# Patient Record
Sex: MALE | Race: WHITE | NOT HISPANIC OR LATINO | Employment: FULL TIME | URBAN - NONMETROPOLITAN AREA
[De-identification: names, ages, dates, MRNs, and addresses within clinical notes are randomized per-mention and may not be internally consistent; named-entity substitution may affect disease eponyms.]

---

## 2018-12-02 ENCOUNTER — HOSPITAL ENCOUNTER (EMERGENCY)
Facility: HOSPITAL | Age: 38
Discharge: HOME | End: 2018-12-02
Attending: EMERGENCY MEDICINE | Admitting: EMERGENCY MEDICINE

## 2018-12-02 VITALS
RESPIRATION RATE: 17 BRPM | BODY MASS INDEX: 27.5 KG/M2 | HEIGHT: 71 IN | DIASTOLIC BLOOD PRESSURE: 91 MMHG | HEART RATE: 85 BPM | SYSTOLIC BLOOD PRESSURE: 124 MMHG | WEIGHT: 196.4 LBS | TEMPERATURE: 98.1 F | OXYGEN SATURATION: 96 %

## 2018-12-02 DIAGNOSIS — R11.2 NON-INTRACTABLE VOMITING WITH NAUSEA, UNSPECIFIED VOMITING TYPE: Primary | ICD-10-CM

## 2018-12-02 DIAGNOSIS — R19.7 DIARRHEA, UNSPECIFIED TYPE: ICD-10-CM

## 2018-12-02 LAB
ALBUMIN SERPL-MCNC: 4.8 G/DL (ref 3.5–5)
ALBUMIN/GLOB SERPL: 1.7 G/DL (ref 1–2)
ALP SERPL-CCNC: 104 U/L (ref 38–126)
ALT SERPL W P-5'-P-CCNC: 66 U/L (ref 13–69)
ANION GAP SERPL CALCULATED.3IONS-SCNC: 12.9 MMOL/L (ref 10–20)
AST SERPL-CCNC: 51 U/L (ref 15–46)
BASOPHILS # BLD AUTO: 0.04 10*3/MM3 (ref 0–0.2)
BASOPHILS NFR BLD AUTO: 0.9 % (ref 0–2.5)
BILIRUB SERPL-MCNC: 0.6 MG/DL (ref 0.2–1.3)
BILIRUB UR QL STRIP: NEGATIVE
BUN BLD-MCNC: 15 MG/DL (ref 7–20)
BUN/CREAT SERPL: 12.5 (ref 6.3–21.9)
CALCIUM SPEC-SCNC: 9.2 MG/DL (ref 8.4–10.2)
CHLORIDE SERPL-SCNC: 103 MMOL/L (ref 98–107)
CLARITY UR: CLEAR
CO2 SERPL-SCNC: 26 MMOL/L (ref 26–30)
COLOR UR: YELLOW
CREAT BLD-MCNC: 1.2 MG/DL (ref 0.6–1.3)
DEPRECATED RDW RBC AUTO: 38.7 FL (ref 37–54)
EOSINOPHIL # BLD AUTO: 0.06 10*3/MM3 (ref 0–0.7)
EOSINOPHIL NFR BLD AUTO: 1.3 % (ref 0–7)
ERYTHROCYTE [DISTWIDTH] IN BLOOD BY AUTOMATED COUNT: 12 % (ref 11.5–14.5)
FLUAV AG NPH QL: NEGATIVE
FLUBV AG NPH QL IA: NEGATIVE
GFR SERPL CREATININE-BSD FRML MDRD: 68 ML/MIN/1.73
GLOBULIN UR ELPH-MCNC: 2.8 GM/DL
GLUCOSE BLD-MCNC: 126 MG/DL (ref 74–98)
GLUCOSE UR STRIP-MCNC: NEGATIVE MG/DL
HCT VFR BLD AUTO: 43.6 % (ref 42–52)
HGB BLD-MCNC: 15.2 G/DL (ref 14–18)
HGB UR QL STRIP.AUTO: NEGATIVE
IMM GRANULOCYTES # BLD: 0.04 10*3/MM3 (ref 0–0.06)
IMM GRANULOCYTES NFR BLD: 0.9 % (ref 0–0.6)
KETONES UR QL STRIP: NEGATIVE
LEUKOCYTE ESTERASE UR QL STRIP.AUTO: NEGATIVE
LIPASE SERPL-CCNC: 113 U/L (ref 23–300)
LYMPHOCYTES # BLD AUTO: 1.17 10*3/MM3 (ref 0.6–3.4)
LYMPHOCYTES NFR BLD AUTO: 26.2 % (ref 10–50)
MCH RBC QN AUTO: 30.6 PG (ref 27–31)
MCHC RBC AUTO-ENTMCNC: 34.9 G/DL (ref 30–37)
MCV RBC AUTO: 87.9 FL (ref 80–94)
MONOCYTES # BLD AUTO: 0.41 10*3/MM3 (ref 0–0.9)
MONOCYTES NFR BLD AUTO: 9.2 % (ref 0–12)
NEUTROPHILS # BLD AUTO: 2.75 10*3/MM3 (ref 2–6.9)
NEUTROPHILS NFR BLD AUTO: 61.5 % (ref 37–80)
NITRITE UR QL STRIP: NEGATIVE
NRBC BLD MANUAL-RTO: 0 /100 WBC (ref 0–0)
PH UR STRIP.AUTO: 7 [PH] (ref 5–8)
PLATELET # BLD AUTO: 221 10*3/MM3 (ref 130–400)
PMV BLD AUTO: 8.8 FL (ref 6–12)
POTASSIUM BLD-SCNC: 3.9 MMOL/L (ref 3.5–5.1)
PROT SERPL-MCNC: 7.6 G/DL (ref 6.3–8.2)
PROT UR QL STRIP: NEGATIVE
RBC # BLD AUTO: 4.96 10*6/MM3 (ref 4.7–6.1)
SODIUM BLD-SCNC: 138 MMOL/L (ref 137–145)
SP GR UR STRIP: 1.01 (ref 1–1.03)
UROBILINOGEN UR QL STRIP: NORMAL
WBC NRBC COR # BLD: 4.47 10*3/MM3 (ref 4.8–10.8)

## 2018-12-02 PROCEDURE — 85025 COMPLETE CBC W/AUTO DIFF WBC: CPT | Performed by: NURSE PRACTITIONER

## 2018-12-02 PROCEDURE — 96374 THER/PROPH/DIAG INJ IV PUSH: CPT

## 2018-12-02 PROCEDURE — 83690 ASSAY OF LIPASE: CPT | Performed by: NURSE PRACTITIONER

## 2018-12-02 PROCEDURE — 81003 URINALYSIS AUTO W/O SCOPE: CPT | Performed by: NURSE PRACTITIONER

## 2018-12-02 PROCEDURE — 96375 TX/PRO/DX INJ NEW DRUG ADDON: CPT

## 2018-12-02 PROCEDURE — 25010000002 PROCHLORPERAZINE EDISYLATE PER 10 MG: Performed by: NURSE PRACTITIONER

## 2018-12-02 PROCEDURE — 25010000002 ONDANSETRON PER 1 MG: Performed by: NURSE PRACTITIONER

## 2018-12-02 PROCEDURE — 80053 COMPREHEN METABOLIC PANEL: CPT | Performed by: NURSE PRACTITIONER

## 2018-12-02 PROCEDURE — 99284 EMERGENCY DEPT VISIT MOD MDM: CPT

## 2018-12-02 PROCEDURE — 87804 INFLUENZA ASSAY W/OPTIC: CPT | Performed by: NURSE PRACTITIONER

## 2018-12-02 RX ORDER — SODIUM CHLORIDE 0.9 % (FLUSH) 0.9 %
10 SYRINGE (ML) INJECTION AS NEEDED
Status: DISCONTINUED | OUTPATIENT
Start: 2018-12-02 | End: 2018-12-02 | Stop reason: HOSPADM

## 2018-12-02 RX ORDER — ONDANSETRON 2 MG/ML
4 INJECTION INTRAMUSCULAR; INTRAVENOUS ONCE
Status: COMPLETED | OUTPATIENT
Start: 2018-12-02 | End: 2018-12-02

## 2018-12-02 RX ORDER — PROMETHAZINE HYDROCHLORIDE 25 MG/1
25 TABLET ORAL EVERY 6 HOURS PRN
Qty: 20 TABLET | Refills: 0 | Status: SHIPPED | OUTPATIENT
Start: 2018-12-02 | End: 2019-04-25

## 2018-12-02 RX ADMIN — SODIUM CHLORIDE 1000 ML: 9 INJECTION, SOLUTION INTRAVENOUS at 10:12

## 2018-12-02 RX ADMIN — ONDANSETRON 4 MG: 2 INJECTION INTRAMUSCULAR; INTRAVENOUS at 10:09

## 2018-12-02 RX ADMIN — PROCHLORPERAZINE EDISYLATE 5 MG: 5 INJECTION INTRAMUSCULAR; INTRAVENOUS at 11:36

## 2018-12-02 NOTE — ED PROVIDER NOTES
Subjective   History of Present Illness  This is a 38 year old male who comes in today complaining of nausea, vomiting and diarrhea that started around midnight last night. He reports having 5 episodes of vomiting and diarrhea. He reports he has been around a co-worker who had similar symptoms.   Review of Systems   Constitutional: Negative.    HENT: Negative.    Eyes: Negative.    Respiratory: Negative.    Cardiovascular: Negative.    Gastrointestinal: Positive for diarrhea, nausea and vomiting.   Endocrine: Negative.    Genitourinary: Negative.    Musculoskeletal: Negative.    Skin: Negative.    Allergic/Immunologic: Negative.    Neurological: Negative.    Hematological: Negative.    Psychiatric/Behavioral: Negative.    All other systems reviewed and are negative.      History reviewed. No pertinent past medical history.    No Known Allergies    History reviewed. No pertinent surgical history.    History reviewed. No pertinent family history.    Social History     Socioeconomic History   • Marital status:      Spouse name: Not on file   • Number of children: Not on file   • Years of education: Not on file   • Highest education level: Not on file   Tobacco Use   • Smoking status: Never Smoker   Substance and Sexual Activity   • Alcohol use: No     Frequency: Never   • Drug use: No           Objective   Physical Exam   Constitutional: He appears well-developed and well-nourished.   Nursing note and vitals reviewed.  GEN: No acute distress  Head: Normocephalic, atraumatic  Eyes: Pupils equal round reactive to light  ENT: Posterior pharynx normal in appearance, oral mucosa is moist  Chest: Nontender to palpation  Cardiovascular: Regular rate  Lungs: Clear to auscultation bilaterally  Abdomen: Soft, nontender, nondistended, no peritoneal signs  Extremities: No edema, normal appearance  Neuro: GCS 15  Psych: Mood and affect are appropriate      Procedures           ED Course  ED Course as of Dec 02 1227   Docena Dec  02, 2018   1225 Feeling some better. No vomiting after meds. I have advised him strict return to care instructions and he is agreeable to this plan of care.   [TW]      ED Course User Index  [TW] Carmen Last, LETICIA                  MDM  Number of Diagnoses or Management Options     Amount and/or Complexity of Data Reviewed  Clinical lab tests: ordered and reviewed  Review and summarize past medical records: yes  Discuss the patient with other providers: yes    Risk of Complications, Morbidity, and/or Mortality  Presenting problems: moderate  Diagnostic procedures: low  Management options: moderate          Final diagnoses:   Non-intractable vomiting with nausea, unspecified vomiting type   Diarrhea, unspecified type            Carmen Last APRN  12/02/18 1227       Carmen Last APRN  12/02/18 1227

## 2018-12-05 ENCOUNTER — OFFICE VISIT (OUTPATIENT)
Dept: FAMILY MEDICINE CLINIC | Facility: CLINIC | Age: 38
End: 2018-12-05

## 2018-12-05 VITALS
SYSTOLIC BLOOD PRESSURE: 130 MMHG | BODY MASS INDEX: 27.02 KG/M2 | OXYGEN SATURATION: 99 % | HEIGHT: 71 IN | DIASTOLIC BLOOD PRESSURE: 86 MMHG | HEART RATE: 90 BPM | WEIGHT: 193 LBS | TEMPERATURE: 98.4 F | RESPIRATION RATE: 16 BRPM

## 2018-12-05 DIAGNOSIS — M10.9 GOUT, UNSPECIFIED CAUSE, UNSPECIFIED CHRONICITY, UNSPECIFIED SITE: ICD-10-CM

## 2018-12-05 DIAGNOSIS — F98.8 ATTENTION DEFICIT DISORDER (ADD) WITHOUT HYPERACTIVITY: ICD-10-CM

## 2018-12-05 DIAGNOSIS — I10 ESSENTIAL HYPERTENSION: Primary | ICD-10-CM

## 2018-12-05 PROBLEM — R53.82 CHRONIC FATIGUE: Status: ACTIVE | Noted: 2018-12-05

## 2018-12-05 PROCEDURE — 99203 OFFICE O/P NEW LOW 30 MIN: CPT | Performed by: INTERNAL MEDICINE

## 2018-12-05 RX ORDER — DEXTROAMPHETAMINE SACCHARATE, AMPHETAMINE ASPARTATE MONOHYDRATE, DEXTROAMPHETAMINE SULFATE AND AMPHETAMINE SULFATE 2.5; 2.5; 2.5; 2.5 MG/1; MG/1; MG/1; MG/1
10 CAPSULE, EXTENDED RELEASE ORAL EVERY MORNING
Qty: 30 CAPSULE | Refills: 0 | Status: SHIPPED | OUTPATIENT
Start: 2018-12-05 | End: 2019-01-04 | Stop reason: SDUPTHER

## 2018-12-05 RX ORDER — CELECOXIB 200 MG/1
CAPSULE ORAL
COMMUNITY
Start: 2018-09-05 | End: 2018-12-05

## 2018-12-05 RX ORDER — CELECOXIB 200 MG/1
200 CAPSULE ORAL
COMMUNITY
Start: 2018-09-06 | End: 2019-07-23

## 2018-12-05 RX ORDER — ACETAMINOPHEN 500 MG
1000 TABLET ORAL
COMMUNITY
End: 2019-07-23

## 2018-12-05 RX ORDER — ATOMOXETINE 25 MG/1
25 CAPSULE ORAL DAILY
COMMUNITY
Start: 2018-07-25 | End: 2018-12-05

## 2018-12-05 RX ORDER — COLCHICINE 0.6 MG/1
CAPSULE ORAL
COMMUNITY
Start: 2018-09-06 | End: 2019-07-23

## 2018-12-05 NOTE — PROGRESS NOTES
Subjective   Dave Chavez is a 38 y.o. male. Patient is here today for establishing care with me as a new patient.  He has been running a bit of a high blood pressure recently.  He also has a history of gout that has just been treated with colchicine.  He also is having problems with some general fatigue and lack of focus and was tried on Strattera but felt terrible with it..  He works as a  for the .  He's had no chest pain, shortness of breath, edema or myalgias.  He has had a tonsillectomy and vasectomy in the past and a history of renal stones.  He has seen Dr. Mark Geiger.  Chief Complaint   Patient presents with   • Establish Care   • Gout   • ADD          Vitals:    12/05/18 1316   BP: 130/86   Pulse: 90   Resp: 16   Temp: 98.4 °F (36.9 °C)   SpO2: 99%     The following portions of the patient's history were reviewed and updated as appropriate: allergies, current medications, past family history, past medical history, past social history, past surgical history and problem list.    No past medical history on file.   Allergies   Allergen Reactions   • Penicillins Other (See Comments)      Social History     Socioeconomic History   • Marital status:      Spouse name: Not on file   • Number of children: Not on file   • Years of education: Not on file   • Highest education level: Not on file   Social Needs   • Financial resource strain: Not on file   • Food insecurity - worry: Not on file   • Food insecurity - inability: Not on file   • Transportation needs - medical: Not on file   • Transportation needs - non-medical: Not on file   Occupational History   • Not on file   Tobacco Use   • Smoking status: Former Smoker     Types: Cigarettes   • Tobacco comment:  1/2 a pack from age 21 to 35   Substance and Sexual Activity   • Alcohol use: Yes     Frequency: 4 or more times a week   • Drug use: No   • Sexual activity: Not on file   Other Topics Concern   • Not on file   Social History Narrative    • Not on file        Current Outpatient Medications:   •  celecoxib (CeleBREX) 200 MG capsule, Take 200 mg by mouth., Disp: , Rfl:   •  colchicine 0.6 MG capsule capsule, , Disp: , Rfl:   •  acetaminophen (TYLENOL) 500 MG tablet, Take 1,000 mg by mouth., Disp: , Rfl:   •  amphetamine-dextroamphetamine XR (ADDERALL XR) 10 MG 24 hr capsule, Take 1 capsule by mouth Every Morning, Disp: 30 capsule, Rfl: 0  •  promethazine (PHENERGAN) 25 MG tablet, Take 1 tablet by mouth Every 6 (Six) Hours As Needed for Nausea or Vomiting., Disp: 20 tablet, Rfl: 0     Objective     History of Present Illness     Review of Systems   Constitutional: Positive for fatigue.   HENT: Negative.    Eyes: Negative.    Respiratory: Negative.    Cardiovascular: Negative.    Gastrointestinal: Negative.    Genitourinary: Negative.    Musculoskeletal: Negative.    Skin: Negative.    Neurological: Negative.    Psychiatric/Behavioral: Positive for decreased concentration and sleep disturbance.       Physical Exam   Constitutional: He is oriented to person, place, and time. He appears well-developed and well-nourished.   Pleasant, cooperative no distress with a blood pressure 140/90   HENT:   Head: Normocephalic and atraumatic.   Eyes: Conjunctivae are normal. Pupils are equal, round, and reactive to light. No scleral icterus.   Neck: Normal range of motion. Neck supple.   Cardiovascular: Normal rate, regular rhythm and normal heart sounds.   Pulmonary/Chest: Effort normal and breath sounds normal. No respiratory distress. He has no wheezes. He has no rales.   Musculoskeletal: Normal range of motion. He exhibits no edema.   Neurological: He is alert and oriented to person, place, and time.   Skin: Skin is warm and dry.   Psychiatric: He has a normal mood and affect. His behavior is normal.   Nursing note and vitals reviewed.      ASSESSMENT  this is a new patient with symptoms very suggestive of ADD.  He also is having some fatigue and has a history of  gout.  His blood pressure is high today.     Problem List Items Addressed This Visit        Cardiovascular and Mediastinum    BP (high blood pressure) - Primary       Other    Attention deficit disorder (ADD) without hyperactivity    Relevant Medications    amphetamine-dextroamphetamine XR (ADDERALL XR) 10 MG 24 hr capsule          PLAN the patient will monitor his blood pressure at home.  I'm going to start the patient on Adderall extended release 20 mg daily to see if that helps with his presumptive ADD and focus.  I'd like him to schedule for a complete physical exam as since we can with a CBC, CMP, lipid panel, hemoglobin A1c, uric acid level, TSH and urinalysis and an EKG but no chest x-ray necessary.    There are no Patient Instructions on file for this visit.  Return for Annual physical, with labs.

## 2019-01-02 DIAGNOSIS — I10 HYPERTENSION, UNSPECIFIED TYPE: ICD-10-CM

## 2019-01-02 DIAGNOSIS — Z00.00 ROUTINE HEALTH MAINTENANCE: Primary | ICD-10-CM

## 2019-01-02 DIAGNOSIS — R73.9 ELEVATED BLOOD SUGAR: ICD-10-CM

## 2019-01-02 DIAGNOSIS — M10.9 GOUT, UNSPECIFIED CAUSE, UNSPECIFIED CHRONICITY, UNSPECIFIED SITE: ICD-10-CM

## 2019-01-04 RX ORDER — DEXTROAMPHETAMINE SACCHARATE, AMPHETAMINE ASPARTATE MONOHYDRATE, DEXTROAMPHETAMINE SULFATE AND AMPHETAMINE SULFATE 2.5; 2.5; 2.5; 2.5 MG/1; MG/1; MG/1; MG/1
10 CAPSULE, EXTENDED RELEASE ORAL EVERY MORNING
Qty: 30 CAPSULE | Refills: 0 | Status: CANCELLED | OUTPATIENT
Start: 2019-01-04

## 2019-01-04 RX ORDER — DEXTROAMPHETAMINE SACCHARATE, AMPHETAMINE ASPARTATE MONOHYDRATE, DEXTROAMPHETAMINE SULFATE AND AMPHETAMINE SULFATE 2.5; 2.5; 2.5; 2.5 MG/1; MG/1; MG/1; MG/1
10 CAPSULE, EXTENDED RELEASE ORAL EVERY MORNING
Qty: 30 CAPSULE | Refills: 0 | Status: SHIPPED | OUTPATIENT
Start: 2019-01-04 | End: 2019-01-15 | Stop reason: SDUPTHER

## 2019-01-09 ENCOUNTER — CLINICAL SUPPORT (OUTPATIENT)
Dept: FAMILY MEDICINE CLINIC | Facility: CLINIC | Age: 39
End: 2019-01-09

## 2019-01-09 DIAGNOSIS — Z00.00 ROUTINE HEALTH MAINTENANCE: Primary | ICD-10-CM

## 2019-01-09 PROCEDURE — 93000 ELECTROCARDIOGRAM COMPLETE: CPT | Performed by: INTERNAL MEDICINE

## 2019-01-09 PROCEDURE — 85025 COMPLETE CBC W/AUTO DIFF WBC: CPT | Performed by: INTERNAL MEDICINE

## 2019-01-10 LAB
ALBUMIN SERPL-MCNC: 4.9 G/DL (ref 3.5–5.2)
ALBUMIN/GLOB SERPL: 1.6 G/DL
ALP SERPL-CCNC: 102 U/L (ref 39–117)
ALT SERPL-CCNC: 73 U/L (ref 1–41)
APPEARANCE UR: CLEAR
AST SERPL-CCNC: 55 U/L (ref 1–40)
BILIRUB SERPL-MCNC: 0.4 MG/DL (ref 0.1–1.2)
BILIRUB UR QL STRIP: NEGATIVE
BUN SERPL-MCNC: 11 MG/DL (ref 6–20)
BUN/CREAT SERPL: 9.7 (ref 7–25)
CALCIUM SERPL-MCNC: 10 MG/DL (ref 8.6–10.5)
CHLORIDE SERPL-SCNC: 99 MMOL/L (ref 98–107)
CHOLEST SERPL-MCNC: 278 MG/DL (ref 0–200)
CO2 SERPL-SCNC: 26.1 MMOL/L (ref 22–29)
COLOR UR: YELLOW
CREAT SERPL-MCNC: 1.13 MG/DL (ref 0.76–1.27)
GLOBULIN SER CALC-MCNC: 3.1 GM/DL
GLUCOSE SERPL-MCNC: 97 MG/DL (ref 65–99)
GLUCOSE UR QL: NEGATIVE
HBA1C MFR BLD: 5.12 % (ref 4.8–5.6)
HDLC SERPL-MCNC: 82 MG/DL (ref 40–60)
HGB UR QL STRIP: NEGATIVE
KETONES UR QL STRIP: NEGATIVE
LDLC SERPL CALC-MCNC: 147 MG/DL (ref 0–100)
LDLC/HDLC SERPL: 1.8 {RATIO}
LEUKOCYTE ESTERASE UR QL STRIP: NEGATIVE
NITRITE UR QL STRIP: NEGATIVE
PH UR STRIP: 6.5 [PH] (ref 5–8)
POTASSIUM SERPL-SCNC: 3.9 MMOL/L (ref 3.5–5.2)
PROT SERPL-MCNC: 8 G/DL (ref 6–8.5)
PROT UR QL STRIP: NEGATIVE
SODIUM SERPL-SCNC: 140 MMOL/L (ref 136–145)
SP GR UR: 1.01 (ref 1–1.03)
TRIGL SERPL-MCNC: 243 MG/DL (ref 0–150)
TSH SERPL DL<=0.005 MIU/L-ACNC: 2.34 MIU/ML (ref 0.27–4.2)
URATE SERPL-MCNC: 7.9 MG/DL (ref 3.4–7)
UROBILINOGEN UR STRIP-MCNC: NORMAL MG/DL
VLDLC SERPL CALC-MCNC: 48.6 MG/DL (ref 5–40)

## 2019-01-15 ENCOUNTER — OFFICE VISIT (OUTPATIENT)
Dept: FAMILY MEDICINE CLINIC | Facility: CLINIC | Age: 39
End: 2019-01-15

## 2019-01-15 VITALS
DIASTOLIC BLOOD PRESSURE: 90 MMHG | SYSTOLIC BLOOD PRESSURE: 130 MMHG | OXYGEN SATURATION: 98 % | BODY MASS INDEX: 27.3 KG/M2 | WEIGHT: 195 LBS | HEART RATE: 82 BPM | TEMPERATURE: 97.1 F | RESPIRATION RATE: 15 BRPM | HEIGHT: 71 IN

## 2019-01-15 DIAGNOSIS — F98.8 ATTENTION DEFICIT DISORDER (ADD) WITHOUT HYPERACTIVITY: ICD-10-CM

## 2019-01-15 DIAGNOSIS — I10 ESSENTIAL HYPERTENSION: ICD-10-CM

## 2019-01-15 DIAGNOSIS — R79.89 ABNORMAL LFTS (LIVER FUNCTION TESTS): ICD-10-CM

## 2019-01-15 DIAGNOSIS — Z00.00 HEALTH MAINTENANCE EXAMINATION: Primary | ICD-10-CM

## 2019-01-15 DIAGNOSIS — M10.9 GOUT, UNSPECIFIED CAUSE, UNSPECIFIED CHRONICITY, UNSPECIFIED SITE: ICD-10-CM

## 2019-01-15 PROBLEM — R41.840 ATTENTION AND CONCENTRATION DEFICIT: Status: ACTIVE | Noted: 2018-06-01

## 2019-01-15 PROCEDURE — 99395 PREV VISIT EST AGE 18-39: CPT | Performed by: INTERNAL MEDICINE

## 2019-01-15 RX ORDER — ROSUVASTATIN CALCIUM 5 MG/1
5 TABLET, COATED ORAL DAILY
Qty: 90 TABLET | Refills: 3 | Status: SHIPPED | OUTPATIENT
Start: 2019-01-15 | End: 2020-02-14 | Stop reason: SDUPTHER

## 2019-01-15 RX ORDER — DEXTROAMPHETAMINE SACCHARATE, AMPHETAMINE ASPARTATE MONOHYDRATE, DEXTROAMPHETAMINE SULFATE AND AMPHETAMINE SULFATE 2.5; 2.5; 2.5; 2.5 MG/1; MG/1; MG/1; MG/1
10 CAPSULE, EXTENDED RELEASE ORAL 2 TIMES DAILY
Qty: 60 CAPSULE | Refills: 0 | Status: SHIPPED | OUTPATIENT
Start: 2019-01-15 | End: 2019-02-20 | Stop reason: SDUPTHER

## 2019-01-15 RX ORDER — LISINOPRIL 10 MG/1
10 TABLET ORAL DAILY
Qty: 90 TABLET | Refills: 3 | Status: SHIPPED | OUTPATIENT
Start: 2019-01-15 | End: 2020-02-14 | Stop reason: SDUPTHER

## 2019-01-15 RX ORDER — ALLOPURINOL 300 MG/1
300 TABLET ORAL DAILY
Qty: 90 TABLET | Refills: 3 | Status: SHIPPED | OUTPATIENT
Start: 2019-01-15 | End: 2019-04-25 | Stop reason: SDUPTHER

## 2019-01-15 NOTE — PROGRESS NOTES
Subjective   Dave Chavez is a 38 y.o. male. Patient is here today for a complete physical exam.  He generally is feeling okay.  He does have a history of elevated blood pressure, gout and some ADD and I tried him on Adderall at the last visit.  He reports that that helps but seems to fade away later in the day and probably needs a supplementary dose.  Otherwise he's had no acute symptoms.  PMH-history of kidney stones in the past and has had tonsillectomy and vasectomy in the past.  He also has a history of gout and ADD  SH-patient's , sexually active, is a nonsmoker and has 4-6 alcoholic drinks per week.  He has regular dental in vision checks.  He is a  for the   FH-patient's parents are both in their mid 50s and healthy and one brother is healthy.  There is no family diseases    Chief Complaint   Patient presents with   • Annual Exam          Vitals:    01/15/19 1051   BP: 130/90   Pulse: 82   Resp: 15   Temp: 97.1 °F (36.2 °C)   SpO2: 98%     The following portions of the patient's history were reviewed and updated as appropriate: allergies, current medications, past family history, past medical history, past social history, past surgical history and problem list.    No past medical history on file.   Allergies   Allergen Reactions   • Penicillins Other (See Comments)      Social History     Socioeconomic History   • Marital status:      Spouse name: Not on file   • Number of children: Not on file   • Years of education: Not on file   • Highest education level: Not on file   Social Needs   • Financial resource strain: Not on file   • Food insecurity - worry: Not on file   • Food insecurity - inability: Not on file   • Transportation needs - medical: Not on file   • Transportation needs - non-medical: Not on file   Occupational History   • Not on file   Tobacco Use   • Smoking status: Former Smoker     Types: Cigarettes   • Tobacco comment:  1/2 a pack from age 21 to 35   Substance  and Sexual Activity   • Alcohol use: Yes     Frequency: 4 or more times a week   • Drug use: No   • Sexual activity: Not on file   Other Topics Concern   • Not on file   Social History Narrative   • Not on file        Current Outpatient Medications:   •  acetaminophen (TYLENOL) 500 MG tablet, Take 1,000 mg by mouth., Disp: , Rfl:   •  amphetamine-dextroamphetamine XR (ADDERALL XR) 10 MG 24 hr capsule, Take 1 capsule by mouth Every Morning Earliest Fill Date: 1/4/19, Disp: 30 capsule, Rfl: 0  •  celecoxib (CeleBREX) 200 MG capsule, Take 200 mg by mouth., Disp: , Rfl:   •  colchicine 0.6 MG capsule capsule, , Disp: , Rfl:   •  promethazine (PHENERGAN) 25 MG tablet, Take 1 tablet by mouth Every 6 (Six) Hours As Needed for Nausea or Vomiting., Disp: 20 tablet, Rfl: 0     Objective     History of Present Illness     Review of Systems   Constitutional: Negative.    HENT: Negative.    Eyes: Negative.    Respiratory: Negative.    Cardiovascular: Negative.    Gastrointestinal: Negative.    Endocrine: Negative.    Genitourinary: Negative.    Musculoskeletal: Negative.    Neurological: Negative.    Psychiatric/Behavioral: Negative.        Physical Exam   Constitutional: He is oriented to person, place, and time. He appears well-developed and well-nourished.   Pleasant, cooperative no distress, blood pressure 140/90   HENT:   Head: Normocephalic and atraumatic.   Right Ear: Hearing, tympanic membrane, external ear and ear canal normal.   Left Ear: Hearing, tympanic membrane, external ear and ear canal normal.   Nose: Nose normal. Right sinus exhibits no maxillary sinus tenderness and no frontal sinus tenderness. Left sinus exhibits no maxillary sinus tenderness and no frontal sinus tenderness.   Mouth/Throat: Uvula is midline, oropharynx is clear and moist and mucous membranes are normal. No tonsillar exudate.   Eyes: Conjunctivae and EOM are normal. Pupils are equal, round, and reactive to light. No scleral icterus.   Neck:  Normal range of motion. Neck supple. No JVD present. No tracheal deviation present. No thyromegaly present.   Cardiovascular: Normal rate, regular rhythm, normal heart sounds and intact distal pulses. Exam reveals no gallop and no friction rub.   No murmur heard.  Pulmonary/Chest: Effort normal and breath sounds normal. No stridor. No respiratory distress. He has no wheezes. He has no rales. He exhibits no tenderness.   Abdominal: Soft. Bowel sounds are normal. He exhibits no distension and no mass. There is no tenderness. There is no rebound and no guarding. No hernia.   Genitourinary: Penis normal.   Musculoskeletal: Normal range of motion. He exhibits no edema, tenderness or deformity.   Lymphadenopathy:     He has no cervical adenopathy.   Neurological: He is alert and oriented to person, place, and time.   Skin: Skin is warm and dry.   Psychiatric: He has a normal mood and affect. His behavior is normal. Judgment and thought content normal.   Nursing note and vitals reviewed.      ASSESSMENT  EKG shows a mild sinus bradycardia 51 and is otherwise normal.  CBC was completely normal.  CMP has a minimally elevated AST of 55 and ALT of 73 and is otherwise normal.  Lipid panel is high with a total cholesterol 278, HDL of 82,  and triglycerides 243.  Urinalysis, hemoglobin A1c and TSH were all normal.  Uric acid level is high at 7.9  #1-generally healthy male is a little overweight with  #2-gout with high uric acid level  #3-hypertension, essential  #4-hyperlipidemia  #5-ADD     Problem List Items Addressed This Visit        Cardiovascular and Mediastinum    BP (high blood pressure)       Other    Attention deficit disorder (ADD) without hyperactivity    Gout      Other Visit Diagnoses     Health maintenance examination    -  Primary          PLAN  I'm going to start the patient on generic Crestor 5 mg daily for his cholesterol, lisinopril 10 mg daily for his hypertension, allopurinol 300 mg daily for his gout  and I'm increasing his Adderall Pixar to 10 mg twice a day for his ADD.  I plan on rechecking him in 2 months with a CMP, lipid panel, uric acid level    There are no Patient Instructions on file for this visit.  No Follow-up on file.

## 2019-02-16 RX ORDER — DEXTROAMPHETAMINE SACCHARATE, AMPHETAMINE ASPARTATE MONOHYDRATE, DEXTROAMPHETAMINE SULFATE AND AMPHETAMINE SULFATE 2.5; 2.5; 2.5; 2.5 MG/1; MG/1; MG/1; MG/1
10 CAPSULE, EXTENDED RELEASE ORAL 2 TIMES DAILY
Qty: 60 CAPSULE | Refills: 0 | Status: CANCELLED | OUTPATIENT
Start: 2019-02-16

## 2019-02-19 ENCOUNTER — TELEPHONE (OUTPATIENT)
Dept: FAMILY MEDICINE CLINIC | Facility: CLINIC | Age: 39
End: 2019-02-19

## 2019-02-20 RX ORDER — DEXTROAMPHETAMINE SACCHARATE, AMPHETAMINE ASPARTATE MONOHYDRATE, DEXTROAMPHETAMINE SULFATE AND AMPHETAMINE SULFATE 2.5; 2.5; 2.5; 2.5 MG/1; MG/1; MG/1; MG/1
10 CAPSULE, EXTENDED RELEASE ORAL 2 TIMES DAILY
Qty: 60 CAPSULE | Refills: 0 | Status: SHIPPED | OUTPATIENT
Start: 2019-02-20 | End: 2019-04-11 | Stop reason: SDUPTHER

## 2019-04-03 RX ORDER — DEXTROAMPHETAMINE SACCHARATE, AMPHETAMINE ASPARTATE MONOHYDRATE, DEXTROAMPHETAMINE SULFATE AND AMPHETAMINE SULFATE 2.5; 2.5; 2.5; 2.5 MG/1; MG/1; MG/1; MG/1
10 CAPSULE, EXTENDED RELEASE ORAL 2 TIMES DAILY
Qty: 60 CAPSULE | Refills: 0 | OUTPATIENT
Start: 2019-04-03

## 2019-04-11 RX ORDER — DEXTROAMPHETAMINE SACCHARATE, AMPHETAMINE ASPARTATE MONOHYDRATE, DEXTROAMPHETAMINE SULFATE AND AMPHETAMINE SULFATE 2.5; 2.5; 2.5; 2.5 MG/1; MG/1; MG/1; MG/1
10 CAPSULE, EXTENDED RELEASE ORAL 2 TIMES DAILY
Qty: 60 CAPSULE | Refills: 0 | Status: SHIPPED | OUTPATIENT
Start: 2019-04-11 | End: 2019-06-06 | Stop reason: SDUPTHER

## 2019-04-11 RX ORDER — ALPRAZOLAM 0.25 MG/1
TABLET ORAL
Qty: 3 TABLET | Refills: 0 | Status: SHIPPED | OUTPATIENT
Start: 2019-04-11 | End: 2019-04-25

## 2019-04-15 DIAGNOSIS — R79.89 ABNORMAL LFTS (LIVER FUNCTION TESTS): ICD-10-CM

## 2019-04-15 DIAGNOSIS — I10 ESSENTIAL HYPERTENSION: Primary | ICD-10-CM

## 2019-04-15 DIAGNOSIS — E78.5 HYPERLIPIDEMIA, UNSPECIFIED HYPERLIPIDEMIA TYPE: ICD-10-CM

## 2019-04-15 DIAGNOSIS — M10.00 IDIOPATHIC GOUT, UNSPECIFIED CHRONICITY, UNSPECIFIED SITE: ICD-10-CM

## 2019-04-22 LAB
ALBUMIN SERPL-MCNC: 5.2 G/DL (ref 3.5–5.2)
ALBUMIN/GLOB SERPL: 2.4 G/DL
ALP SERPL-CCNC: 82 U/L (ref 39–117)
ALT SERPL-CCNC: 45 U/L (ref 1–41)
AST SERPL-CCNC: 29 U/L (ref 1–40)
BILIRUB SERPL-MCNC: 0.5 MG/DL (ref 0.2–1.2)
BUN SERPL-MCNC: 11 MG/DL (ref 6–20)
BUN/CREAT SERPL: 10.2 (ref 7–25)
CALCIUM SERPL-MCNC: 9.9 MG/DL (ref 8.6–10.5)
CHLORIDE SERPL-SCNC: 101 MMOL/L (ref 98–107)
CHOLEST SERPL-MCNC: 195 MG/DL (ref 0–200)
CO2 SERPL-SCNC: 27.4 MMOL/L (ref 22–29)
CREAT SERPL-MCNC: 1.08 MG/DL (ref 0.76–1.27)
GLOBULIN SER CALC-MCNC: 2.2 GM/DL
GLUCOSE SERPL-MCNC: 103 MG/DL (ref 65–99)
HDLC SERPL-MCNC: 85 MG/DL (ref 40–60)
LDLC SERPL CALC-MCNC: 93 MG/DL (ref 0–100)
LDLC/HDLC SERPL: 1.1 {RATIO}
POTASSIUM SERPL-SCNC: 4.3 MMOL/L (ref 3.5–5.2)
PROT SERPL-MCNC: 7.4 G/DL (ref 6–8.5)
SODIUM SERPL-SCNC: 141 MMOL/L (ref 136–145)
TRIGL SERPL-MCNC: 83 MG/DL (ref 0–150)
URATE SERPL-MCNC: 8 MG/DL (ref 3.4–7)
VLDLC SERPL CALC-MCNC: 16.6 MG/DL

## 2019-04-25 ENCOUNTER — OFFICE VISIT (OUTPATIENT)
Dept: FAMILY MEDICINE CLINIC | Facility: CLINIC | Age: 39
End: 2019-04-25

## 2019-04-25 VITALS
HEART RATE: 81 BPM | HEIGHT: 71 IN | DIASTOLIC BLOOD PRESSURE: 84 MMHG | RESPIRATION RATE: 16 BRPM | WEIGHT: 186 LBS | SYSTOLIC BLOOD PRESSURE: 124 MMHG | OXYGEN SATURATION: 100 % | BODY MASS INDEX: 26.04 KG/M2

## 2019-04-25 DIAGNOSIS — M10.9 GOUT, UNSPECIFIED CAUSE, UNSPECIFIED CHRONICITY, UNSPECIFIED SITE: ICD-10-CM

## 2019-04-25 DIAGNOSIS — F41.9 ANXIETY AND DEPRESSION: ICD-10-CM

## 2019-04-25 DIAGNOSIS — F32.A ANXIETY AND DEPRESSION: ICD-10-CM

## 2019-04-25 DIAGNOSIS — G47.00 INSOMNIA, UNSPECIFIED TYPE: ICD-10-CM

## 2019-04-25 DIAGNOSIS — I10 ESSENTIAL HYPERTENSION: Primary | ICD-10-CM

## 2019-04-25 DIAGNOSIS — F98.8 ATTENTION DEFICIT DISORDER (ADD) WITHOUT HYPERACTIVITY: ICD-10-CM

## 2019-04-25 PROCEDURE — 99214 OFFICE O/P EST MOD 30 MIN: CPT | Performed by: INTERNAL MEDICINE

## 2019-04-25 RX ORDER — ALLOPURINOL 300 MG/1
450 TABLET ORAL DAILY
Qty: 135 TABLET | Refills: 3 | Status: SHIPPED | OUTPATIENT
Start: 2019-04-25 | End: 2020-01-31 | Stop reason: SDUPTHER

## 2019-04-25 NOTE — PROGRESS NOTES
Subjective   Dave Chavez is a 38 y.o. male. Patient is here today for follow-up on his hypertension, hyperlipidemia and ADD.  He is generally been stable.  He does complain of some generalized insomnia, he has both trouble going to sleep and awakening during the night and unable to get back to sleep.  He has had no help with over-the-counter sleep medicines.  He has had no gout attacks.  He has had a mild dry cough with the blood pressure pill but is not really bothering him and he does not feel the need to switch medications  Chief Complaint   Patient presents with   • Hypertension   • Hyperlipidemia   • Anxiety   • Cough          Vitals:    04/25/19 1346   BP: 124/84   Pulse: 81   Resp: 16   SpO2: 100%     The following portions of the patient's history were reviewed and updated as appropriate: allergies, current medications, past family history, past medical history, past social history, past surgical history and problem list.    No past medical history on file.   Allergies   Allergen Reactions   • Penicillins Other (See Comments)      Social History     Socioeconomic History   • Marital status:      Spouse name: Not on file   • Number of children: Not on file   • Years of education: Not on file   • Highest education level: Not on file   Tobacco Use   • Smoking status: Former Smoker     Types: Cigarettes   • Tobacco comment:  1/2 a pack from age 21 to 35   Substance and Sexual Activity   • Alcohol use: Yes     Frequency: 4 or more times a week   • Drug use: No        Current Outpatient Medications:   •  acetaminophen (TYLENOL) 500 MG tablet, Take 1,000 mg by mouth., Disp: , Rfl:   •  allopurinol (ZYLOPRIM) 300 MG tablet, Take 1.5 tablets by mouth Daily., Disp: 135 tablet, Rfl: 3  •  amphetamine-dextroamphetamine XR (ADDERALL XR) 10 MG 24 hr capsule, Take 1 capsule by mouth 2 (Two) Times a Day Earliest Fill Date: 4/11/19, Disp: 60 capsule, Rfl: 0  •  celecoxib (CeleBREX) 200 MG capsule, Take 200 mg by  mouth., Disp: , Rfl:   •  colchicine 0.6 MG capsule capsule, , Disp: , Rfl:   •  lisinopril (PRINIVIL,ZESTRIL) 10 MG tablet, Take 1 tablet by mouth Daily., Disp: 90 tablet, Rfl: 3  •  rosuvastatin (CRESTOR) 5 MG tablet, Take 1 tablet by mouth Daily., Disp: 90 tablet, Rfl: 3  •  Suvorexant (BELSOMRA) 20 MG tablet, Take 20 mg by mouth Every Night., Disp: 30 tablet, Rfl: 2     Objective     History of Present Illness     Review of Systems   Constitutional: Negative.    HENT: Negative.    Eyes: Negative.    Respiratory: Negative.    Cardiovascular: Negative.    Gastrointestinal: Negative.    Genitourinary: Negative.    Musculoskeletal: Negative.    Skin: Negative.    Neurological: Negative.    Psychiatric/Behavioral: Negative.        Physical Exam   Constitutional: He is oriented to person, place, and time. He appears well-developed and well-nourished.   Pleasant, cooperative no distress, blood pressure 130/80   HENT:   Head: Normocephalic and atraumatic.   Eyes: Conjunctivae are normal. Pupils are equal, round, and reactive to light. No scleral icterus.   Neck: Normal range of motion. Neck supple.   Cardiovascular: Normal rate, regular rhythm and normal heart sounds.   Pulmonary/Chest: Effort normal and breath sounds normal. No respiratory distress. He has no wheezes. He has no rales.   Musculoskeletal: Normal range of motion. He exhibits no edema.   Neurological: He is alert and oriented to person, place, and time.   Skin: Skin is warm and dry.   Psychiatric: He has a normal mood and affect. His behavior is normal.   Nursing note and vitals reviewed.      ASSESSMENT lipid panel is much improved with total cholesterol 195, HDL of 85 and LDL 93.  MP had a minimally elevated sugar of 103 and otherwise was essentially normal.  Uric acid level really has not improved and is stable at 8.0 but the patient had no gout attacks.  #1-hypertension, controlled on medication with only mild cough  #2-hyperlipidemia, much improved on  Crestor  #3-ADD, controlled on medication  #4-gout, asymptomatic with continued elevation of uric acid  #5-generalized insomnia         Problem List Items Addressed This Visit        Cardiovascular and Mediastinum    BP (high blood pressure) - Primary       Other    Attention deficit disorder (ADD) without hyperactivity    Relevant Medications    Suvorexant (BELSOMRA) 20 MG tablet    Gout    Anxiety and depression    Insomnia          PLAN I am increasing the patient's allopurinol to 450 mg daily.  He will continue his other medications.  We will let him try Belsomra 20 mg at bedtime for his insomnia.  I plan on rechecking him in 4 months with a CBC, CMP, lipid panel and uric acid level    There are no Patient Instructions on file for this visit.  Return in about 4 months (around 8/25/2019) for with labs.

## 2019-05-30 RX ORDER — DEXTROAMPHETAMINE SACCHARATE, AMPHETAMINE ASPARTATE MONOHYDRATE, DEXTROAMPHETAMINE SULFATE AND AMPHETAMINE SULFATE 2.5; 2.5; 2.5; 2.5 MG/1; MG/1; MG/1; MG/1
10 CAPSULE, EXTENDED RELEASE ORAL 2 TIMES DAILY
Qty: 60 CAPSULE | Refills: 0 | OUTPATIENT
Start: 2019-05-30

## 2019-06-06 ENCOUNTER — TELEPHONE (OUTPATIENT)
Dept: FAMILY MEDICINE CLINIC | Facility: CLINIC | Age: 39
End: 2019-06-06

## 2019-06-06 RX ORDER — DEXTROAMPHETAMINE SACCHARATE, AMPHETAMINE ASPARTATE MONOHYDRATE, DEXTROAMPHETAMINE SULFATE AND AMPHETAMINE SULFATE 2.5; 2.5; 2.5; 2.5 MG/1; MG/1; MG/1; MG/1
10 CAPSULE, EXTENDED RELEASE ORAL 2 TIMES DAILY
Qty: 60 CAPSULE | Refills: 0 | Status: SHIPPED | OUTPATIENT
Start: 2019-06-06 | End: 2019-08-22 | Stop reason: SDUPTHER

## 2019-06-06 NOTE — TELEPHONE ENCOUNTER
CALLED PT AND ADVISED SCRIPT IS READY FOR     ----- Message from Kandice Mauro MA sent at 6/6/2019  2:37 PM EDT -----      ----- Message -----  From: Marybeth Quintero  Sent: 6/6/2019  10:21 AM  To: Kandice Mauro MA    REFILL ON    ADDERALL 10MG 1BID    PT: [phone]    THANK YOU

## 2019-07-05 RX ORDER — ZOLPIDEM TARTRATE 10 MG/1
10 TABLET ORAL NIGHTLY PRN
Qty: 30 TABLET | Refills: 2 | Status: SHIPPED | OUTPATIENT
Start: 2019-07-05 | End: 2019-08-30

## 2019-07-23 ENCOUNTER — OFFICE VISIT (OUTPATIENT)
Dept: FAMILY MEDICINE CLINIC | Facility: CLINIC | Age: 39
End: 2019-07-23

## 2019-07-23 VITALS
BODY MASS INDEX: 25.68 KG/M2 | OXYGEN SATURATION: 99 % | SYSTOLIC BLOOD PRESSURE: 110 MMHG | TEMPERATURE: 97.6 F | DIASTOLIC BLOOD PRESSURE: 86 MMHG | HEART RATE: 122 BPM | RESPIRATION RATE: 16 BRPM | HEIGHT: 71 IN | WEIGHT: 183.4 LBS

## 2019-07-23 DIAGNOSIS — M77.11 LATERAL EPICONDYLITIS OF BOTH ELBOWS: Primary | ICD-10-CM

## 2019-07-23 DIAGNOSIS — M77.12 LATERAL EPICONDYLITIS OF BOTH ELBOWS: Primary | ICD-10-CM

## 2019-07-23 PROCEDURE — 99213 OFFICE O/P EST LOW 20 MIN: CPT | Performed by: INTERNAL MEDICINE

## 2019-07-23 RX ORDER — COLCHICINE 0.6 MG/1
TABLET ORAL
Refills: 3 | COMMUNITY
Start: 2019-07-11 | End: 2019-08-30 | Stop reason: SDUPTHER

## 2019-07-23 NOTE — PROGRESS NOTES
Subjective   Dave Chavez is a 38 y.o. male. Patient is here today for bilateral elbow pain with certain movements.  Is not painful at rest.  Patient is active .  He has discomfort been going on for a couple of weeks but he is not taking any medicine for it.  Chief Complaint   Patient presents with   • Elbow Pain     BILATERAL ELBOW PAIN    • Hemorrhoids          Vitals:    07/23/19 1403   BP: 110/86   Pulse: (!) 122   Resp: 16   Temp: 97.6 °F (36.4 °C)   SpO2: 99%     The following portions of the patient's history were reviewed and updated as appropriate: allergies, current medications, past family history, past medical history, past social history, past surgical history and problem list.    History reviewed. No pertinent past medical history.   Allergies   Allergen Reactions   • Penicillins Other (See Comments)      Social History     Socioeconomic History   • Marital status:      Spouse name: Not on file   • Number of children: Not on file   • Years of education: Not on file   • Highest education level: Not on file   Tobacco Use   • Smoking status: Former Smoker     Types: Cigarettes   • Tobacco comment:  1/2 a pack from age 21 to 35   Substance and Sexual Activity   • Alcohol use: Yes     Frequency: 4 or more times a week   • Drug use: No        Current Outpatient Medications:   •  allopurinol (ZYLOPRIM) 300 MG tablet, Take 1.5 tablets by mouth Daily., Disp: 135 tablet, Rfl: 3  •  amphetamine-dextroamphetamine XR (ADDERALL XR) 10 MG 24 hr capsule, Take 1 capsule by mouth 2 (Two) Times a Day Earliest Fill Date: 6/6/19, Disp: 60 capsule, Rfl: 0  •  colchicine 0.6 MG tablet, TAKE 2 TABLETS BY MOUTH AS NEEDED FOR GOUT THEN 1 TABLET ONE HOUR LATER; SAVE REMAINDER. ENOUGH TO TREAT 5 FLARES, Disp: , Rfl: 3  •  lisinopril (PRINIVIL,ZESTRIL) 10 MG tablet, Take 1 tablet by mouth Daily., Disp: 90 tablet, Rfl: 3  •  rosuvastatin (CRESTOR) 5 MG tablet, Take 1 tablet by mouth Daily., Disp: 90 tablet, Rfl: 3  •   zolpidem (AMBIEN) 10 MG tablet, Take 1 tablet by mouth At Night As Needed for Sleep., Disp: 30 tablet, Rfl: 2     Objective     History of Present Illness     Review of Systems   Constitutional: Negative.    HENT: Negative.    Eyes: Negative.    Respiratory: Negative.    Cardiovascular: Negative.    Gastrointestinal: Negative.    Genitourinary: Negative.    Musculoskeletal: Positive for arthralgias.   Skin: Negative.    Neurological: Negative.    Psychiatric/Behavioral: Negative.        Physical Exam   Constitutional: He appears well-developed and well-nourished.   Pleasant, cooperative no distress   Eyes: Conjunctivae are normal. Pupils are equal, round, and reactive to light. No scleral icterus.   Cardiovascular: Normal rate, regular rhythm and normal heart sounds.   Pulmonary/Chest: Effort normal and breath sounds normal.   Musculoskeletal: Normal range of motion.   Patient has some mild tenderness in the lateral elbows bilaterally but good range of motion.  He has increased discomfort with stressing the joint.   Skin: Skin is warm and dry. No erythema.   Psychiatric: He has a normal mood and affect. His behavior is normal.   Nursing note and vitals reviewed.      ASSESSMENT patient has bilateral tennis elbow syndrome going on that mild to moderate.  He is actually not taking any pain medicine thus far.     Problem List Items Addressed This Visit     None      Visit Diagnoses     Lateral epicondylitis of both elbows    -  Primary          PLAN the patient can use analgesics as needed over-the-counter and can also try some forearm bands if he wishes.  He should not stress the joint as much as possible.  He does not wish to see an orthopedist currently.  I think this should resolve with time    There are no Patient Instructions on file for this visit.  No Follow-up on file.

## 2019-08-20 DIAGNOSIS — E78.5 HYPERLIPIDEMIA, UNSPECIFIED HYPERLIPIDEMIA TYPE: ICD-10-CM

## 2019-08-20 DIAGNOSIS — R79.89 ABNORMAL LFTS (LIVER FUNCTION TESTS): ICD-10-CM

## 2019-08-20 DIAGNOSIS — M10.9 GOUT, UNSPECIFIED CAUSE, UNSPECIFIED CHRONICITY, UNSPECIFIED SITE: ICD-10-CM

## 2019-08-23 ENCOUNTER — TELEPHONE (OUTPATIENT)
Dept: FAMILY MEDICINE CLINIC | Facility: CLINIC | Age: 39
End: 2019-08-23

## 2019-08-23 RX ORDER — DEXTROAMPHETAMINE SACCHARATE, AMPHETAMINE ASPARTATE MONOHYDRATE, DEXTROAMPHETAMINE SULFATE AND AMPHETAMINE SULFATE 2.5; 2.5; 2.5; 2.5 MG/1; MG/1; MG/1; MG/1
10 CAPSULE, EXTENDED RELEASE ORAL 2 TIMES DAILY
Qty: 60 CAPSULE | Refills: 0 | Status: SHIPPED | OUTPATIENT
Start: 2019-08-23 | End: 2020-01-08 | Stop reason: SDUPTHER

## 2019-08-23 RX ORDER — ALPRAZOLAM 0.25 MG/1
TABLET ORAL
Qty: 3 TABLET | Refills: 0 | Status: SHIPPED | OUTPATIENT
Start: 2019-08-23 | End: 2019-08-30

## 2019-08-23 NOTE — TELEPHONE ENCOUNTER
CALLED PT AND ADVISED THAT SCRIPT IS READY FOR     ----- Message from Kandice Mauro MA sent at 8/23/2019  2:01 PM EDT -----      ----- Message -----  From: May Douglas  Sent: 8/21/2019   1:27 PM  To: Kandice Mauro MA    NEEDS A RX FOR     XANAX  FOR HIS UPCOMING LAB WORK STATES DR ANDREWS DID THIS FOR HIM LAST TIME      ADDERALL 10MG  BID # [id number]     PLEASE CALL PT     [phone]

## 2019-08-27 LAB
ALBUMIN SERPL-MCNC: 5.2 G/DL (ref 3.5–5.2)
ALBUMIN/GLOB SERPL: 2.3 G/DL
ALP SERPL-CCNC: 92 U/L (ref 39–117)
ALT SERPL-CCNC: 52 U/L (ref 1–41)
AST SERPL-CCNC: 37 U/L (ref 1–40)
BASOPHILS # BLD AUTO: 0.04 10*3/MM3 (ref 0–0.2)
BASOPHILS NFR BLD AUTO: 0.7 % (ref 0–1.5)
BILIRUB SERPL-MCNC: 1.2 MG/DL (ref 0.2–1.2)
BUN SERPL-MCNC: 11 MG/DL (ref 6–20)
BUN/CREAT SERPL: 9.2 (ref 7–25)
CALCIUM SERPL-MCNC: 9.9 MG/DL (ref 8.6–10.5)
CHLORIDE SERPL-SCNC: 98 MMOL/L (ref 98–107)
CHOLEST SERPL-MCNC: 197 MG/DL (ref 0–200)
CO2 SERPL-SCNC: 27.9 MMOL/L (ref 22–29)
CREAT SERPL-MCNC: 1.19 MG/DL (ref 0.76–1.27)
EOSINOPHIL # BLD AUTO: 0.08 10*3/MM3 (ref 0–0.4)
EOSINOPHIL NFR BLD AUTO: 1.5 % (ref 0.3–6.2)
ERYTHROCYTE [DISTWIDTH] IN BLOOD BY AUTOMATED COUNT: 12.4 % (ref 12.3–15.4)
GLOBULIN SER CALC-MCNC: 2.3 GM/DL
GLUCOSE SERPL-MCNC: 109 MG/DL (ref 65–99)
HCT VFR BLD AUTO: 50.2 % (ref 37.5–51)
HDLC SERPL-MCNC: 73 MG/DL (ref 40–60)
HGB BLD-MCNC: 16.4 G/DL (ref 13–17.7)
IMM GRANULOCYTES # BLD AUTO: 0.03 10*3/MM3 (ref 0–0.05)
IMM GRANULOCYTES NFR BLD AUTO: 0.5 % (ref 0–0.5)
LDLC SERPL CALC-MCNC: 88 MG/DL (ref 0–100)
LDLC/HDLC SERPL: 1.2 {RATIO}
LYMPHOCYTES # BLD AUTO: 1.22 10*3/MM3 (ref 0.7–3.1)
LYMPHOCYTES NFR BLD AUTO: 22.3 % (ref 19.6–45.3)
MCH RBC QN AUTO: 30.1 PG (ref 26.6–33)
MCHC RBC AUTO-ENTMCNC: 32.7 G/DL (ref 31.5–35.7)
MCV RBC AUTO: 92.1 FL (ref 79–97)
MONOCYTES # BLD AUTO: 0.64 10*3/MM3 (ref 0.1–0.9)
MONOCYTES NFR BLD AUTO: 11.7 % (ref 5–12)
NEUTROPHILS # BLD AUTO: 3.47 10*3/MM3 (ref 1.7–7)
NEUTROPHILS NFR BLD AUTO: 63.3 % (ref 42.7–76)
NRBC BLD AUTO-RTO: 0 /100 WBC (ref 0–0.2)
PLATELET # BLD AUTO: 210 10*3/MM3 (ref 140–450)
POTASSIUM SERPL-SCNC: 4.7 MMOL/L (ref 3.5–5.2)
PROT SERPL-MCNC: 7.5 G/DL (ref 6–8.5)
RBC # BLD AUTO: 5.45 10*6/MM3 (ref 4.14–5.8)
SODIUM SERPL-SCNC: 140 MMOL/L (ref 136–145)
TRIGL SERPL-MCNC: 182 MG/DL (ref 0–150)
URATE SERPL-MCNC: 3.4 MG/DL (ref 3.4–7)
VLDLC SERPL CALC-MCNC: 36.4 MG/DL
WBC # BLD AUTO: 5.48 10*3/MM3 (ref 3.4–10.8)

## 2019-08-30 ENCOUNTER — OFFICE VISIT (OUTPATIENT)
Dept: FAMILY MEDICINE CLINIC | Facility: CLINIC | Age: 39
End: 2019-08-30

## 2019-08-30 VITALS
HEART RATE: 87 BPM | TEMPERATURE: 98 F | HEIGHT: 71 IN | BODY MASS INDEX: 25.84 KG/M2 | RESPIRATION RATE: 16 BRPM | SYSTOLIC BLOOD PRESSURE: 120 MMHG | DIASTOLIC BLOOD PRESSURE: 80 MMHG | OXYGEN SATURATION: 99 % | WEIGHT: 184.6 LBS

## 2019-08-30 DIAGNOSIS — I10 ESSENTIAL HYPERTENSION: Primary | ICD-10-CM

## 2019-08-30 DIAGNOSIS — M10.9 GOUT, UNSPECIFIED CAUSE, UNSPECIFIED CHRONICITY, UNSPECIFIED SITE: ICD-10-CM

## 2019-08-30 DIAGNOSIS — E78.49 OTHER HYPERLIPIDEMIA: ICD-10-CM

## 2019-08-30 DIAGNOSIS — F98.8 ATTENTION DEFICIT DISORDER (ADD) WITHOUT HYPERACTIVITY: ICD-10-CM

## 2019-08-30 PROCEDURE — 99214 OFFICE O/P EST MOD 30 MIN: CPT | Performed by: INTERNAL MEDICINE

## 2019-08-30 RX ORDER — COLCHICINE 0.6 MG/1
TABLET ORAL
Qty: 30 TABLET | Refills: 5 | Status: SHIPPED | OUTPATIENT
Start: 2019-08-30 | End: 2020-12-31 | Stop reason: SDUPTHER

## 2019-08-30 NOTE — PROGRESS NOTES
Subjective   Dave Chavez is a 38 y.o. male. Patient is here today for follow-up on his hypertension, gout, ADD and hyperlipidemia.  He is tolerating medications.  He is having a flare of his gout has left big toe that is improving with over-the-counter naproxen and a little bit of some colchicine.  He continues on allopurinol.  Chief Complaint   Patient presents with   • Hypertension     FOLLOW UP LABS          Vitals:    08/30/19 1511   BP: 120/80   Pulse: 87   Resp: 16   Temp: 98 °F (36.7 °C)   SpO2: 99%     The following portions of the patient's history were reviewed and updated as appropriate: allergies, current medications, past family history, past medical history, past social history, past surgical history and problem list.    History reviewed. No pertinent past medical history.   Allergies   Allergen Reactions   • Penicillins Other (See Comments)      Social History     Socioeconomic History   • Marital status:      Spouse name: Not on file   • Number of children: Not on file   • Years of education: Not on file   • Highest education level: Not on file   Tobacco Use   • Smoking status: Former Smoker     Types: Cigarettes   • Tobacco comment:  1/2 a pack from age 21 to 35   Substance and Sexual Activity   • Alcohol use: Yes     Frequency: 4 or more times a week   • Drug use: No        Current Outpatient Medications:   •  allopurinol (ZYLOPRIM) 300 MG tablet, Take 1.5 tablets by mouth Daily., Disp: 135 tablet, Rfl: 3  •  amphetamine-dextroamphetamine XR (ADDERALL XR) 10 MG 24 hr capsule, Take 1 capsule by mouth 2 (Two) Times a Day Earliest Fill Date: 8/23/19, Disp: 60 capsule, Rfl: 0  •  colchicine 0.6 MG tablet, TAKE 2 PO WITH GOUT FLARE AND CAN REPEAT IN 6 HOURS IF NEEDED.  CAN TAKE 1 PO DAILY AS NEEDED, Disp: 30 tablet, Rfl: 5  •  lisinopril (PRINIVIL,ZESTRIL) 10 MG tablet, Take 1 tablet by mouth Daily., Disp: 90 tablet, Rfl: 3  •  rosuvastatin (CRESTOR) 5 MG tablet, Take 1 tablet by mouth Daily.,  Disp: 90 tablet, Rfl: 3     Objective     History of Present Illness     Review of Systems   Constitutional: Negative.    HENT: Negative.    Eyes: Negative.    Respiratory: Negative.    Cardiovascular: Negative.    Gastrointestinal: Negative.    Genitourinary: Negative.    Musculoskeletal: Positive for arthralgias and joint swelling.        Left big toe is swollen   Skin: Negative.    Neurological: Negative.    Psychiatric/Behavioral: Negative.        Physical Exam   Constitutional: He is oriented to person, place, and time. He appears well-developed and well-nourished.   Pleasant, cooperative no acute distress   HENT:   Head: Normocephalic and atraumatic.   Eyes: Conjunctivae are normal. Pupils are equal, round, and reactive to light. No scleral icterus.   Neck: Normal range of motion. Neck supple.   Cardiovascular: Normal rate, regular rhythm and normal heart sounds.   Pulmonary/Chest: Effort normal and breath sounds normal. No respiratory distress. He has no wheezes. He has no rales.   Musculoskeletal: Normal range of motion. He exhibits tenderness.   Left big toe is somewhat swollen and reddened   Neurological: He is alert and oriented to person, place, and time.   Skin: Skin is warm and dry.   Psychiatric: He has a normal mood and affect. His behavior is normal.   Nursing note and vitals reviewed.      ASSESSMENT CBC was normal.  CMP had an elevated sugar of 109 and minimally increased ALT of 52 and was otherwise normal.  Lipid panel is stable and improved with total cholesterol 197, HDL 73, LDL 88.  Uric acid is much improved from near 8 now down to 3.4  #1-hypertension controlled  #2-hyperlipidemia, well controlled  #3-acute gout in the left big toe with improved uric acid on allopurinol  #4-ADD     Problem List Items Addressed This Visit        Cardiovascular and Mediastinum    BP (high blood pressure) - Primary       Other    Attention deficit disorder (ADD) without hyperactivity    Gout          PLAN I got  the patient some colchicine and he can take 2 tablets now and repeat in 6 hours and then take it once a day as needed for the gout.  He is going to continue on the allopurinol and his other medications.  I would like to recheck him in 4 months with a CMP, lipid panel, uric acid level, hemoglobin A1c    There are no Patient Instructions on file for this visit.  Return in about 4 months (around 12/30/2019) for with labs, Annual physical.

## 2019-12-13 ENCOUNTER — OFFICE VISIT (OUTPATIENT)
Dept: FAMILY MEDICINE CLINIC | Facility: CLINIC | Age: 39
End: 2019-12-13

## 2019-12-13 VITALS
WEIGHT: 191.4 LBS | HEIGHT: 71 IN | BODY MASS INDEX: 26.8 KG/M2 | SYSTOLIC BLOOD PRESSURE: 120 MMHG | RESPIRATION RATE: 16 BRPM | HEART RATE: 95 BPM | DIASTOLIC BLOOD PRESSURE: 90 MMHG | TEMPERATURE: 98 F | OXYGEN SATURATION: 99 %

## 2019-12-13 DIAGNOSIS — F43.0 PANIC ATTACK AS REACTION TO STRESS: Primary | ICD-10-CM

## 2019-12-13 DIAGNOSIS — F41.0 PANIC ATTACK AS REACTION TO STRESS: Primary | ICD-10-CM

## 2019-12-13 PROCEDURE — 99214 OFFICE O/P EST MOD 30 MIN: CPT | Performed by: INTERNAL MEDICINE

## 2019-12-13 RX ORDER — ALPRAZOLAM 0.25 MG/1
0.25 TABLET ORAL 3 TIMES DAILY PRN
Qty: 30 TABLET | Refills: 0 | Status: SHIPPED | OUTPATIENT
Start: 2019-12-13 | End: 2020-03-12 | Stop reason: SDUPTHER

## 2019-12-13 RX ORDER — PAROXETINE 10 MG/1
10 TABLET, FILM COATED ORAL EVERY MORNING
Qty: 30 TABLET | Refills: 3 | Status: SHIPPED | OUTPATIENT
Start: 2019-12-13 | End: 2020-04-24 | Stop reason: SDUPTHER

## 2019-12-13 NOTE — PROGRESS NOTES
Subjective   Dave Chavez is a 39 y.o. male. Patient is here today for panic attacks.  He is in the  and his job is somewhat high stress.  He has had panic episodes before but they seem to be getting a bit more frequent.  They are also bad enough that he feels terrible with them and would like to try and treat them.  He has had no chest pains at all.  Chief Complaint   Patient presents with   • Anxiety     PT HAVING SOME PROBLEMS WITH ANXIETY          Vitals:    12/13/19 0904   BP: 120/90   Pulse: 95   Resp: 16   Temp: 98 °F (36.7 °C)   SpO2: 99%     Body mass index is 26.71 kg/m².  The following portions of the patient's history were reviewed and updated as appropriate: allergies, current medications, past family history, past medical history, past social history, past surgical history and problem list.    History reviewed. No pertinent past medical history.   Allergies   Allergen Reactions   • Penicillins Other (See Comments)      Social History     Socioeconomic History   • Marital status:      Spouse name: Not on file   • Number of children: Not on file   • Years of education: Not on file   • Highest education level: Not on file   Tobacco Use   • Smoking status: Former Smoker     Types: Cigarettes   • Smokeless tobacco: Never Used   • Tobacco comment:  1/2 a pack from age 21 to 35   Substance and Sexual Activity   • Alcohol use: Yes     Frequency: 4 or more times a week   • Drug use: No        Current Outpatient Medications:   •  allopurinol (ZYLOPRIM) 300 MG tablet, Take 1.5 tablets by mouth Daily., Disp: 135 tablet, Rfl: 3  •  amphetamine-dextroamphetamine XR (ADDERALL XR) 10 MG 24 hr capsule, Take 1 capsule by mouth 2 (Two) Times a Day Earliest Fill Date: 8/23/19, Disp: 60 capsule, Rfl: 0  •  colchicine 0.6 MG tablet, TAKE 2 PO WITH GOUT FLARE AND CAN REPEAT IN 6 HOURS IF NEEDED.  CAN TAKE 1 PO DAILY AS NEEDED, Disp: 30 tablet, Rfl: 5  •  lisinopril (PRINIVIL,ZESTRIL) 10 MG tablet, Take 1  tablet by mouth Daily., Disp: 90 tablet, Rfl: 3  •  rosuvastatin (CRESTOR) 5 MG tablet, Take 1 tablet by mouth Daily., Disp: 90 tablet, Rfl: 3  •  ALPRAZolam (XANAX) 0.25 MG tablet, Take 1 tablet by mouth 3 (Three) Times a Day As Needed for Anxiety., Disp: 30 tablet, Rfl: 0  •  PARoxetine (PAXIL) 10 MG tablet, Take 1 tablet by mouth Every Morning., Disp: 30 tablet, Rfl: 3     Objective     History of Present Illness     Review of Systems   Constitutional: Negative.    HENT: Negative.    Eyes: Negative.    Respiratory: Negative.    Cardiovascular: Negative.    Gastrointestinal: Negative.    Genitourinary: Negative.    Musculoskeletal: Negative.    Skin: Negative.    Neurological: Negative.    Psychiatric/Behavioral: The patient is nervous/anxious.        Physical Exam   Constitutional: He is oriented to person, place, and time. He appears well-developed and well-nourished.   Pleasant, cooperative no acute distress   HENT:   Head: Normocephalic and atraumatic.   Eyes: Pupils are equal, round, and reactive to light. Conjunctivae are normal. No scleral icterus.   Neck: Normal range of motion. Neck supple.   Cardiovascular: Normal rate, regular rhythm and normal heart sounds.   Pulmonary/Chest: Effort normal and breath sounds normal. No respiratory distress. He has no wheezes. He has no rales.   Musculoskeletal: Normal range of motion. He exhibits no edema.   Neurological: He is alert and oriented to person, place, and time.   Skin: Skin is warm and dry.   Psychiatric: He has a normal mood and affect. His behavior is normal.   Nursing note and vitals reviewed.      ASSESSMENT patient seems stable currently but is having what clearly sounds like panic attacks related somewhat to a stressful job.  There a bit worse and more frequent and he would like some treatment for them.     Problem List Items Addressed This Visit        Other    Panic attack as reaction to stress - Primary    Relevant Medications    ALPRAZolam (XANAX)  0.25 MG tablet          PLAN I am going to start the patient on Paxil 10 mg daily and got him a prescription for alprazolam 0.25 mg to use up to 3 times a day for an attack.  He is already scheduled for follow-up with me next month with laboratory studies and I want him to keep that appointment.    There are no Patient Instructions on file for this visit.  Return in about 1 month (around 1/13/2020) for with labs.

## 2020-01-07 NOTE — TELEPHONE ENCOUNTER
PT CALLED IN FOR SCRIPT REFILL ON     amphetamine-dextroamphetamine XR (ADDERALL XR) 10 MG 24 hr capsule Take 1 capsule by mouth 2 (Two) Times a Day #60    PLEASE SEND TO [facility] IN [place].    IF YOU HAVE ANY QUESTIONS PLEASE CONTACT PT [phone]

## 2020-01-08 RX ORDER — DEXTROAMPHETAMINE SACCHARATE, AMPHETAMINE ASPARTATE MONOHYDRATE, DEXTROAMPHETAMINE SULFATE AND AMPHETAMINE SULFATE 2.5; 2.5; 2.5; 2.5 MG/1; MG/1; MG/1; MG/1
10 CAPSULE, EXTENDED RELEASE ORAL 2 TIMES DAILY
Qty: 60 CAPSULE | Refills: 0 | Status: SHIPPED | OUTPATIENT
Start: 2020-01-08 | End: 2020-03-03 | Stop reason: SDUPTHER

## 2020-01-08 NOTE — TELEPHONE ENCOUNTER
RX HAS BEEN SENT TO THE PHARMACY FOR PATIENT. CALLED AND LEFT MESSAGE FOR PT ADVISING HIM OF THIS.

## 2020-01-28 DIAGNOSIS — R73.9 ELEVATED BLOOD SUGAR: ICD-10-CM

## 2020-01-28 DIAGNOSIS — M10.9 GOUT, UNSPECIFIED CAUSE, UNSPECIFIED CHRONICITY, UNSPECIFIED SITE: ICD-10-CM

## 2020-01-28 DIAGNOSIS — Z00.00 ROUTINE HEALTH MAINTENANCE: Primary | ICD-10-CM

## 2020-01-31 RX ORDER — ALLOPURINOL 300 MG/1
450 TABLET ORAL DAILY
Qty: 135 TABLET | Refills: 1 | Status: SHIPPED | OUTPATIENT
Start: 2020-01-31 | End: 2020-11-03 | Stop reason: SDUPTHER

## 2020-02-07 ENCOUNTER — CLINICAL SUPPORT (OUTPATIENT)
Dept: FAMILY MEDICINE CLINIC | Facility: CLINIC | Age: 40
End: 2020-02-07

## 2020-02-07 DIAGNOSIS — Z00.00 ROUTINE HEALTH MAINTENANCE: Primary | ICD-10-CM

## 2020-02-07 PROCEDURE — 93000 ELECTROCARDIOGRAM COMPLETE: CPT | Performed by: INTERNAL MEDICINE

## 2020-02-07 PROCEDURE — 85025 COMPLETE CBC W/AUTO DIFF WBC: CPT | Performed by: INTERNAL MEDICINE

## 2020-02-08 LAB
ALBUMIN SERPL-MCNC: 4.6 G/DL (ref 3.5–5.2)
ALBUMIN/GLOB SERPL: 2.7 G/DL
ALP SERPL-CCNC: 86 U/L (ref 39–117)
ALT SERPL-CCNC: 49 U/L (ref 1–41)
APPEARANCE UR: CLEAR
AST SERPL-CCNC: 34 U/L (ref 1–40)
BILIRUB SERPL-MCNC: 0.6 MG/DL (ref 0.2–1.2)
BILIRUB UR QL STRIP: NEGATIVE
BUN SERPL-MCNC: 18 MG/DL (ref 6–20)
BUN/CREAT SERPL: 16.5 (ref 7–25)
CALCIUM SERPL-MCNC: 9 MG/DL (ref 8.6–10.5)
CHLORIDE SERPL-SCNC: 100 MMOL/L (ref 98–107)
CHOLEST SERPL-MCNC: 185 MG/DL (ref 0–200)
CO2 SERPL-SCNC: 26.5 MMOL/L (ref 22–29)
COLOR UR: YELLOW
CREAT SERPL-MCNC: 1.09 MG/DL (ref 0.76–1.27)
GLOBULIN SER CALC-MCNC: 1.7 GM/DL
GLUCOSE SERPL-MCNC: 93 MG/DL (ref 65–99)
GLUCOSE UR QL: NEGATIVE
HBA1C MFR BLD: 5.7 % (ref 4.8–5.6)
HDLC SERPL-MCNC: 69 MG/DL (ref 40–60)
HGB UR QL STRIP: NEGATIVE
KETONES UR QL STRIP: ABNORMAL
LDLC SERPL CALC-MCNC: 99 MG/DL (ref 0–100)
LDLC/HDLC SERPL: 1.44 {RATIO}
LEUKOCYTE ESTERASE UR QL STRIP: NEGATIVE
NITRITE UR QL STRIP: NEGATIVE
PH UR STRIP: 5.5 [PH] (ref 5–8)
POTASSIUM SERPL-SCNC: 4.3 MMOL/L (ref 3.5–5.2)
PROT SERPL-MCNC: 6.3 G/DL (ref 6–8.5)
PROT UR QL STRIP: NEGATIVE
SODIUM SERPL-SCNC: 139 MMOL/L (ref 136–145)
SP GR UR: 1.02 (ref 1–1.03)
TRIGL SERPL-MCNC: 83 MG/DL (ref 0–150)
URATE SERPL-MCNC: 2.8 MG/DL (ref 3.4–7)
UROBILINOGEN UR STRIP-MCNC: ABNORMAL MG/DL
VLDLC SERPL CALC-MCNC: 16.6 MG/DL (ref 5–40)

## 2020-02-14 ENCOUNTER — OFFICE VISIT (OUTPATIENT)
Dept: FAMILY MEDICINE CLINIC | Facility: CLINIC | Age: 40
End: 2020-02-14

## 2020-02-14 VITALS
TEMPERATURE: 98 F | RESPIRATION RATE: 16 BRPM | SYSTOLIC BLOOD PRESSURE: 130 MMHG | BODY MASS INDEX: 26.04 KG/M2 | WEIGHT: 186 LBS | OXYGEN SATURATION: 99 % | HEIGHT: 71 IN | HEART RATE: 85 BPM | DIASTOLIC BLOOD PRESSURE: 90 MMHG

## 2020-02-14 DIAGNOSIS — F98.8 ATTENTION DEFICIT DISORDER (ADD) WITHOUT HYPERACTIVITY: ICD-10-CM

## 2020-02-14 DIAGNOSIS — F41.0 PANIC ATTACK AS REACTION TO STRESS: ICD-10-CM

## 2020-02-14 DIAGNOSIS — I10 ESSENTIAL HYPERTENSION: ICD-10-CM

## 2020-02-14 DIAGNOSIS — E78.49 OTHER HYPERLIPIDEMIA: Primary | ICD-10-CM

## 2020-02-14 DIAGNOSIS — M25.521 RIGHT ELBOW PAIN: ICD-10-CM

## 2020-02-14 DIAGNOSIS — M10.9 GOUT, UNSPECIFIED CAUSE, UNSPECIFIED CHRONICITY, UNSPECIFIED SITE: ICD-10-CM

## 2020-02-14 DIAGNOSIS — R79.89 ABNORMAL LFTS (LIVER FUNCTION TESTS): ICD-10-CM

## 2020-02-14 DIAGNOSIS — F43.0 PANIC ATTACK AS REACTION TO STRESS: ICD-10-CM

## 2020-02-14 PROCEDURE — 99395 PREV VISIT EST AGE 18-39: CPT | Performed by: INTERNAL MEDICINE

## 2020-02-14 RX ORDER — ROSUVASTATIN CALCIUM 5 MG/1
5 TABLET, COATED ORAL DAILY
Qty: 90 TABLET | Refills: 3 | Status: SHIPPED | OUTPATIENT
Start: 2020-02-14 | End: 2020-04-24 | Stop reason: SDUPTHER

## 2020-02-14 RX ORDER — LISINOPRIL 10 MG/1
10 TABLET ORAL DAILY
Qty: 90 TABLET | Refills: 3 | Status: SHIPPED | OUTPATIENT
Start: 2020-02-14 | End: 2020-04-24 | Stop reason: SDUPTHER

## 2020-02-14 NOTE — PROGRESS NOTES
Subjective   Dave Chavez is a 39 y.o. male. Patient is here today for a complete physical exam.  He generally feels well and his only complaint is right elbow pain with certain movements or positions.  Unfortunately has not been improving and interferes with his exercising  PMH-history of hypertension, gout, hyperlipidemia, ADD and kidney stones.  He also has had a tonsillectomy and vasectomy in the past.  SH-patient's , sexually active, does not smoke cigarettes having quit in 2015 and has 8-10 alcoholic drinks per week.  He has regular dental and vision checks and exercises regularly as he is a  in the .  FH-parents are both in their upper 50s and generally healthy, no family diseases  Chief Complaint   Patient presents with   • Annual Exam     PT HERE FOR CPE          Vitals:    02/14/20 1446   BP: 130/90   Pulse: 85   Resp: 16   Temp: 98 °F (36.7 °C)   SpO2: 99%     Body mass index is 25.95 kg/m².    The following portions of the patient's history were reviewed and updated as appropriate: allergies, current medications, past family history, past medical history, past social history, past surgical history and problem list.    History reviewed. No pertinent past medical history.   Allergies   Allergen Reactions   • Penicillins Other (See Comments)      Social History     Socioeconomic History   • Marital status:      Spouse name: Not on file   • Number of children: Not on file   • Years of education: Not on file   • Highest education level: Not on file   Tobacco Use   • Smoking status: Former Smoker     Types: Cigarettes   • Smokeless tobacco: Never Used   • Tobacco comment:  1/2 a pack from age 21 to 35   Substance and Sexual Activity   • Alcohol use: Yes     Frequency: 4 or more times a week   • Drug use: No        Current Outpatient Medications:   •  allopurinol (ZYLOPRIM) 300 MG tablet, Take 1.5 tablets by mouth Daily., Disp: 135 tablet, Rfl: 1  •  ALPRAZolam (XANAX) 0.25 MG  tablet, Take 1 tablet by mouth 3 (Three) Times a Day As Needed for Anxiety., Disp: 30 tablet, Rfl: 0  •  amphetamine-dextroamphetamine XR (ADDERALL XR) 10 MG 24 hr capsule, Take 1 capsule by mouth 2 (Two) Times a Day, Disp: 60 capsule, Rfl: 0  •  colchicine 0.6 MG tablet, TAKE 2 PO WITH GOUT FLARE AND CAN REPEAT IN 6 HOURS IF NEEDED.  CAN TAKE 1 PO DAILY AS NEEDED, Disp: 30 tablet, Rfl: 5  •  lisinopril (PRINIVIL,ZESTRIL) 10 MG tablet, Take 1 tablet by mouth Daily., Disp: 90 tablet, Rfl: 3  •  PARoxetine (PAXIL) 10 MG tablet, Take 1 tablet by mouth Every Morning., Disp: 30 tablet, Rfl: 3  •  rosuvastatin (CRESTOR) 5 MG tablet, Take 1 tablet by mouth Daily., Disp: 90 tablet, Rfl: 3     EKG  ECG Report    Objective   History of Present Illness   Dave Chavez 39 y.o. male who presents for an Annual Wellness Visit.  he has a history of   Patient Active Problem List   Diagnosis   • Attention deficit disorder (ADD) without hyperactivity   • BP (high blood pressure)   • Chronic fatigue   • Gout   • Bilateral knee pain   • Abnormal LFTs (liver function tests)   • Anxiety and depression   • Insomnia   • Other hyperlipidemia   • Panic attack as reaction to stress   • Right elbow pain   .  he has been doing well with new interval problems.  Labs results discussed in detail with the patient.  Plan to update vaccines if needed today.    Health Habits:  Dental Exam. up to date  Eye Exam. up to date  Exercise: 5 times/week.  Current exercise activities include: tapes/CDs/TV fitness programs at home      Lab Results (most recent)     None            Review of Systems   Constitutional: Negative.    HENT: Negative.    Eyes: Negative.    Respiratory: Negative.    Cardiovascular: Negative.    Gastrointestinal: Negative.    Genitourinary: Negative.    Musculoskeletal: Negative.    Skin: Negative.    Neurological: Negative.    Psychiatric/Behavioral: Negative.        Physical Exam   Constitutional: He is oriented to person, place, and  time. He appears well-developed and well-nourished.   Pleasant, cooperative no acute distress, blood pressure 135/80   HENT:   Head: Normocephalic and atraumatic.   Right Ear: Hearing, tympanic membrane, external ear and ear canal normal.   Left Ear: Hearing, tympanic membrane, external ear and ear canal normal.   Nose: Nose normal. Right sinus exhibits no maxillary sinus tenderness and no frontal sinus tenderness. Left sinus exhibits no maxillary sinus tenderness and no frontal sinus tenderness.   Mouth/Throat: Uvula is midline, oropharynx is clear and moist and mucous membranes are normal. No tonsillar exudate.   Eyes: Pupils are equal, round, and reactive to light. Conjunctivae and EOM are normal. Right eye exhibits no discharge. Left eye exhibits no discharge. No scleral icterus.   Neck: Normal range of motion. Neck supple. No JVD present. No tracheal deviation present. No thyromegaly present.   Cardiovascular: Normal rate, regular rhythm, normal heart sounds and intact distal pulses. Exam reveals no gallop and no friction rub.   No murmur heard.  Pulmonary/Chest: Effort normal and breath sounds normal. No stridor. No respiratory distress. He has no wheezes. He has no rales. He exhibits no tenderness.   Abdominal: Soft. Bowel sounds are normal. He exhibits no distension and no mass. There is no tenderness. There is no rebound and no guarding.   Musculoskeletal: Normal range of motion. He exhibits tenderness. He exhibits no edema or deformity.   Some tenderness in the right lateral elbow   Lymphadenopathy:     He has no cervical adenopathy.   Neurological: He is alert and oriented to person, place, and time. No cranial nerve deficit.   Skin: Skin is warm and dry.   Psychiatric: He has a normal mood and affect. His behavior is normal. Judgment and thought content normal.   Nursing note and vitals reviewed.      ASSESSMENT EKG was normal.  CBC had a minimally low white cell count that is probably not significant  and was otherwise normal.  CMP had a minimally elevated ALT of 49 and was otherwise normal and hemoglobin A1c was minimally high at 5.7.  Uric acid level is well controlled at 2.8.  Urinalysis was essentially clear and lipid panel is controlled with total cholesterol 185, HDL of 69 and LDL of 99  #1-hypertension, controlled  #2-hyperlipidemia, controlled  #3-right elbow pain consistent with tendinitis, persistent  #4-ADD, controlled on medications  #5-gout, asymptomatic and controlled  #6-anxiety and panic attacks, controlled on medication       Problem List Items Addressed This Visit        Cardiovascular and Mediastinum    BP (high blood pressure)    Relevant Medications    lisinopril (PRINIVIL,ZESTRIL) 10 MG tablet    Other hyperlipidemia - Primary    Relevant Medications    rosuvastatin (CRESTOR) 5 MG tablet       Nervous and Auditory    Right elbow pain    Relevant Orders    Ambulatory Referral to Orthopedic Surgery       Other    Attention deficit disorder (ADD) without hyperactivity    Gout    Abnormal LFTs (liver function tests)    Panic attack as reaction to stress          PLAN I have referred the patient to orthopedics for his right elbow pain.  He will continue his other medicines as now and I would like to recheck him in 6 months with a CMP, lipid panel and hemoglobin A1c    There are no Patient Instructions on file for this visit.    Return in about 6 months (around 8/14/2020) for with labs.

## 2020-03-03 RX ORDER — DEXTROAMPHETAMINE SACCHARATE, AMPHETAMINE ASPARTATE MONOHYDRATE, DEXTROAMPHETAMINE SULFATE AND AMPHETAMINE SULFATE 2.5; 2.5; 2.5; 2.5 MG/1; MG/1; MG/1; MG/1
10 CAPSULE, EXTENDED RELEASE ORAL 2 TIMES DAILY
Qty: 60 CAPSULE | Refills: 0 | Status: SHIPPED | OUTPATIENT
Start: 2020-03-03 | End: 2020-04-24 | Stop reason: SDUPTHER

## 2020-03-12 DIAGNOSIS — F41.0 PANIC ATTACK AS REACTION TO STRESS: ICD-10-CM

## 2020-03-12 DIAGNOSIS — F43.0 PANIC ATTACK AS REACTION TO STRESS: ICD-10-CM

## 2020-03-13 RX ORDER — ALPRAZOLAM 0.25 MG/1
0.25 TABLET ORAL 3 TIMES DAILY PRN
Qty: 30 TABLET | Refills: 0 | Status: SHIPPED | OUTPATIENT
Start: 2020-03-13 | End: 2020-08-17 | Stop reason: SDUPTHER

## 2020-03-16 ENCOUNTER — TELEPHONE (OUTPATIENT)
Dept: FAMILY MEDICINE CLINIC | Facility: CLINIC | Age: 40
End: 2020-03-16

## 2020-03-16 NOTE — TELEPHONE ENCOUNTER
Family allergy and asthma is needing an updated [facility] referral for allergy services backdated to 3/9 if possible, if not today's date will work.

## 2020-04-24 RX ORDER — LISINOPRIL 10 MG/1
10 TABLET ORAL DAILY
Qty: 90 TABLET | Refills: 3 | Status: SHIPPED | OUTPATIENT
Start: 2020-04-24 | End: 2020-11-03 | Stop reason: SDUPTHER

## 2020-04-24 RX ORDER — ROSUVASTATIN CALCIUM 5 MG/1
5 TABLET, COATED ORAL DAILY
Qty: 90 TABLET | Refills: 3 | Status: SHIPPED | OUTPATIENT
Start: 2020-04-24 | End: 2020-11-03 | Stop reason: SDUPTHER

## 2020-04-24 RX ORDER — DEXTROAMPHETAMINE SACCHARATE, AMPHETAMINE ASPARTATE MONOHYDRATE, DEXTROAMPHETAMINE SULFATE AND AMPHETAMINE SULFATE 2.5; 2.5; 2.5; 2.5 MG/1; MG/1; MG/1; MG/1
10 CAPSULE, EXTENDED RELEASE ORAL 2 TIMES DAILY
Qty: 60 CAPSULE | Refills: 0 | OUTPATIENT
Start: 2020-04-24

## 2020-04-24 RX ORDER — DEXTROAMPHETAMINE SACCHARATE, AMPHETAMINE ASPARTATE MONOHYDRATE, DEXTROAMPHETAMINE SULFATE AND AMPHETAMINE SULFATE 2.5; 2.5; 2.5; 2.5 MG/1; MG/1; MG/1; MG/1
10 CAPSULE, EXTENDED RELEASE ORAL 2 TIMES DAILY
Qty: 60 CAPSULE | Refills: 0 | Status: SHIPPED | OUTPATIENT
Start: 2020-04-24 | End: 2020-06-29 | Stop reason: SDUPTHER

## 2020-04-24 RX ORDER — PAROXETINE 10 MG/1
10 TABLET, FILM COATED ORAL EVERY MORNING
Qty: 30 TABLET | Refills: 3 | Status: SHIPPED | OUTPATIENT
Start: 2020-04-24 | End: 2020-08-21

## 2020-06-08 ENCOUNTER — OFFICE VISIT (OUTPATIENT)
Dept: ORTHOPEDIC SURGERY | Facility: CLINIC | Age: 40
End: 2020-06-08

## 2020-06-08 VITALS — HEIGHT: 71 IN | BODY MASS INDEX: 25.2 KG/M2 | WEIGHT: 180 LBS | TEMPERATURE: 96.9 F

## 2020-06-08 DIAGNOSIS — M25.521 RIGHT ELBOW PAIN: Primary | ICD-10-CM

## 2020-06-08 PROCEDURE — 99203 OFFICE O/P NEW LOW 30 MIN: CPT | Performed by: ORTHOPAEDIC SURGERY

## 2020-06-08 PROCEDURE — 73070 X-RAY EXAM OF ELBOW: CPT | Performed by: ORTHOPAEDIC SURGERY

## 2020-06-08 RX ORDER — MELOXICAM 15 MG/1
15 TABLET ORAL DAILY PRN
Qty: 30 TABLET | Refills: 2 | Status: SHIPPED | OUTPATIENT
Start: 2020-06-08 | End: 2021-11-10

## 2020-06-08 NOTE — PROGRESS NOTES
Dave Chavez     : 1980     MRN: [id number]     DATE: 2020    Chief Complaints:  Right elbow pain    History of Present Illness:     39 y.o. male patient who presents for evaluation of the right elbow.  The patient reports that the symptoms began about 9 months ago.  Denies any injury.  Pain is described as moderate, intermittent and stabbing.  The pain is associated with certain reaching and lifting movements.  The patient localizes the pain to the lateral aspect of the elbow.  Rest and anti-inflammatories do help somewhat.    Allergies:   Allergies   Allergen Reactions   • Penicillins Other (See Comments)       Home Medications:    Current Outpatient Medications:   •  allopurinol (ZYLOPRIM) 300 MG tablet, Take 1.5 tablets by mouth Daily., Disp: 135 tablet, Rfl: 1  •  colchicine 0.6 MG tablet, TAKE 2 PO WITH GOUT FLARE AND CAN REPEAT IN 6 HOURS IF NEEDED.  CAN TAKE 1 PO DAILY AS NEEDED, Disp: 30 tablet, Rfl: 5  •  lisinopril (PRINIVIL,ZESTRIL) 10 MG tablet, Take 1 tablet by mouth Daily., Disp: 90 tablet, Rfl: 3  •  PARoxetine (Paxil) 10 MG tablet, Take 1 tablet by mouth Every Morning., Disp: 30 tablet, Rfl: 3  •  rosuvastatin (Crestor) 5 MG tablet, Take 1 tablet by mouth Daily., Disp: 90 tablet, Rfl: 3  •  ALPRAZolam (XANAX) 0.25 MG tablet, Take 1 tablet by mouth 3 (Three) Times a Day As Needed for Anxiety., Disp: 30 tablet, Rfl: 0  •  amphetamine-dextroamphetamine XR (Adderall XR) 10 MG 24 hr capsule, Take 1 capsule by mouth 2 (Two) Times a Day, Disp: 60 capsule, Rfl: 0  History reviewed. No pertinent past medical history.  Past Surgical History:   Procedure Laterality Date   • KIDNEY STONE SURGERY     • TONSILLECTOMY     • VASECTOMY       Social History     Occupational History   • Not on file   Tobacco Use   • Smoking status: Former Smoker     Types: Cigarettes   • Smokeless tobacco: Never Used   • Tobacco comment:  1/2 a pack from age 21 to 35   Substance and Sexual Activity   • Alcohol use: Yes     " Frequency: 4 or more times a week   • Drug use: No   • Sexual activity: Defer      Social History     Social History Narrative   • Not on file     History reviewed. No pertinent family history.    Review of Systems:      Constitutional: Denies fever, shaking or chills   Eyes: Denies change in visual acuity   HEENT: Denies nasal congestion or sore throat   Respiratory: Denies cough or shortness of breath   Cardiovascular: Denies chest pain or edema  Endocrine: Denies tremors, palpitations, intolerance of heat or cold, polyuria, polydipsia.  GI: Denies abdominal pain, nausea, vomiting, bloody stools or diarrhea  : Denies frequency, urgency, incontinence, retention, or nocturia.  Musculoskeletal: Denies numbness, tingling or loss of motor function except as above  Integument: Denies rash, lesion or ulceration   Neurologic: Denies headache or focal weakness, deficits  Heme: Denies spontaneous or excessive bleeding, epistaxis, hematuria, melena, fatigue, enlarged or tender lymph nodes.      All other pertinent positives and negatives as noted above in HPI.    Physical Exam: 39 y.o. male    Vitals:    06/08/20 0939   Temp: 96.9 °F (36.1 °C)   Weight: 81.6 kg (180 lb)   Height: 180.3 cm (71\")     General:  Patient is awake and alert.  Appears in no acute distress or discomfort.    Psych:  Affect and demeanor are appropriate.    Eyes:  Conjunctiva and sclera appear grossly normal.  Eyes track well and EOM seem to be intact.    Ears:  No gross abnormalities.  Hearing adequate for the exam.    Cardiovascular:  Regular rate and rhythm.    Lungs:  Good chest expansion.  Breathing unlabored.    Extremities:  Right elbow skin appears benign.  No obvious lesions, swellings, masses or adenopathy.  Focal tenderness noted over the lateral epicondyle.  No tenderness over the radial tunnel or medial side.  Full elbow motion.  No instability.  Good strength with elbow flexion, extension, supination, pronation.  Pain at the lateral " epicondyle with resisted wrist extension.  Good strength in the hand with , pinch, finger and thumb abduction.  Sensation is intact and subjectively normal.  Palpable radial pulse with good skin turgor and brisk capillary refill.    DIAGNOSTIC STUDIES    Xrays:  AP and lateral views of the right elbow are ordered by myself and reviewed to evaluate the patient's complaint.  No comparison films are immediately available.  The x-rays show no obvious acute abnormalities, lesions, masses, significant degenerative changes, or other concerning findings.    ASSESSMENT: Right elbow lateral epicondylitis    PLAN:  We discussed options in detail, both surgical and non-surgical.  I have recommended that we start with a conservative approach.  We discussed all available conservative treatment options including activity modifications, bracing, anti-inflammatories, physical therapy, and injections.  I explained the likely short-term benefits of cortisone injection and the associated risks.  We also talked about the available evidence on PRP which suggest that this could have up to an 80% success rate at 6 months.  The patient acknowledged understanding of this information.  He is going to think about the PRP.  He is going to do some research and let me know.  For now, he has elected for a trial of anti-inflammatories and physical therapy.  I gave him prescription for meloxicam.  Risks of the medicine were discussed.  I also referred him to physical therapy.  Last, he requested a work note to limit his activities.  I did give him this at his request.    Kleber Babcock MD    06/08/2020    CC to Jovon Ahumada MD

## 2020-06-09 ENCOUNTER — TELEPHONE (OUTPATIENT)
Dept: ORTHOPEDIC SURGERY | Facility: CLINIC | Age: 40
End: 2020-06-09

## 2020-06-09 NOTE — TELEPHONE ENCOUNTER
[facility] NEEDS AUTHORIZATION FOR THIS PATIENTS THERAPY, HE HAS FRANCISCO JAVIER, SAID THE REFERRAL AUTH NEEDS TO COME FROM OUR [facility]. PLEASE ADVISE WHO DOES THIS IF NOT YOU

## 2020-06-09 NOTE — TELEPHONE ENCOUNTER
PHYSICAL THERAPY FROM [facility] NEEDS A NEW ORDER FAXED TO THEM FOR OT NOT PT SINCE IT IS [facility]IENTS [facility]

## 2020-06-19 ENCOUNTER — TELEPHONE (OUTPATIENT)
Dept: ORTHOPEDIC SURGERY | Facility: CLINIC | Age: 40
End: 2020-06-19

## 2020-06-30 RX ORDER — DEXTROAMPHETAMINE SACCHARATE, AMPHETAMINE ASPARTATE MONOHYDRATE, DEXTROAMPHETAMINE SULFATE AND AMPHETAMINE SULFATE 2.5; 2.5; 2.5; 2.5 MG/1; MG/1; MG/1; MG/1
10 CAPSULE, EXTENDED RELEASE ORAL 2 TIMES DAILY
Qty: 60 CAPSULE | Refills: 0 | Status: SHIPPED | OUTPATIENT
Start: 2020-06-30 | End: 2020-08-17 | Stop reason: SDUPTHER

## 2020-07-06 DIAGNOSIS — M25.521 RIGHT ELBOW PAIN: Primary | ICD-10-CM

## 2020-08-14 DIAGNOSIS — E78.49 OTHER HYPERLIPIDEMIA: ICD-10-CM

## 2020-08-14 DIAGNOSIS — Z11.59 NEED FOR HEPATITIS C SCREENING TEST: ICD-10-CM

## 2020-08-14 DIAGNOSIS — R73.9 ELEVATED BLOOD SUGAR: ICD-10-CM

## 2020-08-17 DIAGNOSIS — F43.0 PANIC ATTACK AS REACTION TO STRESS: ICD-10-CM

## 2020-08-17 DIAGNOSIS — F41.0 PANIC ATTACK AS REACTION TO STRESS: ICD-10-CM

## 2020-08-18 ENCOUNTER — RESULTS ENCOUNTER (OUTPATIENT)
Dept: FAMILY MEDICINE CLINIC | Facility: CLINIC | Age: 40
End: 2020-08-18

## 2020-08-18 DIAGNOSIS — R73.9 ELEVATED BLOOD SUGAR: ICD-10-CM

## 2020-08-18 DIAGNOSIS — Z11.59 NEED FOR HEPATITIS C SCREENING TEST: ICD-10-CM

## 2020-08-18 DIAGNOSIS — E78.49 OTHER HYPERLIPIDEMIA: ICD-10-CM

## 2020-08-18 LAB
ALBUMIN SERPL-MCNC: 4.6 G/DL (ref 3.5–5.2)
ALBUMIN/GLOB SERPL: 2.1 G/DL
ALP SERPL-CCNC: 71 U/L (ref 39–117)
ALT SERPL-CCNC: 111 U/L (ref 1–41)
AST SERPL-CCNC: 64 U/L (ref 1–40)
BILIRUB SERPL-MCNC: 0.4 MG/DL (ref 0–1.2)
BUN SERPL-MCNC: 9 MG/DL (ref 6–20)
BUN/CREAT SERPL: 7.6 (ref 7–25)
CALCIUM SERPL-MCNC: 9.7 MG/DL (ref 8.6–10.5)
CHLORIDE SERPL-SCNC: 99 MMOL/L (ref 98–107)
CHOLEST SERPL-MCNC: 258 MG/DL (ref 0–200)
CO2 SERPL-SCNC: 27.8 MMOL/L (ref 22–29)
CREAT SERPL-MCNC: 1.19 MG/DL (ref 0.76–1.27)
GLOBULIN SER CALC-MCNC: 2.2 GM/DL
GLUCOSE SERPL-MCNC: 101 MG/DL (ref 65–99)
HBA1C MFR BLD: 5.2 % (ref 4.8–5.6)
HCV AB S/CO SERPL IA: <0.1 S/CO RATIO (ref 0–0.9)
HCV AB SERPL QL IA: NORMAL
HDLC SERPL-MCNC: 75 MG/DL (ref 40–60)
LDLC SERPL CALC-MCNC: 143 MG/DL (ref 0–100)
LDLC/HDLC SERPL: 1.91 {RATIO}
POTASSIUM SERPL-SCNC: 4.6 MMOL/L (ref 3.5–5.2)
PROT SERPL-MCNC: 6.8 G/DL (ref 6–8.5)
SODIUM SERPL-SCNC: 139 MMOL/L (ref 136–145)
TRIGL SERPL-MCNC: 200 MG/DL (ref 0–150)
VLDLC SERPL CALC-MCNC: 40 MG/DL (ref 5–40)

## 2020-08-18 RX ORDER — ALPRAZOLAM 0.25 MG/1
0.25 TABLET ORAL 3 TIMES DAILY PRN
Qty: 30 TABLET | Refills: 0 | Status: SHIPPED | OUTPATIENT
Start: 2020-08-18 | End: 2020-11-03 | Stop reason: SDUPTHER

## 2020-08-18 RX ORDER — DEXTROAMPHETAMINE SACCHARATE, AMPHETAMINE ASPARTATE MONOHYDRATE, DEXTROAMPHETAMINE SULFATE AND AMPHETAMINE SULFATE 2.5; 2.5; 2.5; 2.5 MG/1; MG/1; MG/1; MG/1
10 CAPSULE, EXTENDED RELEASE ORAL 2 TIMES DAILY
Qty: 60 CAPSULE | Refills: 0 | Status: SHIPPED | OUTPATIENT
Start: 2020-08-18 | End: 2020-11-03 | Stop reason: SDUPTHER

## 2020-08-21 ENCOUNTER — OFFICE VISIT (OUTPATIENT)
Dept: FAMILY MEDICINE CLINIC | Facility: CLINIC | Age: 40
End: 2020-08-21

## 2020-08-21 VITALS
OXYGEN SATURATION: 98 % | TEMPERATURE: 97.7 F | DIASTOLIC BLOOD PRESSURE: 80 MMHG | BODY MASS INDEX: 26.6 KG/M2 | RESPIRATION RATE: 16 BRPM | WEIGHT: 190 LBS | SYSTOLIC BLOOD PRESSURE: 122 MMHG | HEART RATE: 84 BPM | HEIGHT: 71 IN

## 2020-08-21 DIAGNOSIS — M10.9 GOUT, UNSPECIFIED CAUSE, UNSPECIFIED CHRONICITY, UNSPECIFIED SITE: ICD-10-CM

## 2020-08-21 DIAGNOSIS — E78.49 OTHER HYPERLIPIDEMIA: ICD-10-CM

## 2020-08-21 DIAGNOSIS — F41.0 PANIC ATTACK AS REACTION TO STRESS: ICD-10-CM

## 2020-08-21 DIAGNOSIS — F43.0 PANIC ATTACK AS REACTION TO STRESS: ICD-10-CM

## 2020-08-21 DIAGNOSIS — R79.89 ABNORMAL LFTS (LIVER FUNCTION TESTS): ICD-10-CM

## 2020-08-21 DIAGNOSIS — F41.9 ANXIETY AND DEPRESSION: ICD-10-CM

## 2020-08-21 DIAGNOSIS — F98.8 ATTENTION DEFICIT DISORDER (ADD) WITHOUT HYPERACTIVITY: ICD-10-CM

## 2020-08-21 DIAGNOSIS — E29.1 HYPOGONADISM IN MALE: Primary | ICD-10-CM

## 2020-08-21 DIAGNOSIS — G47.00 INSOMNIA, UNSPECIFIED TYPE: ICD-10-CM

## 2020-08-21 DIAGNOSIS — I10 ESSENTIAL HYPERTENSION: ICD-10-CM

## 2020-08-21 DIAGNOSIS — F32.A ANXIETY AND DEPRESSION: ICD-10-CM

## 2020-08-21 PROCEDURE — 99214 OFFICE O/P EST MOD 30 MIN: CPT | Performed by: INTERNAL MEDICINE

## 2020-08-21 NOTE — PROGRESS NOTES
Subjective   Dave Chavez is a 39 y.o. male. Patient is here today for follow-up on his hypertension, hyperlipidemia, anxiety and panic attacks and insomnia, ADD and history of gout and mild elevation of liver tests.  He is feeling well and has had no chest pain, shortness of breath, edema or myalgias.  He admits to not taking his Crestor regularly and also has not been on the allopurinol.  He fortunately has had no gout attacks.  Chief Complaint   Patient presents with   • Hyperlipidemia     HTN, ADD, ANXIETY- PT HERE FOR FOLLOW UP           Vitals:    08/21/20 1422   BP: 122/80   Pulse: 84   Resp: 16   Temp: 97.7 °F (36.5 °C)   SpO2: 98%     Body mass index is 26.51 kg/m².  The following portions of the patient's history were reviewed and updated as appropriate: allergies, current medications, past family history, past medical history, past social history, past surgical history and problem list.    History reviewed. No pertinent past medical history.   Allergies   Allergen Reactions   • Penicillins Other (See Comments)      Social History     Socioeconomic History   • Marital status:      Spouse name: Not on file   • Number of children: Not on file   • Years of education: Not on file   • Highest education level: Not on file   Tobacco Use   • Smoking status: Former Smoker     Types: Cigarettes   • Smokeless tobacco: Never Used   • Tobacco comment:  1/2 a pack from age 21 to 35   Substance and Sexual Activity   • Alcohol use: Yes     Frequency: 4 or more times a week   • Drug use: No   • Sexual activity: Defer        Current Outpatient Medications:   •  allopurinol (ZYLOPRIM) 300 MG tablet, Take 1.5 tablets by mouth Daily., Disp: 135 tablet, Rfl: 1  •  ALPRAZolam (Xanax) 0.25 MG tablet, Take 1 tablet by mouth 3 (Three) Times a Day As Needed for Anxiety., Disp: 30 tablet, Rfl: 0  •  amphetamine-dextroamphetamine XR (Adderall XR) 10 MG 24 hr capsule, Take 1 capsule by mouth 2 (Two) Times a Day, Disp: 60  capsule, Rfl: 0  •  colchicine 0.6 MG tablet, TAKE 2 PO WITH GOUT FLARE AND CAN REPEAT IN 6 HOURS IF NEEDED.  CAN TAKE 1 PO DAILY AS NEEDED, Disp: 30 tablet, Rfl: 5  •  lisinopril (PRINIVIL,ZESTRIL) 10 MG tablet, Take 1 tablet by mouth Daily., Disp: 90 tablet, Rfl: 3  •  meloxicam (MOBIC) 15 MG tablet, Take 1 tablet by mouth Daily As Needed for Moderate Pain . Take as directed with food., Disp: 30 tablet, Rfl: 2  •  rosuvastatin (Crestor) 5 MG tablet, Take 1 tablet by mouth Daily., Disp: 90 tablet, Rfl: 3     Objective     History of Present Illness     Review of Systems   Constitutional: Negative.    HENT: Negative.    Respiratory: Negative.    Cardiovascular: Negative.    Gastrointestinal: Negative.    Genitourinary: Negative.    Musculoskeletal: Negative.    Skin: Negative.    Neurological: Negative.    Psychiatric/Behavioral: Negative.        Physical Exam   Constitutional: He is oriented to person, place, and time. He appears well-developed and well-nourished.   Pleasant, cooperative no acute distress, blood pressure 120/80   HENT:   Head: Normocephalic and atraumatic.   Eyes: Conjunctivae are normal. No scleral icterus.   Neck: Normal range of motion. Neck supple.   Cardiovascular: Normal rate, regular rhythm and normal heart sounds.   Pulmonary/Chest: Effort normal and breath sounds normal. No respiratory distress. He has no wheezes. He has no rales.   Abdominal: Soft. Bowel sounds are normal.   Musculoskeletal: Normal range of motion. He exhibits no edema.   Neurological: He is alert and oriented to person, place, and time.   Skin: Skin is warm and dry.   Psychiatric: He has a normal mood and affect. His behavior is normal.   Nursing note and vitals reviewed.      ASSESSMENT CMP has a sugar of 101 and slightly higher than usual AST of 64 and ALT of 111.  Hemoglobin A1c is improved and is back to normal at 5.2.  Hepatitis C screen was negative.  Lipid panel is high since he has been missing the Crestor with a  total cholesterol of 258, HDL of 75 and   #1-hypertension controlled  #2-hyperlipidemia, not controlled off medication  #3-hyperglycemia, mild and asymptomatic with normal hemoglobin A1c, diet controlled  #4-mildly elevated LFTs that are overall stable  #5-anxiety and panic attacks, stable on medication  #6-gout with no recent attacks  #7 ADD, stable on medication     Problem List Items Addressed This Visit        Cardiovascular and Mediastinum    BP (high blood pressure)    Other hyperlipidemia       Other    Attention deficit disorder (ADD) without hyperactivity    Abnormal LFTs (liver function tests)    Anxiety and depression    Insomnia    Panic attack as reaction to stress      Other Visit Diagnoses     Hypogonadism in male    -  Primary          PLAN the patient received a flu shot and a Tdap immunization recently at [facility].  Only the flu shot shows on the record.  I encouraged him to take the Crestor and allopurinol regularly and he will continue other medications as now.  I would like to recheck him in 6 months with a CBC, CMP, lipid panel, hemoglobin A1c, uric acid level and TSH    There are no Patient Instructions on file for this visit.  Return in about 6 months (around 2/21/2021) for with labs.

## 2020-10-21 ENCOUNTER — OFFICE VISIT (OUTPATIENT)
Dept: FAMILY MEDICINE CLINIC | Facility: CLINIC | Age: 40
End: 2020-10-21

## 2020-10-21 VITALS
SYSTOLIC BLOOD PRESSURE: 120 MMHG | RESPIRATION RATE: 16 BRPM | DIASTOLIC BLOOD PRESSURE: 90 MMHG | HEART RATE: 93 BPM | HEIGHT: 71 IN | TEMPERATURE: 97.3 F | WEIGHT: 190 LBS | BODY MASS INDEX: 26.6 KG/M2 | OXYGEN SATURATION: 100 %

## 2020-10-21 DIAGNOSIS — M25.561 ACUTE PAIN OF RIGHT KNEE: Primary | ICD-10-CM

## 2020-10-21 PROCEDURE — 99213 OFFICE O/P EST LOW 20 MIN: CPT | Performed by: INTERNAL MEDICINE

## 2020-10-21 NOTE — PROGRESS NOTES
Subjective   Dave Chavez is a 40 y.o. male. Patient is here today for complaints of right knee pain.  Is been bothering him for about a month and he is wearing a knee brace.  He tells me that he had some pain and had an MRI done through the  which showed some damage about 10 years ago.  He has been using some naproxen.  He would like to see an orthopedist.  Chief Complaint   Patient presents with   • Knee Pain     PT HAVING RIGHT KNEE PAIN          Vitals:    10/21/20 1005   BP: 120/90   Pulse: 93   Resp: 16   Temp: 97.3 °F (36.3 °C)   SpO2: 100%     Body mass index is 26.51 kg/m².  The following portions of the patient's history were reviewed and updated as appropriate: allergies, current medications, past family history, past medical history, past social history, past surgical history and problem list.    History reviewed. No pertinent past medical history.   Allergies   Allergen Reactions   • Penicillins Other (See Comments)      Social History     Socioeconomic History   • Marital status:      Spouse name: Not on file   • Number of children: Not on file   • Years of education: Not on file   • Highest education level: Not on file   Tobacco Use   • Smoking status: Former Smoker     Types: Cigarettes   • Smokeless tobacco: Never Used   • Tobacco comment:  1/2 a pack from age 21 to 35   Substance and Sexual Activity   • Alcohol use: Yes     Frequency: 4 or more times a week   • Drug use: No   • Sexual activity: Defer        Current Outpatient Medications:   •  allopurinol (ZYLOPRIM) 300 MG tablet, Take 1.5 tablets by mouth Daily., Disp: 135 tablet, Rfl: 1  •  ALPRAZolam (Xanax) 0.25 MG tablet, Take 1 tablet by mouth 3 (Three) Times a Day As Needed for Anxiety., Disp: 30 tablet, Rfl: 0  •  amphetamine-dextroamphetamine XR (Adderall XR) 10 MG 24 hr capsule, Take 1 capsule by mouth 2 (Two) Times a Day, Disp: 60 capsule, Rfl: 0  •  colchicine 0.6 MG tablet, TAKE 2 PO WITH GOUT FLARE AND CAN REPEAT IN 6  HOURS IF NEEDED.  CAN TAKE 1 PO DAILY AS NEEDED, Disp: 30 tablet, Rfl: 5  •  lisinopril (PRINIVIL,ZESTRIL) 10 MG tablet, Take 1 tablet by mouth Daily., Disp: 90 tablet, Rfl: 3  •  meloxicam (MOBIC) 15 MG tablet, Take 1 tablet by mouth Daily As Needed for Moderate Pain . Take as directed with food., Disp: 30 tablet, Rfl: 2  •  rosuvastatin (Crestor) 5 MG tablet, Take 1 tablet by mouth Daily., Disp: 90 tablet, Rfl: 3     Objective     History of Present Illness     Review of Systems   Constitutional: Negative.    HENT: Negative.    Respiratory: Negative.    Cardiovascular: Negative.    Gastrointestinal: Negative.    Genitourinary: Negative.    Musculoskeletal:        Right knee pain   Skin: Negative.    Neurological: Negative.    Psychiatric/Behavioral: Negative.        Physical Exam  Vitals signs and nursing note reviewed.   Constitutional:       General: He is not in acute distress.     Appearance: Normal appearance. He is normal weight. He is not ill-appearing.   HENT:      Head: Normocephalic and atraumatic.   Eyes:      General: No scleral icterus.     Conjunctiva/sclera: Conjunctivae normal.   Neck:      Musculoskeletal: Normal range of motion and neck supple.   Cardiovascular:      Rate and Rhythm: Normal rate and regular rhythm.      Heart sounds: Normal heart sounds.   Pulmonary:      Effort: Pulmonary effort is normal. No respiratory distress.      Breath sounds: Normal breath sounds. No wheezing or rales.   Musculoskeletal:      Comments: Brace on the right knee, no swelling, full range of motion   Skin:     General: Skin is warm and dry.   Neurological:      General: No focal deficit present.      Mental Status: He is alert and oriented to person, place, and time.   Psychiatric:         Mood and Affect: Mood normal.         Behavior: Behavior normal.         ASSESSMENT #1-right knee pain for a month without improvement, history of prior damage to the knee in the      Problems Addressed this Visit      None      Visit Diagnoses     Acute pain of right knee    -  Primary    Relevant Orders    Ambulatory Referral to Orthopedic Surgery      Diagnoses       Codes Comments    Acute pain of right knee    -  Primary ICD-10-CM: M25.561  ICD-9-CM: 719.46           PLAN I am referring the patient to orthopedics for further evaluation and treatment.  He is already scheduled for follow-up with me in February with laboratory studies and will keep that appointment    There are no Patient Instructions on file for this visit.  No follow-ups on file.

## 2020-11-03 ENCOUNTER — OFFICE VISIT (OUTPATIENT)
Dept: ORTHOPEDIC SURGERY | Facility: CLINIC | Age: 40
End: 2020-11-03

## 2020-11-03 VITALS — WEIGHT: 185 LBS | HEIGHT: 71 IN | BODY MASS INDEX: 25.9 KG/M2 | TEMPERATURE: 97.1 F

## 2020-11-03 DIAGNOSIS — F41.0 PANIC ATTACK AS REACTION TO STRESS: ICD-10-CM

## 2020-11-03 DIAGNOSIS — F43.0 PANIC ATTACK AS REACTION TO STRESS: ICD-10-CM

## 2020-11-03 DIAGNOSIS — M25.521 RIGHT ELBOW PAIN: Primary | ICD-10-CM

## 2020-11-03 DIAGNOSIS — M25.561 RIGHT KNEE PAIN, UNSPECIFIED CHRONICITY: ICD-10-CM

## 2020-11-03 PROCEDURE — 99213 OFFICE O/P EST LOW 20 MIN: CPT | Performed by: ORTHOPAEDIC SURGERY

## 2020-11-03 PROCEDURE — 73562 X-RAY EXAM OF KNEE 3: CPT | Performed by: NURSE PRACTITIONER

## 2020-11-03 RX ORDER — ALLOPURINOL 300 MG/1
450 TABLET ORAL DAILY
Qty: 135 TABLET | Refills: 1 | Status: SHIPPED | OUTPATIENT
Start: 2020-11-03 | End: 2021-02-26 | Stop reason: SDUPTHER

## 2020-11-03 RX ORDER — ROSUVASTATIN CALCIUM 5 MG/1
5 TABLET, COATED ORAL DAILY
Qty: 90 TABLET | Refills: 3 | Status: SHIPPED | OUTPATIENT
Start: 2020-11-03 | End: 2021-11-10 | Stop reason: SDUPTHER

## 2020-11-03 RX ORDER — ALPRAZOLAM 0.25 MG/1
0.25 TABLET ORAL 3 TIMES DAILY PRN
Qty: 30 TABLET | Refills: 0 | Status: SHIPPED | OUTPATIENT
Start: 2020-11-03 | End: 2020-12-02 | Stop reason: SDUPTHER

## 2020-11-03 RX ORDER — DEXTROAMPHETAMINE SACCHARATE, AMPHETAMINE ASPARTATE MONOHYDRATE, DEXTROAMPHETAMINE SULFATE AND AMPHETAMINE SULFATE 2.5; 2.5; 2.5; 2.5 MG/1; MG/1; MG/1; MG/1
10 CAPSULE, EXTENDED RELEASE ORAL 2 TIMES DAILY
Qty: 60 CAPSULE | Refills: 0 | Status: SHIPPED | OUTPATIENT
Start: 2020-11-03 | End: 2020-12-02 | Stop reason: SDUPTHER

## 2020-11-03 RX ORDER — LISINOPRIL 10 MG/1
10 TABLET ORAL DAILY
Qty: 90 TABLET | Refills: 3 | Status: SHIPPED | OUTPATIENT
Start: 2020-11-03 | End: 2021-11-10 | Stop reason: SDUPTHER

## 2020-11-03 NOTE — PROGRESS NOTES
Patient: Dave Chavez  YOB: 1980 40 y.o. male  Medical Record Number: [id number]    Chief Complaints: Right knee pain    History of Present Illness:Dave Chavez is a 40 y.o. male who presents with complaints of intermittent knee pain and catching for the past 5 to 6 weeks.  He states he was just walking along about 6 weeks ago felt a sharp anterolateral stabbing pain and since then he has had intermittent episodes that are severe in nature that occur about every 2 to 3 days.  He did have a knee problem roughly 10 years ago.  He is not exactly sure what his diagnosis was but has been doing reasonably well with no running restrictions in the  since that time.    Allergies:   Allergies   Allergen Reactions   • Penicillins Other (See Comments)       Medications:   Current Outpatient Medications   Medication Sig Dispense Refill   • allopurinol (ZYLOPRIM) 300 MG tablet Take 1.5 tablets by mouth Daily. 135 tablet 1   • ALPRAZolam (Xanax) 0.25 MG tablet Take 1 tablet by mouth 3 (Three) Times a Day As Needed for Anxiety. 30 tablet 0   • amphetamine-dextroamphetamine XR (Adderall XR) 10 MG 24 hr capsule Take 1 capsule by mouth 2 (Two) Times a Day 60 capsule 0   • colchicine 0.6 MG tablet TAKE 2 PO WITH GOUT FLARE AND CAN REPEAT IN 6 HOURS IF NEEDED.  CAN TAKE 1 PO DAILY AS NEEDED 30 tablet 5   • lisinopril (PRINIVIL,ZESTRIL) 10 MG tablet Take 1 tablet by mouth Daily. 90 tablet 3   • meloxicam (MOBIC) 15 MG tablet Take 1 tablet by mouth Daily As Needed for Moderate Pain . Take as directed with food. 30 tablet 2   • rosuvastatin (Crestor) 5 MG tablet Take 1 tablet by mouth Daily. 90 tablet 3     No current facility-administered medications for this visit.          The following portions of the patient's history were reviewed and updated as appropriate: allergies, current medications, past family history, past medical history, past social history, past surgical history and problem list.    Review of  Systems:   A 14 point review of systems was performed. All systems negative except pertinent positives/negative listed in HPI above    Physical Exam:   There were no vitals filed for this visit.    General: A and O x 3, ASA, NAD    SCLERA:    Normal    DENTITION:   Normal     Knee:  right    ALIGNMENT:     Neutral  ,   Patella tracks   midline    GAIT:     Nonantalgic    SKIN:    No abnormality    RANGE OF MOTION:   0  -  135   DEG    STRENGTH:   5 / 5    LIGAMENTS:    No varus / valgus instability.   Negative  Lachman.    MENISCUS:     Negative   Kusum       DISTAL PULSES:    Paplable    DISTAL SENSATION :   Intact    LYMPHATICS:     No   lymphadenopathy    OTHER:          - No  effusion      - No crepitance with ROM      - No Swelling /  tenderness to palpation pes anserine bursa       Radiology:  Xrays 3views right knee (ap,lateral, sunrise) were ordered and reviewed for evaluation of knee pain demonstrating no abnormality on the AP view there is good preservation of joint space on the lateral view there is is an area at the center aspect of the joint surface of the patella that may be consistent with AVN or osteochondral site.  I have no previous films for comparison.    Assessment/Plan: Right knee intermittent pain which is fairly severe every couple few days ongoing for 6 weeks now I am going to get an MRI of the knee to evaluate for loose body or osteochondral defect.  He will call back for results if he has not heard from us within a couple days the MRI      Johanny Pruitt MA  11/3/2020

## 2020-11-23 ENCOUNTER — HOSPITAL ENCOUNTER (OUTPATIENT)
Dept: MRI IMAGING | Facility: HOSPITAL | Age: 40
Discharge: HOME | End: 2020-11-23
Admitting: ORTHOPAEDIC SURGERY

## 2020-11-23 DIAGNOSIS — M25.561 RIGHT KNEE PAIN, UNSPECIFIED CHRONICITY: ICD-10-CM

## 2020-11-23 PROCEDURE — 73721 MRI JNT OF LWR EXTRE W/O DYE: CPT

## 2020-12-02 DIAGNOSIS — F43.0 PANIC ATTACK AS REACTION TO STRESS: ICD-10-CM

## 2020-12-02 DIAGNOSIS — F41.0 PANIC ATTACK AS REACTION TO STRESS: ICD-10-CM

## 2020-12-02 RX ORDER — ALPRAZOLAM 0.25 MG/1
0.25 TABLET ORAL 3 TIMES DAILY PRN
Qty: 30 TABLET | Refills: 3 | Status: SHIPPED | OUTPATIENT
Start: 2020-12-02 | End: 2020-12-31 | Stop reason: SDUPTHER

## 2020-12-02 RX ORDER — DEXTROAMPHETAMINE SACCHARATE, AMPHETAMINE ASPARTATE MONOHYDRATE, DEXTROAMPHETAMINE SULFATE AND AMPHETAMINE SULFATE 2.5; 2.5; 2.5; 2.5 MG/1; MG/1; MG/1; MG/1
10 CAPSULE, EXTENDED RELEASE ORAL 2 TIMES DAILY
Qty: 60 CAPSULE | Refills: 0 | Status: SHIPPED | OUTPATIENT
Start: 2020-12-02 | End: 2020-12-31 | Stop reason: SDUPTHER

## 2020-12-03 NOTE — TELEPHONE ENCOUNTER
SPOKE WITH PT HE SAID HE IS DOING OK NOT REALLY BOTHERING HIM RIGHT NOW, I TOLD HIM I HAVE A CALL INTO [facility] BUT THEY WORK AT THEIR OWN PACE, HE SAID DON'T WORRY WHENEVER IT GETS APPROVED THAT IS FINE,LLR

## 2020-12-31 DIAGNOSIS — F41.0 PANIC ATTACK AS REACTION TO STRESS: ICD-10-CM

## 2020-12-31 DIAGNOSIS — F43.0 PANIC ATTACK AS REACTION TO STRESS: ICD-10-CM

## 2020-12-31 RX ORDER — DEXTROAMPHETAMINE SACCHARATE, AMPHETAMINE ASPARTATE MONOHYDRATE, DEXTROAMPHETAMINE SULFATE AND AMPHETAMINE SULFATE 2.5; 2.5; 2.5; 2.5 MG/1; MG/1; MG/1; MG/1
10 CAPSULE, EXTENDED RELEASE ORAL 2 TIMES DAILY
Qty: 60 CAPSULE | Refills: 0 | Status: SHIPPED | OUTPATIENT
Start: 2020-12-31 | End: 2021-01-26 | Stop reason: SDUPTHER

## 2020-12-31 RX ORDER — COLCHICINE 0.6 MG/1
TABLET ORAL
Qty: 30 TABLET | Refills: 5 | Status: SHIPPED | OUTPATIENT
Start: 2020-12-31 | End: 2023-01-19 | Stop reason: SDUPTHER

## 2020-12-31 RX ORDER — ALPRAZOLAM 0.25 MG/1
0.25 TABLET ORAL 3 TIMES DAILY PRN
Qty: 30 TABLET | Refills: 3 | Status: SHIPPED | OUTPATIENT
Start: 2020-12-31 | End: 2021-01-26 | Stop reason: SDUPTHER

## 2021-01-05 ENCOUNTER — TELEPHONE (OUTPATIENT)
Dept: ORTHOPEDIC SURGERY | Facility: CLINIC | Age: 41
End: 2021-01-05

## 2021-01-05 NOTE — TELEPHONE ENCOUNTER
----- Message from Dave Chavez sent at 1/5/2021  1:49 PM EST -----  Regarding: Referral Request  Contact: [phone]  Good afternoon! I was contacted after my MRI with the results. The gentleman said he would call back to schedule physical therapy. Is that something we can go ahead and move forward with doing?  Thanks for your time.

## 2021-01-05 NOTE — TELEPHONE ENCOUNTER
Patient was supposed to be scheduled for a follow-up visit in which she was going to get a steroid shot as well as physical therapy orders at that time.  Please schedule the patient for a follow-up visit we will give him the steroid shot and I will give him physical therapy after the shot.

## 2021-01-06 ENCOUNTER — TELEPHONE (OUTPATIENT)
Dept: ORTHOPEDIC SURGERY | Facility: CLINIC | Age: 41
End: 2021-01-06

## 2021-01-06 NOTE — TELEPHONE ENCOUNTER
----- Message from Dave Chavez sent at 1/6/2021 10:38 AM EST -----  Regarding: RE: Referral Request  Contact: [phone]  I had to reschedule since I travel quite a bit. The lady who scheduled my appointment was very rude and difficult to work with.     ----- Message -----  From: SANJEEV RUIZ  Sent: 1/5/21, 3:12 PM  To: Dave Chavez  Subject: RE: Referral Request    Dr. Raoul WEST wants you to come in and get a cortisone injection in that knee and then he will order pt for you. I have you scheduled for this Thursday at 1 to get the injection. If this does not work please call in our office to reschedule.    ThanksDeborah      ----- Message -----       From:Dave Chavez       Sent:1/5/2021  1:49 PM EST         To:Jeet Silveira MD    Subject:Referral Request    Good afternoon! I was contacted after my MRI with the results. The gentleman said he would call back to schedule physical therapy. Is that something we can go ahead and move forward with doing?  Thanks for your time.

## 2021-01-25 NOTE — PROGRESS NOTES
Patient: Dave Chavez  YOB: 1980 40 y.o. male  Medical Record Number: [id number]    Chief Complaints: Right knee pain    History of Present Illness:Dave Chavez is a 40 y.o. male who presents with complaints of increased right knee pain.  He recently saw Dr. Silveira and they recommended that he try cortisone injection, describes the knee pain as a moderate constant ache worse with walking better with rest    Allergies:   Allergies   Allergen Reactions   • Penicillins Other (See Comments)       Medications:   Current Outpatient Medications   Medication Sig Dispense Refill   • allopurinol (Zyloprim) 300 MG tablet Take 1.5 tablets by mouth Daily. 135 tablet 1   • ALPRAZolam (Xanax) 0.25 MG tablet Take 1 tablet by mouth 3 (Three) Times a Day As Needed for Anxiety. 30 tablet 3   • amphetamine-dextroamphetamine XR (Adderall XR) 10 MG 24 hr capsule Take 1 capsule by mouth 2 (Two) Times a Day 60 capsule 0   • colchicine 0.6 MG tablet TAKE 2 PO WITH GOUT FLARE AND CAN REPEAT IN 6 HOURS IF NEEDED.  CAN TAKE 1 PO DAILY AS NEEDED 30 tablet 5   • lisinopril (PRINIVIL,ZESTRIL) 10 MG tablet Take 1 tablet by mouth Daily. 90 tablet 3   • meloxicam (MOBIC) 15 MG tablet Take 1 tablet by mouth Daily As Needed for Moderate Pain . Take as directed with food. 30 tablet 2   • rosuvastatin (Crestor) 5 MG tablet Take 1 tablet by mouth Daily. 90 tablet 3     No current facility-administered medications for this visit.          The following portions of the patient's history were reviewed and updated as appropriate: allergies, current medications, past family history, past medical history, past social history, past surgical history and problem list.    Review of Systems:   A 14 point review of systems was performed. All systems negative except pertinent positives/negative listed in HPI above    Physical Exam:   Vitals:    01/26/21 1430   Temp: 97.5 °F (36.4 °C)   TempSrc: Temporal   Weight: 83.9 kg (185 lb)   Height: 180.3 cm  "(71\")   PainSc:   4       General: A and O x 3, ASA, NAD    SCLERA:    Normal    DENTITION:   Normal  Skin clear no unusual lesions noted  Right knee patient has no appreciable effusion 120 degrees flexion neutral extension with a positive Kusum negative Lockman calf soft and nontender    Radiology:  Xrays 3views (ap,lateral, sunrise) previous x-rays of the right knee were reviewed and were normal    Assessment/Plan:  Right knee pain    Patient I discussed options, he would like to proceed with right knee cortisone injection, he will continue with physical therapy exercises and we will see him back as needed    Large Joint Arthrocentesis: R knee  Date/Time: 1/26/2021 4:57 PM  Consent given by: patient  Site marked: site marked  Timeout: Immediately prior to procedure a time out was called to verify the correct patient, procedure, equipment, support staff and site/side marked as required   Supporting Documentation  Indications: pain   Procedure Details  Location: knee - R knee  Preparation: Patient was prepped and draped in the usual sterile fashion  Needle gauge: 21g.  Approach: lateral  Medications administered: 2 mL lidocaine (cardiac); 80 mg methylPREDNISolone acetate 80 MG/ML  Patient tolerance: patient tolerated the procedure well with no immediate complications       LETICIA Dietrich    "

## 2021-01-26 ENCOUNTER — CLINICAL SUPPORT (OUTPATIENT)
Dept: ORTHOPEDIC SURGERY | Facility: CLINIC | Age: 41
End: 2021-01-26

## 2021-01-26 VITALS — BODY MASS INDEX: 25.9 KG/M2 | TEMPERATURE: 97.5 F | WEIGHT: 185 LBS | HEIGHT: 71 IN

## 2021-01-26 DIAGNOSIS — M25.561 RIGHT KNEE PAIN, UNSPECIFIED CHRONICITY: Primary | ICD-10-CM

## 2021-01-26 DIAGNOSIS — F41.0 PANIC ATTACK AS REACTION TO STRESS: ICD-10-CM

## 2021-01-26 DIAGNOSIS — F43.0 PANIC ATTACK AS REACTION TO STRESS: ICD-10-CM

## 2021-01-26 PROCEDURE — 20610 DRAIN/INJ JOINT/BURSA W/O US: CPT | Performed by: NURSE PRACTITIONER

## 2021-01-26 PROCEDURE — 99212 OFFICE O/P EST SF 10 MIN: CPT | Performed by: NURSE PRACTITIONER

## 2021-01-26 RX ORDER — METHYLPREDNISOLONE ACETATE 80 MG/ML
80 INJECTION, SUSPENSION INTRA-ARTICULAR; INTRALESIONAL; INTRAMUSCULAR; SOFT TISSUE
Status: COMPLETED | OUTPATIENT
Start: 2021-01-26 | End: 2021-01-26

## 2021-01-26 RX ADMIN — METHYLPREDNISOLONE ACETATE 80 MG: 80 INJECTION, SUSPENSION INTRA-ARTICULAR; INTRALESIONAL; INTRAMUSCULAR; SOFT TISSUE at 16:57

## 2021-01-27 RX ORDER — ALPRAZOLAM 0.25 MG/1
0.25 TABLET ORAL 3 TIMES DAILY PRN
Qty: 30 TABLET | Refills: 3 | Status: SHIPPED | OUTPATIENT
Start: 2021-01-27 | End: 2021-02-26 | Stop reason: SDUPTHER

## 2021-01-27 RX ORDER — DEXTROAMPHETAMINE SACCHARATE, AMPHETAMINE ASPARTATE MONOHYDRATE, DEXTROAMPHETAMINE SULFATE AND AMPHETAMINE SULFATE 2.5; 2.5; 2.5; 2.5 MG/1; MG/1; MG/1; MG/1
10 CAPSULE, EXTENDED RELEASE ORAL 2 TIMES DAILY
Qty: 60 CAPSULE | Refills: 0 | Status: SHIPPED | OUTPATIENT
Start: 2021-01-27 | End: 2021-02-26 | Stop reason: SDUPTHER

## 2021-02-15 DIAGNOSIS — R73.9 ELEVATED BLOOD SUGAR: ICD-10-CM

## 2021-02-15 DIAGNOSIS — E78.49 OTHER HYPERLIPIDEMIA: ICD-10-CM

## 2021-02-15 DIAGNOSIS — M10.9 GOUT, UNSPECIFIED CAUSE, UNSPECIFIED CHRONICITY, UNSPECIFIED SITE: ICD-10-CM

## 2021-02-19 ENCOUNTER — TELEPHONE (OUTPATIENT)
Dept: ORTHOPEDIC SURGERY | Facility: CLINIC | Age: 41
End: 2021-02-19

## 2021-02-19 DIAGNOSIS — M25.561 RIGHT KNEE PAIN, UNSPECIFIED CHRONICITY: Primary | ICD-10-CM

## 2021-02-19 NOTE — TELEPHONE ENCOUNTER
I have spoken with the patient and apologize that unfortunately I was not the one that the previous messages were sent to and I had not seen them.  He was very understanding, I did place an order immediately for physical therapy and I have let him know that you will call him back.  If you could please look into what happened to those 2 previous messages.  Thank you

## 2021-02-19 NOTE — TELEPHONE ENCOUNTER
I called patient today 2/19/21 at 10:51 am to discuss.  Phone just rang no voicemail picked up. scd

## 2021-02-19 NOTE — TELEPHONE ENCOUNTER
----- Message from Dave Chavez sent at 2/19/2021  9:03 AM EST -----  Regarding: Referral Request  Contact: [phone]  I have sent 2 follow up messages since getting my injection and haven't received a SINGLE response. Attached was what I received in January. I got my injection and never got the referral. I will be contacting customer service at [facility] as this has been a ridiculous process with your office. Terrible service. Rude and condescending booking person as well. Highly disappointed with this whole experience.

## 2021-02-23 ENCOUNTER — HOSPITAL ENCOUNTER (OUTPATIENT)
Dept: PHYSICAL THERAPY | Facility: HOSPITAL | Age: 41
Setting detail: THERAPIES SERIES
Discharge: HOME | End: 2021-02-23

## 2021-02-23 DIAGNOSIS — M25.561 RIGHT KNEE PAIN, UNSPECIFIED CHRONICITY: Primary | ICD-10-CM

## 2021-02-23 PROCEDURE — 97110 THERAPEUTIC EXERCISES: CPT | Performed by: PHYSICAL THERAPIST

## 2021-02-23 PROCEDURE — 97161 PT EVAL LOW COMPLEX 20 MIN: CPT | Performed by: PHYSICAL THERAPIST

## 2021-02-23 NOTE — THERAPY EVALUATION
Outpatient Physical Therapy Ortho Initial Evaluation  MACKENZIE Naranjo     Patient Name: Dave Chavez  : 1980  MRN: [id number]  Today's Date: 2021      Visit Date: 2021    Patient Active Problem List   Diagnosis   • Attention deficit disorder (ADD) without hyperactivity   • BP (high blood pressure)   • Chronic fatigue   • Gout   • Bilateral knee pain   • Abnormal LFTs (liver function tests)   • Anxiety and depression   • Insomnia   • Other hyperlipidemia   • Panic attack as reaction to stress   • Right elbow pain        No past medical history on file.     Past Surgical History:   Procedure Laterality Date   • KIDNEY STONE SURGERY     • TONSILLECTOMY     • VASECTOMY         Visit Dx:     ICD-10-CM ICD-9-CM   1. Right knee pain, unspecified chronicity  M25.561 719.46         Patient History     Row Name 21 1355             History    Chief Complaint  Difficulty Walking;Difficulty with daily activities;Joint stiffness;Joint swelling;Pain  -      Type of Pain  Knee pain  -GC      Date Current Problem(s) Began  14  -      Brief Description of Current Complaint  Pt reports a long history of right knee pain that has worsened recentlyt to the point that it interferes with his ability to run and go up/down stairs. He received an injection several weeks ago that he says has helped some. He is currently taking Ibuprofen as needed. Recent x-rays and MRI were negative. he is now referred for therapy.  -      Patient/Caregiver Goals  Relieve pain;Return to prior level of function;Improve strength  -      Patient's Rating of General Health  Good  -      Hand Dominance  right-handed  -      Occupation/sports/leisure activities    -      What clinical tests have you had for this problem?  X-ray;MRI  -      Results of Clinical Tests  negative  -         Pain     Pain Location  Knee right  -GC      Pain at Present  1  -      Pain at Best  0  -      Pain at Worst  2  -GC       Pain Frequency  Intermittent  -GC      Pain Description  Aching;Discomfort;Tender;Sore  -GC      What Performance Factors Make the Current Problem(s) WORSE?  Pt c/o pain with running and going up/down stairs  -GC      Difficulties with ADL's?  Pt has difficulty going up/down stairs  -GC         Fall Risk Assessment    Any falls in the past year:  No  -GC         Services    Prior Rehab/Home Health Experiences  Yes  -GC      Are you currently receiving Home Health services  No  -GC      Do you plan to receive Home Health services in the near future  No  -GC         Daily Activities    Primary Language  English  -GC      How does patient learn best?  Listening;Demonstration  -GC      Teaching needs identified  Home Exercise Program;Management of Condition  -GC      Patient is concerned about/has problems with  Climbing Stairs;Performing job responsibilities/community activities (work, school,;Performing sports, recreation, and play activities  -GC      Does patient have problems with the following?  Anxiety  -GC      Barriers to learning  None  -GC      Functional Status  mobility issues preventing performance of daily activities  -GC      Pt Participated in POC and Goals  Yes  -GC         Safety    Are you being hurt, hit, or frightened by anyone at home or in your life?  No  -GC      Are you being neglected by a caregiver  No  -GC      Have you had any of the following issues with  Anxiety;Panic Attacks  -GC        User Key  (r) = Recorded By, (t) = Taken By, (c) = Cosigned By    Initials Name Provider Type    GC Chato Soler, ELIZA Physical Therapist          PT Ortho     Row Name 02/23/21 1655       Posture/Observations    Posture/Observations Comments  Pt has no edema noted. He does have a mild lateral glide of the patella at TKE  -GC       Knee Palpation    Patella  Right:;Tender  -GC       Patellar Accessory Motions    Superior glide  Right:;WNL  -GC    Inferior glide  Right:;WNL  -GC    Medial glide   Right:;WNL  -GC    Lateral glide  Right:;WNL  -GC       Knee Special Tests    Anterior drawer (ACL lesion)  Right:;Negative  -GC    Posterior drawer (PCL lesion)  Right:;Negative  -GC    Valgus stress (MCL lesion)  Right:;Negative  -GC    Varus stress (LCL lesion)  Right:;Negative  -GC    Kusum’s test (meniscal lesion)  Right:;Negative  -GC    Bounce home test (meniscal lesion)  Right:;Positive  -GC    Patellar grind test (chondromalacia patella)  Right:;Positive  -GC    Darnell’s sign (DVT)  Right:;Negative  -GC       Right Lower Ext    Rt Knee Extension/Flexion AROM  0-140 degrees  -GC       MMT Right Lower Ext    Rt Hip Flexion MMT, Gross Movement  (4+/5) good plus  -GC    Rt Hip Extension MMT, Gross Movement  (5/5) normal  -GC    Rt Hip ABduction MMT, Gross Movement  (5/5) normal  -GC    Rt Hip ADduction MMT, Gross Movement  (4/5) good  -GC    Rt Knee Extension MMT, Gross Movement  (4/5) good at TKE  -GC    Rt Knee Flexion MMT, Gross Movement  (4+/5) good plus  -GC       Sensation    Light Touch  No apparent deficits  -GC       Lower Extremity Flexibility    Hamstrings  Right:;Mildly limited  -GC    Hip Flexors  Right:;WNL  -GC    Quadriceps  Right:;WNL  -GC    ITB  Right:;WNL  -GC       Transfers    Comment (Transfers)  Pt is independent with all bed mobility and transfers  -       Gait/Stairs (Locomotion)    Comment (Gait/Stairs)  Pt ambulates normally on level surfaces  -      User Key  (r) = Recorded By, (t) = Taken By, (c) = Cosigned By    Initials Name Provider Type    GC Chato Soler, PT Physical Therapist                      Therapy Education  Given: HEP, Symptoms/condition management, Pain management  Program: New  How Provided: Verbal, Demonstration, Written  Provided to: Patient  Level of Understanding: Teach back education performed, Verbalized, Demonstrated     PT OP Goals     Row Name 02/23/21 1209          PT Short Term Goals    STG Date to Achieve  03/09/21  -     STG 1  Decrease right  knee pain to 1/10 with activity.   -     STG 2  Increase right hamstring flexibility to WFL with testing.  -     STG 3  Increase right LE strength to at least 4+/5 all planes with testing.  -     STG 4  Pt will be independent with his HEP issued by this therapist.  -        Long Term Goals    LTG Date to Achieve  03/23/21  -     LTG 1  Decrease right knee pain to 0/10 with activity.   -     LTG 2  Increase right LE strength to 5/5 all planes with testing.  -     LTG 3  Pt will be independent with all ADLs and have a LEFS score > 70.  -        Time Calculation    PT Goal Re-Cert Due Date  03/23/21  -       User Key  (r) = Recorded By, (t) = Taken By, (c) = Cosigned By    Initials Name Provider Type     Chato Soler, PT Physical Therapist          PT Assessment/Plan     Row Name 02/23/21 4996          PT Assessment    Functional Limitations  Performance in work activities;Limitations in functional capacity and performance;Limitations in community activities;Limitation in home management  -     Impairments  Impaired flexibility;Pain;Muscle strength  -     Assessment Comments  Pt presents with a long history of right knee pain that has gotten worse recently and is now causing him pain with stairs and running. He rates this pain up to 2/10 since a recent injection. He has decreased hamstring flexibility, decreased right LE strength, and decreased function secondary to the above.  -     Rehab Potential  Good  -     Patient/caregiver participated in establishment of treatment plan and goals  Yes  -     Patient would benefit from skilled therapy intervention  Yes  -        PT Plan    PT Frequency  1x/week;2x/week  -     Predicted Duration of Therapy Intervention (PT)  4 weeks  -     Planned CPT's?  PT EVAL LOW COMPLEXITY: 54283;PT THER PROC EA 15 MIN: 02681;PT HOT OR COLD PACK TREAT MCARE;PT ELECTRICAL STIM UNATTEND:   -     PT Plan Comments  Pt is to continue his HEP 2x daily.   -GC       User Key  (r) = Recorded By, (t) = Taken By, (c) = Cosigned By    Initials Name Provider Type    GC Chato Soler, PT Physical Therapist            OP Exercises     Row Name 02/23/21 1355             Exercise 1    Exercise Name 1  Hamstring stretch  -GC      Cueing 1  Verbal;Tactile  -GC      Reps 1  15  -GC      Time 1  10 secs  -GC         Exercise 2    Exercise Name 2  QS with Russian Stim  -GC      Cueing 2  Verbal;Tactile  -GC      Time 2  10 min 10/10  -GC         Exercise 3    Exercise Name 3  SLR  -GC      Cueing 3  Verbal;Tactile  -GC      Reps 3  30  -GC         Exercise 4    Exercise Name 4  Hip ADD  -GC      Cueing 4  Verbal;Tactile  -GC      Reps 4  30  -GC         Exercise 5    Exercise Name 5  SAQ  -GC      Cueing 5  Verbal;Tactile  -GC      Reps 5  50  -GC         Exercise 6    Exercise Name 6  LAQ/ball squeeze  -GC      Cueing 6  Verbal;Tactile  -GC      Reps 6  50  -GC         Exercise 7    Exercise Name 7  TKE vs theraband  -GC      Cueing 7  Verbal;Tactile  -GC      Reps 7  50  -GC      Time 7  gold  -GC        User Key  (r) = Recorded By, (t) = Taken By, (c) = Cosigned By    Initials Name Provider Type     Chato Soler, PT Physical Therapist                        Outcome Measure Options: Lower Extremity Functional Scale (LEFS)  Lower Extremity Functional Index  Any of your usual work, housework or school activities: A little bit of difficulty  Your usual hobbies, recreational or sporting activities: Moderate difficulty  Getting into or out of the bath: No difficulty  Walking between rooms: No difficulty  Putting on your shoes or socks: No difficulty  Squatting: Moderate difficulty  Lifting an object, like a bag of groceries from the floor: No difficulty  Performing light activities around your home: A little bit of difficulty  Performing heavy activities around your home: Moderate difficulty  Getting into or out of a car: No difficulty  Walking 2 blocks: No difficulty  Walking a  mile: A little bit of difficulty  Going up or down 10 stairs (about 1 flight of stairs): A little bit of difficulty  Standing for 1 hour: A little bit of difficulty  Sitting for 1 hour: No difficulty  Running on even ground: Moderate difficulty  Running on uneven ground: Quite a bit of difficulty  Making sharp turns while running fast: Quite a bit of difficulty  Hopping: A little bit of difficulty  Rolling over in bed: No difficulty  Total: 60      Time Calculation:     Start Time: 1355  Stop Time: 1448  Time Calculation (min): 53 min     Therapy Charges for Today     Code Description Service Date Service Provider Modifiers Qty    35868551871 HC PT EVAL LOW COMPLEXITY 2 2/23/2021 Chato Soler, PT GP 1    17265343420 HC PT THER PROC EA 15 MIN 2/23/2021 Chato Soler, PT GP 1          PT G-Codes  Outcome Measure Options: Lower Extremity Functional Scale (LEFS)  Total: 60         Chato Soler, PT  2/23/2021

## 2021-02-26 ENCOUNTER — OFFICE VISIT (OUTPATIENT)
Dept: FAMILY MEDICINE CLINIC | Facility: CLINIC | Age: 41
End: 2021-02-26

## 2021-02-26 VITALS
HEIGHT: 71 IN | DIASTOLIC BLOOD PRESSURE: 82 MMHG | SYSTOLIC BLOOD PRESSURE: 130 MMHG | RESPIRATION RATE: 16 BRPM | HEART RATE: 101 BPM | OXYGEN SATURATION: 100 % | BODY MASS INDEX: 26.63 KG/M2 | TEMPERATURE: 97.7 F | WEIGHT: 190.2 LBS

## 2021-02-26 DIAGNOSIS — R79.89 ABNORMAL LFTS (LIVER FUNCTION TESTS): ICD-10-CM

## 2021-02-26 DIAGNOSIS — I10 ESSENTIAL HYPERTENSION: ICD-10-CM

## 2021-02-26 DIAGNOSIS — F41.0 PANIC ATTACK AS REACTION TO STRESS: ICD-10-CM

## 2021-02-26 DIAGNOSIS — M10.9 GOUT, UNSPECIFIED CAUSE, UNSPECIFIED CHRONICITY, UNSPECIFIED SITE: ICD-10-CM

## 2021-02-26 DIAGNOSIS — G47.00 INSOMNIA, UNSPECIFIED TYPE: ICD-10-CM

## 2021-02-26 DIAGNOSIS — F43.0 PANIC ATTACK AS REACTION TO STRESS: ICD-10-CM

## 2021-02-26 DIAGNOSIS — F32.A ANXIETY AND DEPRESSION: ICD-10-CM

## 2021-02-26 DIAGNOSIS — F98.8 ATTENTION DEFICIT DISORDER (ADD) WITHOUT HYPERACTIVITY: ICD-10-CM

## 2021-02-26 DIAGNOSIS — E78.49 OTHER HYPERLIPIDEMIA: Primary | ICD-10-CM

## 2021-02-26 DIAGNOSIS — F41.9 ANXIETY AND DEPRESSION: ICD-10-CM

## 2021-02-26 PROCEDURE — 99214 OFFICE O/P EST MOD 30 MIN: CPT | Performed by: INTERNAL MEDICINE

## 2021-02-26 RX ORDER — ALPRAZOLAM 0.5 MG/1
0.5 TABLET ORAL 3 TIMES DAILY PRN
Qty: 30 TABLET | Refills: 5 | Status: SHIPPED | OUTPATIENT
Start: 2021-02-26 | End: 2021-07-23 | Stop reason: SDUPTHER

## 2021-02-26 RX ORDER — ALLOPURINOL 300 MG/1
300 TABLET ORAL DAILY
Qty: 90 TABLET | Refills: 3 | Status: SHIPPED | OUTPATIENT
Start: 2021-02-26 | End: 2021-12-13 | Stop reason: SDUPTHER

## 2021-02-26 RX ORDER — DEXTROAMPHETAMINE SACCHARATE, AMPHETAMINE ASPARTATE MONOHYDRATE, DEXTROAMPHETAMINE SULFATE AND AMPHETAMINE SULFATE 2.5; 2.5; 2.5; 2.5 MG/1; MG/1; MG/1; MG/1
10 CAPSULE, EXTENDED RELEASE ORAL 2 TIMES DAILY
Qty: 60 CAPSULE | Refills: 0 | Status: SHIPPED | OUTPATIENT
Start: 2021-02-26 | End: 2021-03-30 | Stop reason: SDUPTHER

## 2021-02-26 NOTE — PROGRESS NOTES
Subjective   Dave Chavez is a 40 y.o. male. Patient is here today for follow-up on his hypertension, hyperlipidemia, history of elevated liver tests, anxiety and panic attacks, ADD, insomnia and gout.  He has been doing well and has not had any recent gout attacks.  His anxiety varies depending on the day and he does have some insomnia that is helped with the alprazolam.  Generally he needs to take a second alprazolam and would like to increase the dose but not change the frequency.  His ADD is fine on the current medication  Chief Complaint   Patient presents with   • Hyperlipidemia     HTN, GOUT- FOLLOW UP LABS          Vitals:    02/26/21 1420   BP: 130/82   Pulse: 101   Resp: 16   Temp: 97.7 °F (36.5 °C)   SpO2: 100%     Body mass index is 26.54 kg/m².  The following portions of the patient's history were reviewed and updated as appropriate: allergies, current medications, past family history, past medical history, past social history, past surgical history and problem list.    History reviewed. No pertinent past medical history.   Allergies   Allergen Reactions   • Penicillins Unknown - Low Severity     childhood      Social History     Socioeconomic History   • Marital status:      Spouse name: Not on file   • Number of children: Not on file   • Years of education: Not on file   • Highest education level: Not on file   Tobacco Use   • Smoking status: Former Smoker     Types: Cigarettes   • Smokeless tobacco: Never Used   • Tobacco comment:  1/2 a pack from age 21 to 35   Substance and Sexual Activity   • Alcohol use: Yes     Frequency: 4 or more times a week   • Drug use: No   • Sexual activity: Defer        Current Outpatient Medications:   •  allopurinol (Zyloprim) 300 MG tablet, Take 1 tablet by mouth Daily., Disp: 90 tablet, Rfl: 3  •  ALPRAZolam (XANAX) 0.5 MG tablet, Take 1 tablet by mouth 3 (Three) Times a Day As Needed for Anxiety., Disp: 30 tablet, Rfl: 5  •  amphetamine-dextroamphetamine XR  (Adderall XR) 10 MG 24 hr capsule, Take 1 capsule by mouth 2 (Two) Times a Day, Disp: 60 capsule, Rfl: 0  •  colchicine 0.6 MG tablet, TAKE 2 PO WITH GOUT FLARE AND CAN REPEAT IN 6 HOURS IF NEEDED.  CAN TAKE 1 PO DAILY AS NEEDED, Disp: 30 tablet, Rfl: 5  •  lisinopril (PRINIVIL,ZESTRIL) 10 MG tablet, Take 1 tablet by mouth Daily., Disp: 90 tablet, Rfl: 3  •  meloxicam (MOBIC) 15 MG tablet, Take 1 tablet by mouth Daily As Needed for Moderate Pain . Take as directed with food., Disp: 30 tablet, Rfl: 2  •  rosuvastatin (Crestor) 5 MG tablet, Take 1 tablet by mouth Daily., Disp: 90 tablet, Rfl: 3     Objective     History of Present Illness     Review of Systems   Constitutional: Negative.    HENT: Negative.    Eyes: Negative.    Respiratory: Negative.    Cardiovascular: Negative.    Gastrointestinal: Negative.    Genitourinary: Negative.    Musculoskeletal: Negative.    Skin: Negative.    Neurological: Negative.    Hematological: Negative.    Psychiatric/Behavioral: Negative.        Physical Exam  Vitals signs (122/80) and nursing note reviewed.   Constitutional:       General: He is not in acute distress.     Appearance: Normal appearance. He is not ill-appearing.   HENT:      Head: Normocephalic and atraumatic.   Eyes:      General: No scleral icterus.     Conjunctiva/sclera: Conjunctivae normal.   Neck:      Musculoskeletal: Normal range of motion and neck supple.   Cardiovascular:      Rate and Rhythm: Normal rate and regular rhythm.      Heart sounds: Normal heart sounds.   Pulmonary:      Effort: Pulmonary effort is normal. No respiratory distress.      Breath sounds: Normal breath sounds. No wheezing or rales.   Musculoskeletal: Normal range of motion.      Right lower leg: No edema.      Left lower leg: No edema.   Skin:     General: Skin is warm and dry.   Neurological:      General: No focal deficit present.      Mental Status: He is alert and oriented to person, place, and time.   Psychiatric:         Mood  and Affect: Mood normal.         Behavior: Behavior normal.         ASSESSMENT CBC is normal.  CMP has a stable sugar of 110 and AST of 42 and ALT of 51 that are stable.  Hemoglobin A1c is normal at 5.5.  Lipid panel has a total cholesterol of 189, HDL 73 and LDL 78.  TSH is quite normal.  Uric acid level is quite low at 2.6.  #1-hypertension controlled  #2-hyperlipidemia, well controlled  #3-mild elevation of liver tests, asymptomatic and stable  #4-anxiety and panic attacks, generally controlled on alprazolam  #5-ADD, stable on Adderall XR  #6-gout, well controlled on allopurinol     Problems Addressed this Visit        Cardiac and Vasculature    BP (high blood pressure)    Other hyperlipidemia - Primary       Gastrointestinal Abdominal     Abnormal LFTs (liver function tests)       Mental Health    Attention deficit disorder (ADD) without hyperactivity    Relevant Medications    amphetamine-dextroamphetamine XR (Adderall XR) 10 MG 24 hr capsule    ALPRAZolam (XANAX) 0.5 MG tablet    Anxiety and depression    Relevant Medications    amphetamine-dextroamphetamine XR (Adderall XR) 10 MG 24 hr capsule    ALPRAZolam (XANAX) 0.5 MG tablet    Panic attack as reaction to stress    Relevant Medications    amphetamine-dextroamphetamine XR (Adderall XR) 10 MG 24 hr capsule    ALPRAZolam (XANAX) 0.5 MG tablet       Musculoskeletal and Injuries    Gout       Sleep    Insomnia      Diagnoses       Codes Comments    Other hyperlipidemia    -  Primary ICD-10-CM: E78.49  ICD-9-CM: 272.4     Essential hypertension     ICD-10-CM: I10  ICD-9-CM: 401.9     Abnormal LFTs (liver function tests)     ICD-10-CM: R94.5  ICD-9-CM: 790.6     Panic attack as reaction to stress     ICD-10-CM: F41.0, F43.0  ICD-9-CM: 308.0     Attention deficit disorder (ADD) without hyperactivity     ICD-10-CM: F98.8  ICD-9-CM: 314.00     Anxiety and depression     ICD-10-CM: F41.9, F32.9  ICD-9-CM: 300.00, 311     Gout, unspecified cause, unspecified  chronicity, unspecified site     ICD-10-CM: M10.9  ICD-9-CM: 274.9     Insomnia, unspecified type     ICD-10-CM: G47.00  ICD-9-CM: 780.52           PLAN I am going to decrease the patient's allopurinol to 300 mg daily and increase his alprazolam to 0.5 mg and he can cut that in half as needed.  He will continue other medicines as now and I plan on rechecking him in 6 months with a CMP, lipid panel, hemoglobin A1c, uric acid level    There are no Patient Instructions on file for this visit.  Return in about 6 months (around 8/26/2021) for with labs.

## 2021-03-30 DIAGNOSIS — F43.0 PANIC ATTACK AS REACTION TO STRESS: ICD-10-CM

## 2021-03-30 DIAGNOSIS — F41.0 PANIC ATTACK AS REACTION TO STRESS: ICD-10-CM

## 2021-03-31 RX ORDER — DEXTROAMPHETAMINE SACCHARATE, AMPHETAMINE ASPARTATE MONOHYDRATE, DEXTROAMPHETAMINE SULFATE AND AMPHETAMINE SULFATE 2.5; 2.5; 2.5; 2.5 MG/1; MG/1; MG/1; MG/1
10 CAPSULE, EXTENDED RELEASE ORAL 2 TIMES DAILY
Qty: 60 CAPSULE | Refills: 0 | Status: SHIPPED | OUTPATIENT
Start: 2021-03-31 | End: 2021-05-13 | Stop reason: SDUPTHER

## 2021-03-31 NOTE — TELEPHONE ENCOUNTER
amphetamine-dextroamphetamine XR (Adderall XR) 10 MG 24 hr capsule         Sig: Take 1 capsule by mouth 2 (Two) Times a Day   Last seen 2/26/21 upcoming 8/31/21

## 2021-04-30 ENCOUNTER — TELEPHONE (OUTPATIENT)
Dept: FAMILY MEDICINE CLINIC | Facility: CLINIC | Age: 41
End: 2021-04-30

## 2021-04-30 NOTE — TELEPHONE ENCOUNTER
HOWIE WITH  [facility]   CALLED IN STATING NEEDS UPDATED REF. TO CONT. PT [facility]ER[facility] SHOT.  HOWIE ADVISED NEEDS  REF TO BE DATED STARTING 3/19/21.      IF YOU HAVE ANY QUESTIONS PLEASE CONTACT HOWIE [phone]

## 2021-05-13 DIAGNOSIS — F43.0 PANIC ATTACK AS REACTION TO STRESS: ICD-10-CM

## 2021-05-13 DIAGNOSIS — F41.0 PANIC ATTACK AS REACTION TO STRESS: ICD-10-CM

## 2021-05-13 RX ORDER — DEXTROAMPHETAMINE SACCHARATE, AMPHETAMINE ASPARTATE MONOHYDRATE, DEXTROAMPHETAMINE SULFATE AND AMPHETAMINE SULFATE 2.5; 2.5; 2.5; 2.5 MG/1; MG/1; MG/1; MG/1
10 CAPSULE, EXTENDED RELEASE ORAL 2 TIMES DAILY
Qty: 60 CAPSULE | Refills: 0 | Status: SHIPPED | OUTPATIENT
Start: 2021-05-13 | End: 2021-07-23 | Stop reason: SDUPTHER

## 2021-06-22 ENCOUNTER — OFFICE VISIT (OUTPATIENT)
Dept: FAMILY MEDICINE CLINIC | Facility: CLINIC | Age: 41
End: 2021-06-22

## 2021-06-22 VITALS
RESPIRATION RATE: 18 BRPM | SYSTOLIC BLOOD PRESSURE: 120 MMHG | HEIGHT: 71 IN | WEIGHT: 191.4 LBS | BODY MASS INDEX: 26.8 KG/M2 | HEART RATE: 99 BPM | DIASTOLIC BLOOD PRESSURE: 80 MMHG | TEMPERATURE: 98.2 F | OXYGEN SATURATION: 98 %

## 2021-06-22 DIAGNOSIS — I10 ESSENTIAL HYPERTENSION: Primary | ICD-10-CM

## 2021-06-22 DIAGNOSIS — M25.561 CHRONIC PAIN OF BOTH KNEES: ICD-10-CM

## 2021-06-22 DIAGNOSIS — G89.29 CHRONIC PAIN OF BOTH KNEES: ICD-10-CM

## 2021-06-22 DIAGNOSIS — M25.562 CHRONIC PAIN OF BOTH KNEES: ICD-10-CM

## 2021-06-22 PROCEDURE — 99213 OFFICE O/P EST LOW 20 MIN: CPT | Performed by: INTERNAL MEDICINE

## 2021-06-22 NOTE — PROGRESS NOTES
Subjective  Answers for HPI/ROS submitted by the patient on 6/21/2021  What is the primary reason for your visit?: Other  Please describe your symptoms.: Minor symptom of a cough. Primary reason for request is to complete a medical history form for the [employer].  Have you had these symptoms before?: Yes  How long have you been having these symptoms?: Greater than 2 weeks      Dave Chavez is a 40 y.o. male. Patient is here today for a medical form detailing limitations on physical exam is required to complete for the [employer].  He has hypertension that is well controlled and he does have some bilateral knee pain and elbow pain, that I do not want him having to do certain exercises with.  Generally he has been feeling well  Chief Complaint   Patient presents with   • Hypertension     ADD, ANXIETY- PT NEEDS PAPERWORK FILLED OUT          Vitals:    06/22/21 1051   BP: 120/80   Pulse: 99   Resp: 18   Temp: 98.2 °F (36.8 °C)   SpO2: 98%     Body mass index is 26.71 kg/m².  The following portions of the patient's history were reviewed and updated as appropriate: allergies, current medications, past family history, past medical history, past social history, past surgical history and problem list.    Past Medical History:   Diagnosis Date   • ADHD (attention deficit hyperactivity disorder) 7/25/2018    Treated with straterra but medication made me feel nauseous   • Allergic 01/01/1991    Currently taking allergy shots 1x/wk for seasonal allergies   • Anxiety 02/01/2018    Treated with paxil and resolved around 5/1/2018   • Arthritis 01/01/2014    Noted in left knee and right large toe   • Depression 02/01/2018    Treated with paxil and resolved around 5/1/2018   • Hypertension 01/01/2015    Never treated with medication   • Irritable bowel syndrome 02/01/2018    Prescribed Bentyl   • Kidney stone 01/01/2001    Hx of kidney stones.  Treated at [facility].      Allergies   Allergen Reactions   • Penicillins Unknown - Low Severity      childhood      Social History     Socioeconomic History   • Marital status:      Spouse name: Not on file   • Number of children: Not on file   • Years of education: Not on file   • Highest education level: Not on file   Tobacco Use   • Smoking status: Former Smoker     Packs/day: 0.50     Years: 10.00     Pack years: 5.00     Types: Cigarettes     Start date: 2002     Quit date: 2016     Years since quittin.9   • Smokeless tobacco: Never Used   • Tobacco comment: Quit using lozenges and the Quit-Now phone line   Substance and Sexual Activity   • Alcohol use: Yes     Alcohol/week: 9.0 standard drinks     Types: 3 Glasses of wine, 6 Cans of beer per week   • Drug use: No   • Sexual activity: Yes     Partners: Female     Birth control/protection: Surgical     Comment: Vasectomy        Current Outpatient Medications:   •  allopurinol (Zyloprim) 300 MG tablet, Take 1 tablet by mouth Daily., Disp: 90 tablet, Rfl: 3  •  ALPRAZolam (XANAX) 0.5 MG tablet, Take 1 tablet by mouth 3 (Three) Times a Day As Needed for Anxiety., Disp: 30 tablet, Rfl: 5  •  amphetamine-dextroamphetamine XR (Adderall XR) 10 MG 24 hr capsule, Take 1 capsule by mouth 2 (Two) Times a Day, Disp: 60 capsule, Rfl: 0  •  colchicine 0.6 MG tablet, TAKE 2 PO WITH GOUT FLARE AND CAN REPEAT IN 6 HOURS IF NEEDED.  CAN TAKE 1 PO DAILY AS NEEDED, Disp: 30 tablet, Rfl: 5  •  lisinopril (PRINIVIL,ZESTRIL) 10 MG tablet, Take 1 tablet by mouth Daily., Disp: 90 tablet, Rfl: 3  •  meloxicam (MOBIC) 15 MG tablet, Take 1 tablet by mouth Daily As Needed for Moderate Pain . Take as directed with food., Disp: 30 tablet, Rfl: 2  •  rosuvastatin (Crestor) 5 MG tablet, Take 1 tablet by mouth Daily., Disp: 90 tablet, Rfl: 3     Objective     History of Present Illness     Review of Systems   Constitutional: Negative.    HENT: Negative.    Respiratory: Negative.    Cardiovascular: Negative.    Gastrointestinal: Negative.    Genitourinary: Negative.     Musculoskeletal: Positive for arthralgias.   Skin: Negative.    Neurological: Negative.    Hematological: Negative.    Psychiatric/Behavioral: Negative.        Physical Exam  Vitals (120/70) and nursing note reviewed.   Constitutional:       Appearance: Normal appearance.   Eyes:      General: No scleral icterus.     Conjunctiva/sclera: Conjunctivae normal.   Cardiovascular:      Rate and Rhythm: Normal rate and regular rhythm.      Heart sounds: Normal heart sounds.   Pulmonary:      Effort: Pulmonary effort is normal. No respiratory distress.      Breath sounds: Normal breath sounds. No wheezing or rales.   Musculoskeletal:         General: Normal range of motion.      Cervical back: Normal range of motion and neck supple.   Skin:     General: Skin is warm and dry.   Neurological:      General: No focal deficit present.      Mental Status: He is alert and oriented to person, place, and time.   Psychiatric:         Mood and Affect: Mood normal.         Behavior: Behavior normal.         ASSESSMENT #1-hypertension controlled  #2-arthritic complaints in the knees and elbow, limiting what he can do on physical exam     Problems Addressed this Visit        Cardiac and Vasculature    BP (high blood pressure) - Primary       Musculoskeletal and Injuries    Bilateral knee pain      Diagnoses       Codes Comments    Essential hypertension    -  Primary ICD-10-CM: I10  ICD-9-CM: 401.9     Chronic pain of both knees     ICD-10-CM: M25.561, M25.562, G89.29  ICD-9-CM: 719.46, 338.29           PLAN I completed the form that the patient needs for the .  He is already scheduled for follow-up with me in August and will keep that appointment.    There are no Patient Instructions on file for this visit.  No follow-ups on file.

## 2021-07-23 DIAGNOSIS — F43.0 PANIC ATTACK AS REACTION TO STRESS: ICD-10-CM

## 2021-07-23 DIAGNOSIS — F41.0 PANIC ATTACK AS REACTION TO STRESS: ICD-10-CM

## 2021-07-23 RX ORDER — DEXTROAMPHETAMINE SACCHARATE, AMPHETAMINE ASPARTATE MONOHYDRATE, DEXTROAMPHETAMINE SULFATE AND AMPHETAMINE SULFATE 2.5; 2.5; 2.5; 2.5 MG/1; MG/1; MG/1; MG/1
10 CAPSULE, EXTENDED RELEASE ORAL 2 TIMES DAILY
Qty: 60 CAPSULE | Refills: 0 | Status: SHIPPED | OUTPATIENT
Start: 2021-07-23 | End: 2021-10-03 | Stop reason: SDUPTHER

## 2021-07-23 RX ORDER — ALPRAZOLAM 0.5 MG/1
0.5 TABLET ORAL 3 TIMES DAILY PRN
Qty: 30 TABLET | Refills: 5 | Status: SHIPPED | OUTPATIENT
Start: 2021-07-23 | End: 2021-12-05 | Stop reason: SDUPTHER

## 2021-08-20 DIAGNOSIS — M10.9 GOUT, UNSPECIFIED CAUSE, UNSPECIFIED CHRONICITY, UNSPECIFIED SITE: ICD-10-CM

## 2021-08-20 DIAGNOSIS — E78.49 OTHER HYPERLIPIDEMIA: ICD-10-CM

## 2021-08-20 DIAGNOSIS — R73.9 ELEVATED BLOOD SUGAR: ICD-10-CM

## 2021-10-03 DIAGNOSIS — F41.0 PANIC ATTACK AS REACTION TO STRESS: ICD-10-CM

## 2021-10-03 DIAGNOSIS — F43.0 PANIC ATTACK AS REACTION TO STRESS: ICD-10-CM

## 2021-10-04 NOTE — TELEPHONE ENCOUNTER
Rx Refill Note  Requested Prescriptions     Pending Prescriptions Disp Refills   • amphetamine-dextroamphetamine XR (Adderall XR) 10 MG 24 hr capsule 60 capsule 0     Sig: Take 1 capsule by mouth 2 (Two) Times a Day      Last office visit with prescribing clinician: 6/22/2021      Next office visit with prescribing clinician: 11/10/2021            Kandice Dee MA  10/04/21, 09:09 EDT

## 2021-10-05 RX ORDER — DEXTROAMPHETAMINE SACCHARATE, AMPHETAMINE ASPARTATE MONOHYDRATE, DEXTROAMPHETAMINE SULFATE AND AMPHETAMINE SULFATE 2.5; 2.5; 2.5; 2.5 MG/1; MG/1; MG/1; MG/1
10 CAPSULE, EXTENDED RELEASE ORAL 2 TIMES DAILY
Qty: 60 CAPSULE | Refills: 0 | Status: SHIPPED | OUTPATIENT
Start: 2021-10-05 | End: 2021-12-05 | Stop reason: SDUPTHER

## 2021-11-05 LAB
ALBUMIN SERPL-MCNC: 4.5 G/DL (ref 4–5)
ALBUMIN/GLOB SERPL: 2 {RATIO} (ref 1.2–2.2)
ALP SERPL-CCNC: 127 IU/L (ref 44–121)
ALT SERPL-CCNC: 78 IU/L (ref 0–44)
AST SERPL-CCNC: 61 IU/L (ref 0–40)
BILIRUB SERPL-MCNC: 0.4 MG/DL (ref 0–1.2)
BUN SERPL-MCNC: 9 MG/DL (ref 6–24)
BUN/CREAT SERPL: 8 (ref 9–20)
CALCIUM SERPL-MCNC: 9 MG/DL (ref 8.7–10.2)
CHLORIDE SERPL-SCNC: 105 MMOL/L (ref 96–106)
CHOLEST SERPL-MCNC: 175 MG/DL (ref 100–199)
CO2 SERPL-SCNC: 25 MMOL/L (ref 20–29)
CREAT SERPL-MCNC: 1.08 MG/DL (ref 0.76–1.27)
GLOBULIN SER CALC-MCNC: 2.3 G/DL (ref 1.5–4.5)
GLUCOSE SERPL-MCNC: 97 MG/DL (ref 65–99)
HBA1C MFR BLD: 5.5 % (ref 4.8–5.6)
HDLC SERPL-MCNC: 66 MG/DL
LDLC SERPL CALC-MCNC: 75 MG/DL (ref 0–99)
LDLC/HDLC SERPL: 1.1 RATIO (ref 0–3.6)
POTASSIUM SERPL-SCNC: 4.8 MMOL/L (ref 3.5–5.2)
PROT SERPL-MCNC: 6.8 G/DL (ref 6–8.5)
SODIUM SERPL-SCNC: 143 MMOL/L (ref 134–144)
TRIGL SERPL-MCNC: 211 MG/DL (ref 0–149)
URATE SERPL-MCNC: 5.5 MG/DL (ref 3.8–8.4)
VLDLC SERPL CALC-MCNC: 34 MG/DL (ref 5–40)

## 2021-11-10 ENCOUNTER — OFFICE VISIT (OUTPATIENT)
Dept: FAMILY MEDICINE CLINIC | Facility: CLINIC | Age: 41
End: 2021-11-10

## 2021-11-10 VITALS
WEIGHT: 189.2 LBS | OXYGEN SATURATION: 99 % | SYSTOLIC BLOOD PRESSURE: 124 MMHG | HEART RATE: 100 BPM | HEIGHT: 71 IN | TEMPERATURE: 96.8 F | RESPIRATION RATE: 18 BRPM | DIASTOLIC BLOOD PRESSURE: 80 MMHG | BODY MASS INDEX: 26.49 KG/M2

## 2021-11-10 DIAGNOSIS — F41.0 PANIC ATTACK AS REACTION TO STRESS: ICD-10-CM

## 2021-11-10 DIAGNOSIS — F98.8 ATTENTION DEFICIT DISORDER (ADD) WITHOUT HYPERACTIVITY: ICD-10-CM

## 2021-11-10 DIAGNOSIS — F43.0 PANIC ATTACK AS REACTION TO STRESS: ICD-10-CM

## 2021-11-10 DIAGNOSIS — F32.A ANXIETY AND DEPRESSION: ICD-10-CM

## 2021-11-10 DIAGNOSIS — M10.9 GOUT, UNSPECIFIED CAUSE, UNSPECIFIED CHRONICITY, UNSPECIFIED SITE: ICD-10-CM

## 2021-11-10 DIAGNOSIS — R79.89 ABNORMAL LFTS (LIVER FUNCTION TESTS): ICD-10-CM

## 2021-11-10 DIAGNOSIS — J30.89 ENVIRONMENTAL AND SEASONAL ALLERGIES: ICD-10-CM

## 2021-11-10 DIAGNOSIS — G47.00 INSOMNIA, UNSPECIFIED TYPE: ICD-10-CM

## 2021-11-10 DIAGNOSIS — E78.49 OTHER HYPERLIPIDEMIA: Primary | ICD-10-CM

## 2021-11-10 DIAGNOSIS — F41.9 ANXIETY AND DEPRESSION: ICD-10-CM

## 2021-11-10 DIAGNOSIS — I10 PRIMARY HYPERTENSION: ICD-10-CM

## 2021-11-10 PROCEDURE — 99214 OFFICE O/P EST MOD 30 MIN: CPT | Performed by: INTERNAL MEDICINE

## 2021-11-10 RX ORDER — ROSUVASTATIN CALCIUM 5 MG/1
5 TABLET, COATED ORAL DAILY
Qty: 90 TABLET | Refills: 3 | Status: SHIPPED | OUTPATIENT
Start: 2021-11-10 | End: 2021-12-13 | Stop reason: SDUPTHER

## 2021-11-10 RX ORDER — LISINOPRIL 10 MG/1
10 TABLET ORAL DAILY
Qty: 90 TABLET | Refills: 3 | Status: SHIPPED | OUTPATIENT
Start: 2021-11-10 | End: 2021-12-13 | Stop reason: SDUPTHER

## 2021-11-10 NOTE — PROGRESS NOTES
Subjective   Dave Chavez is a 41 y.o. male. Patient is here today for follow-up on his environmental and seasonal allergies, hyperlipidemia, hypertension, elevated liver tests, panic attack and some anxiety and depression and ADD.  Patient's generally been stable and feels okay.  He has had no gout attacks.  He does have some mild general decreased energy of uncertain cause.  Chief Complaint   Patient presents with   • Hyperlipidemia     FOLLOW UP LABS          Vitals:    11/10/21 1352   BP: 124/80   Pulse: 100   Resp: 18   Temp: 96.8 °F (36 °C)   SpO2: 99%     Body mass index is 26.4 kg/m².  The following portions of the patient's history were reviewed and updated as appropriate: allergies, current medications, past family history, past medical history, past social history, past surgical history and problem list.    Past Medical History:   Diagnosis Date   • ADHD (attention deficit hyperactivity disorder) 2018    Treated with straterra but medication made me feel nauseous   • Allergic 1991    Currently taking allergy shots 1x/wk for seasonal allergies   • Anxiety 2018    Treated with paxil and resolved around 2018   • Arthritis 2014    Noted in left knee and right large toe   • Depression 2018    Treated with paxil and resolved around 2018   • Hypertension 2015    Never treated with medication   • Irritable bowel syndrome 2018    Prescribed Bentyl   • Kidney stone 2001    Hx of kidney stones.  Treated at [facility].      Allergies   Allergen Reactions   • Penicillins Unknown - Low Severity     childhood      Social History     Socioeconomic History   • Marital status:    Tobacco Use   • Smoking status: Former Smoker     Packs/day: 0.50     Years: 10.00     Pack years: 5.00     Types: Cigarettes     Start date: 2002     Quit date: 2016     Years since quittin.3   • Smokeless tobacco: Never Used   • Tobacco comment: Quit using lozenges and  the 1-800-Quit-Now phone line   Substance and Sexual Activity   • Alcohol use: Yes     Alcohol/week: 9.0 standard drinks     Types: 3 Glasses of wine, 6 Cans of beer per week   • Drug use: No   • Sexual activity: Yes     Partners: Female     Birth control/protection: Surgical     Comment: Vasectomy        Current Outpatient Medications:   •  allopurinol (Zyloprim) 300 MG tablet, Take 1 tablet by mouth Daily., Disp: 90 tablet, Rfl: 3  •  ALPRAZolam (XANAX) 0.5 MG tablet, Take 1 tablet by mouth 3 (Three) Times a Day As Needed for Anxiety., Disp: 30 tablet, Rfl: 5  •  amphetamine-dextroamphetamine XR (Adderall XR) 10 MG 24 hr capsule, Take 1 capsule by mouth 2 (Two) Times a Day, Disp: 60 capsule, Rfl: 0  •  colchicine 0.6 MG tablet, TAKE 2 PO WITH GOUT FLARE AND CAN REPEAT IN 6 HOURS IF NEEDED.  CAN TAKE 1 PO DAILY AS NEEDED, Disp: 30 tablet, Rfl: 5  •  lisinopril (PRINIVIL,ZESTRIL) 10 MG tablet, Take 1 tablet by mouth Daily., Disp: 90 tablet, Rfl: 3  •  rosuvastatin (Crestor) 5 MG tablet, Take 1 tablet by mouth Daily., Disp: 90 tablet, Rfl: 3     Objective     History of Present Illness     Review of Systems   Constitutional: Negative.    HENT: Negative.    Respiratory: Negative.    Cardiovascular: Negative.    Gastrointestinal: Negative.    Genitourinary: Negative.    Musculoskeletal: Negative.    Skin: Negative.    Allergic/Immunologic: Positive for environmental allergies.   Neurological: Negative.    Hematological: Negative.    Psychiatric/Behavioral: Negative.        Physical Exam  Vitals and nursing note reviewed.   Constitutional:       General: He is not in acute distress.     Appearance: Normal appearance. He is not ill-appearing.   HENT:      Head: Normocephalic and atraumatic.   Cardiovascular:      Rate and Rhythm: Normal rate and regular rhythm.      Heart sounds: Normal heart sounds.   Pulmonary:      Effort: Pulmonary effort is normal. No respiratory distress.      Breath sounds: Normal breath sounds.  No wheezing or rales.   Musculoskeletal:         General: Normal range of motion.      Cervical back: Normal range of motion and neck supple.   Skin:     General: Skin is warm and dry.   Neurological:      General: No focal deficit present.      Mental Status: He is alert and oriented to person, place, and time.   Psychiatric:         Mood and Affect: Mood normal.         Behavior: Behavior normal.         ASSESSMENT CMP had a normal sugar and an alkaline phosphatase 127, AST 61 ALT 78 that are stable.  Lipid panel had total cholesterol 175, HDL 66, LDL 75.  Hemoglobin A1c was normal at 5.5 and uric acid levels controlled at 5.5.  #1-environmental and seasonal allergies, stable on over-the-counter medications  #2-gout, controlled on allopurinol  #3-anxiety and depression, stable on alprazolam  #4-hypertension controlled on medication  #5-hyperlipidemia controlled on medication  #6-elevated liver tests, asymptomatic and stable     Problems Addressed this Visit        Allergies and Adverse Reactions    Environmental and seasonal allergies       Cardiac and Vasculature    BP (high blood pressure)    Relevant Medications    lisinopril (PRINIVIL,ZESTRIL) 10 MG tablet    Other hyperlipidemia - Primary    Relevant Medications    rosuvastatin (Crestor) 5 MG tablet       Gastrointestinal Abdominal     Abnormal LFTs (liver function tests)       Mental Health    Attention deficit disorder (ADD) without hyperactivity    Anxiety and depression    Panic attack as reaction to stress       Musculoskeletal and Injuries    Gout       Sleep    Insomnia      Diagnoses       Codes Comments    Other hyperlipidemia    -  Primary ICD-10-CM: E78.49  ICD-9-CM: 272.4     Primary hypertension     ICD-10-CM: I10  ICD-9-CM: 401.9     Abnormal LFTs (liver function tests)     ICD-10-CM: R94.5  ICD-9-CM: 790.6     Panic attack as reaction to stress     ICD-10-CM: F41.0, F43.0  ICD-9-CM: 308.0     Attention deficit disorder (ADD) without  hyperactivity     ICD-10-CM: F98.8  ICD-9-CM: 314.00     Insomnia, unspecified type     ICD-10-CM: G47.00  ICD-9-CM: 780.52     Gout, unspecified cause, unspecified chronicity, unspecified site     ICD-10-CM: M10.9  ICD-9-CM: 274.9     Anxiety and depression     ICD-10-CM: F41.9, F32.A  ICD-9-CM: 300.00, 311     Environmental and seasonal allergies     ICD-10-CM: J30.89  ICD-9-CM: 477.8           PLAN the patient will continue current medicines as now.  I plan on rechecking him in 6 months for complete physical exam including a CBC, CMP, lipid panel, hemoglobin A1c, TSH and free T4, uric acid level and testosterone total and free because of fatigue and decreased libido and urinalysis    There are no Patient Instructions on file for this visit.  Return in about 6 months (around 5/10/2022) for with labs, Annual physical.

## 2021-12-05 DIAGNOSIS — F43.0 PANIC ATTACK AS REACTION TO STRESS: ICD-10-CM

## 2021-12-05 DIAGNOSIS — F41.0 PANIC ATTACK AS REACTION TO STRESS: ICD-10-CM

## 2021-12-07 RX ORDER — ALPRAZOLAM 0.5 MG/1
0.5 TABLET ORAL 3 TIMES DAILY PRN
Qty: 30 TABLET | Refills: 2 | Status: SHIPPED | OUTPATIENT
Start: 2021-12-07 | End: 2021-12-15 | Stop reason: SDUPTHER

## 2021-12-07 RX ORDER — DEXTROAMPHETAMINE SACCHARATE, AMPHETAMINE ASPARTATE MONOHYDRATE, DEXTROAMPHETAMINE SULFATE AND AMPHETAMINE SULFATE 2.5; 2.5; 2.5; 2.5 MG/1; MG/1; MG/1; MG/1
10 CAPSULE, EXTENDED RELEASE ORAL 2 TIMES DAILY
Qty: 60 CAPSULE | Refills: 0 | Status: SHIPPED | OUTPATIENT
Start: 2021-12-07 | End: 2021-12-15 | Stop reason: SDUPTHER

## 2021-12-10 ENCOUNTER — TELEPHONE (OUTPATIENT)
Dept: FAMILY MEDICINE CLINIC | Facility: CLINIC | Age: 41
End: 2021-12-10

## 2021-12-10 DIAGNOSIS — F43.0 PANIC ATTACK AS REACTION TO STRESS: ICD-10-CM

## 2021-12-10 DIAGNOSIS — F41.0 PANIC ATTACK AS REACTION TO STRESS: ICD-10-CM

## 2021-12-10 RX ORDER — ALPRAZOLAM 0.5 MG/1
0.5 TABLET ORAL 3 TIMES DAILY PRN
Qty: 30 TABLET | Refills: 2 | Status: CANCELLED | OUTPATIENT
Start: 2021-12-10

## 2021-12-10 RX ORDER — DEXTROAMPHETAMINE SACCHARATE, AMPHETAMINE ASPARTATE MONOHYDRATE, DEXTROAMPHETAMINE SULFATE AND AMPHETAMINE SULFATE 2.5; 2.5; 2.5; 2.5 MG/1; MG/1; MG/1; MG/1
10 CAPSULE, EXTENDED RELEASE ORAL 2 TIMES DAILY
Qty: 60 CAPSULE | Refills: 0 | Status: CANCELLED | OUTPATIENT
Start: 2021-12-10

## 2021-12-10 NOTE — TELEPHONE ENCOUNTER
Caller: Dave Chavez    Relationship: Self    Best call back number: [phone]    Requested Prescriptions:   Requested Prescriptions     Pending Prescriptions Disp Refills   • amphetamine-dextroamphetamine XR (Adderall XR) 10 MG 24 hr capsule 60 capsule 0     Sig: Take 1 capsule by mouth 2 (Two) Times a Day   • ALPRAZolam (XANAX) 0.5 MG tablet 30 tablet 2     Sig: Take 1 tablet by mouth 3 (Three) Times a Day As Needed for Anxiety.        Pharmacy where request should be sent: [facility] - [address] [phone] [facility] [phone] [facility]     Additional details provided by patient: PATIENT STATES THAT  WILL NOT DO SCRIPTS TO [facility] SO HE WOULD LIKE TO TRY [facility].     Does the patient have less than a 3 day supply:  [] Yes  [x] No    Burak Shane Rep   12/10/21 08:14 EST

## 2021-12-10 NOTE — TELEPHONE ENCOUNTER
PATIENT CALLED TO REQUEST REFILLS OF ALPRAZolam (XANAX) 0.5 MG tablet AND amphetamine-dextroamphetamine XR (Adderall XR) 10 MG 24 hr capsule TO BE SENT TO [facility]. PATIENT HAD HIS MEDICATIONS SENT TO [facility] BUT HIS INSURANCE PLAN DOES NOT COVER THE TOTAL COST OF THE MEDICATION, NEEDS TO BE CANCELED AT [facility] AND SENT TO [facility] INSTEAD.    PLEASE ADVISE    [phone]

## 2021-12-13 RX ORDER — ALLOPURINOL 300 MG/1
300 TABLET ORAL DAILY
Qty: 90 TABLET | Refills: 3 | Status: SHIPPED | OUTPATIENT
Start: 2021-12-13 | End: 2023-01-19 | Stop reason: SDUPTHER

## 2021-12-13 RX ORDER — ROSUVASTATIN CALCIUM 5 MG/1
5 TABLET, COATED ORAL DAILY
Qty: 90 TABLET | Refills: 3 | Status: SHIPPED | OUTPATIENT
Start: 2021-12-13 | End: 2023-01-19 | Stop reason: SDUPTHER

## 2021-12-13 RX ORDER — LISINOPRIL 10 MG/1
10 TABLET ORAL DAILY
Qty: 90 TABLET | Refills: 3 | Status: SHIPPED | OUTPATIENT
Start: 2021-12-13 | End: 2022-06-30 | Stop reason: SINTOL

## 2021-12-15 DIAGNOSIS — F41.0 PANIC ATTACK AS REACTION TO STRESS: ICD-10-CM

## 2021-12-15 DIAGNOSIS — F43.0 PANIC ATTACK AS REACTION TO STRESS: ICD-10-CM

## 2021-12-15 RX ORDER — DEXTROAMPHETAMINE SACCHARATE, AMPHETAMINE ASPARTATE MONOHYDRATE, DEXTROAMPHETAMINE SULFATE AND AMPHETAMINE SULFATE 2.5; 2.5; 2.5; 2.5 MG/1; MG/1; MG/1; MG/1
10 CAPSULE, EXTENDED RELEASE ORAL 2 TIMES DAILY
Qty: 180 CAPSULE | Refills: 0 | Status: SHIPPED | OUTPATIENT
Start: 2021-12-15 | End: 2022-04-28 | Stop reason: SDUPTHER

## 2021-12-15 RX ORDER — ALPRAZOLAM 0.5 MG/1
0.5 TABLET ORAL 3 TIMES DAILY PRN
Qty: 90 TABLET | Refills: 0 | Status: SHIPPED | OUTPATIENT
Start: 2021-12-15 | End: 2022-02-15

## 2021-12-17 ENCOUNTER — TELEPHONE (OUTPATIENT)
Dept: FAMILY MEDICINE CLINIC | Facility: CLINIC | Age: 41
End: 2021-12-17

## 2021-12-17 NOTE — TELEPHONE ENCOUNTER
Caller: Dave Chavez    Relationship: Self    Best call back number: [phone]    What is the medical concern/diagnosis: ANXIETY, PANIS ATTACK, DEPRESSION    What specialty or service is being requested: PSYCHIATRIST OR THERAPIST    What is the provider, practice or medical service name: N/A    What is the office location: N/A    What is the office phone number: N/A    Any additional details: N/A

## 2021-12-21 NOTE — TELEPHONE ENCOUNTER
HUB TO READ: CALLED AND LEFT MESSAGE FOR PT ADVISING HE WILL NEED TO CHECK WITH  HIS INSURANCE TO SEE WHO IS COVERED.

## 2022-02-11 DIAGNOSIS — F43.0 PANIC ATTACK AS REACTION TO STRESS: ICD-10-CM

## 2022-02-11 DIAGNOSIS — F41.0 PANIC ATTACK AS REACTION TO STRESS: ICD-10-CM

## 2022-02-15 RX ORDER — ALPRAZOLAM 0.5 MG/1
TABLET ORAL
Qty: 90 TABLET | Refills: 3 | Status: SHIPPED | OUTPATIENT
Start: 2022-02-15 | End: 2022-04-28 | Stop reason: SDUPTHER

## 2022-03-14 ENCOUNTER — OFFICE VISIT (OUTPATIENT)
Dept: FAMILY MEDICINE CLINIC | Facility: CLINIC | Age: 42
End: 2022-03-14

## 2022-03-14 VITALS
DIASTOLIC BLOOD PRESSURE: 82 MMHG | WEIGHT: 195 LBS | BODY MASS INDEX: 27.3 KG/M2 | OXYGEN SATURATION: 99 % | TEMPERATURE: 97.6 F | HEART RATE: 88 BPM | HEIGHT: 71 IN | SYSTOLIC BLOOD PRESSURE: 138 MMHG

## 2022-03-14 DIAGNOSIS — H65.192 OTHER NON-RECURRENT ACUTE NONSUPPURATIVE OTITIS MEDIA OF LEFT EAR: Primary | ICD-10-CM

## 2022-03-14 PROCEDURE — 99213 OFFICE O/P EST LOW 20 MIN: CPT | Performed by: STUDENT IN AN ORGANIZED HEALTH CARE EDUCATION/TRAINING PROGRAM

## 2022-03-14 RX ORDER — AMOXICILLIN AND CLAVULANATE POTASSIUM 875; 125 MG/1; MG/1
1 TABLET, FILM COATED ORAL 2 TIMES DAILY
Qty: 14 TABLET | Refills: 0 | Status: SHIPPED | OUTPATIENT
Start: 2022-03-14 | End: 2022-06-30

## 2022-03-29 NOTE — PROGRESS NOTES
"Chief Complaint  Earache (Ear problem started 3 wks ago , pain started 2 days ago and yesterday pt stated he experienced nausea and dizziness )    Subjective          Dave Chavez presents to [facility]  Complain of earache for 2 days.  Patient reported his job is hard nowadays and with earache other symptoms he is experiencing is nausea and dizziness.  Patient also noticed ear popping while he was traveling 3 weeks ago and is wondering if this earache is related to that.  Review of system is negative for fever, headache, chest pain, shortness of breath, palpitation, nausea, vomiting, any recent change in bladder habits.      Earache         Objective   Vital Signs:   /82   Pulse 88   Temp 97.6 °F (36.4 °C)   Ht 180.3 cm (70.98\")   Wt 88.5 kg (195 lb)   SpO2 99%   BMI 27.21 kg/m²     BMI is above normal parameters. Recommendations: educational material discussed/shared in after visit summary, exercise counseling/recommendations, nutrition counseling/recommendations and pharmacological intervention options       Physical Exam  HENT:      Head: Normocephalic and atraumatic.      Ears:      Comments: Mild opaqueness on left  tympanic membrane noted, mild redness EAC noted     Mouth/Throat:      Mouth: Mucous membranes are moist.      Pharynx: Oropharynx is clear.   Eyes:      Extraocular Movements: Extraocular movements intact.      Conjunctiva/sclera: Conjunctivae normal.      Pupils: Pupils are equal, round, and reactive to light.   Cardiovascular:      Rate and Rhythm: Normal rate and regular rhythm.   Pulmonary:      Effort: Pulmonary effort is normal.      Breath sounds: Normal breath sounds.   Abdominal:      General: Bowel sounds are normal.      Palpations: Abdomen is soft.   Musculoskeletal:         General: Normal range of motion.      Cervical back: Neck supple.   Skin:     General: Skin is warm.      Capillary Refill: Capillary refill takes less than 2 seconds. "   Neurological:      General: No focal deficit present.      Mental Status: He is alert and oriented to person, place, and time. Mental status is at baseline.   Psychiatric:         Mood and Affect: Mood normal.        Result Review :     CMP    CMP 11/4/21   Glucose 97   BUN 9   Creatinine 1.08   eGFR Non  Am 85   eGFR  Am 99   Sodium 143   Potassium 4.8   Chloride 105   Calcium 9.0   Total Protein 6.8   Albumin 4.5   Globulin 2.3   Total Bilirubin 0.4   Alkaline Phosphatase 127 (A)   AST (SGOT) 61 (A)   ALT (SGPT) 78 (A)   (A) Abnormal value       Comments are available for some flowsheets but are not being displayed.               Lipid Panel    Lipid Panel 11/4/21   Total Cholesterol 175   Triglycerides 211 (A)   HDL Cholesterol 66   VLDL Cholesterol 34   LDL Cholesterol  75   LDL/HDL Ratio 1.1   (A) Abnormal value       Comments are available for some flowsheets but are not being displayed.               Most Recent A1C    HGBA1C Most Recent 11/4/21   Hemoglobin A1C 5.5      Comments are available for some flowsheets but are not being displayed.                     Assessment and Plan    Diagnoses and all orders for this visit:    1. Other non-recurrent acute nonsuppurative otitis media of left ear (Primary)  Comments:  Prescribed Augmentin for 10 days, RTC if symptoms persist, encouraged to drink a lot of water for hydration  Orders:  -     amoxicillin-clavulanate (Augmentin) 875-125 MG per tablet; Take 1 tablet by mouth 2 (Two) Times a Day.  Dispense: 14 tablet; Refill: 0        Follow Up   Return if symptoms worsen or fail to improve.  Patient was given instructions and counseling regarding his condition or for health maintenance advice. Please see specific information pulled into the AVS if appropriate.

## 2022-04-28 DIAGNOSIS — F41.0 PANIC ATTACK AS REACTION TO STRESS: ICD-10-CM

## 2022-04-28 DIAGNOSIS — F43.0 PANIC ATTACK AS REACTION TO STRESS: ICD-10-CM

## 2022-05-03 RX ORDER — ALPRAZOLAM 0.5 MG/1
0.5 TABLET ORAL 3 TIMES DAILY PRN
Qty: 90 TABLET | Refills: 1 | Status: SHIPPED | OUTPATIENT
Start: 2022-05-03 | End: 2022-10-19

## 2022-05-03 RX ORDER — DEXTROAMPHETAMINE SACCHARATE, AMPHETAMINE ASPARTATE MONOHYDRATE, DEXTROAMPHETAMINE SULFATE AND AMPHETAMINE SULFATE 2.5; 2.5; 2.5; 2.5 MG/1; MG/1; MG/1; MG/1
10 CAPSULE, EXTENDED RELEASE ORAL 2 TIMES DAILY
Qty: 180 CAPSULE | Refills: 0 | Status: SHIPPED | OUTPATIENT
Start: 2022-05-03 | End: 2022-06-30 | Stop reason: SDUPTHER

## 2022-05-06 DIAGNOSIS — M10.9 GOUT, UNSPECIFIED CAUSE, UNSPECIFIED CHRONICITY, UNSPECIFIED SITE: ICD-10-CM

## 2022-05-06 DIAGNOSIS — R53.82 CHRONIC FATIGUE: ICD-10-CM

## 2022-05-06 DIAGNOSIS — R73.9 ELEVATED BLOOD SUGAR: ICD-10-CM

## 2022-05-06 DIAGNOSIS — R68.82 DECREASED LIBIDO: ICD-10-CM

## 2022-05-06 DIAGNOSIS — Z00.00 ROUTINE HEALTH MAINTENANCE: Primary | ICD-10-CM

## 2022-06-30 ENCOUNTER — OFFICE VISIT (OUTPATIENT)
Dept: FAMILY MEDICINE CLINIC | Facility: CLINIC | Age: 42
End: 2022-06-30

## 2022-06-30 VITALS
WEIGHT: 191.8 LBS | OXYGEN SATURATION: 98 % | RESPIRATION RATE: 18 BRPM | HEART RATE: 97 BPM | DIASTOLIC BLOOD PRESSURE: 80 MMHG | BODY MASS INDEX: 26.85 KG/M2 | HEIGHT: 71 IN | SYSTOLIC BLOOD PRESSURE: 130 MMHG | TEMPERATURE: 96.4 F

## 2022-06-30 DIAGNOSIS — M10.9 GOUT, UNSPECIFIED CAUSE, UNSPECIFIED CHRONICITY, UNSPECIFIED SITE: ICD-10-CM

## 2022-06-30 DIAGNOSIS — J30.89 ENVIRONMENTAL AND SEASONAL ALLERGIES: ICD-10-CM

## 2022-06-30 DIAGNOSIS — F41.0 PANIC ATTACK AS REACTION TO STRESS: ICD-10-CM

## 2022-06-30 DIAGNOSIS — E78.49 OTHER HYPERLIPIDEMIA: ICD-10-CM

## 2022-06-30 DIAGNOSIS — I10 PRIMARY HYPERTENSION: ICD-10-CM

## 2022-06-30 DIAGNOSIS — R79.89 ABNORMAL LFTS (LIVER FUNCTION TESTS): ICD-10-CM

## 2022-06-30 DIAGNOSIS — F43.0 PANIC ATTACK AS REACTION TO STRESS: ICD-10-CM

## 2022-06-30 DIAGNOSIS — F98.8 ATTENTION DEFICIT DISORDER (ADD) WITHOUT HYPERACTIVITY: ICD-10-CM

## 2022-06-30 DIAGNOSIS — Z00.00 HEALTH MAINTENANCE EXAMINATION: Primary | ICD-10-CM

## 2022-06-30 DIAGNOSIS — G47.30 MILD SLEEP APNEA: ICD-10-CM

## 2022-06-30 PROCEDURE — 99396 PREV VISIT EST AGE 40-64: CPT | Performed by: INTERNAL MEDICINE

## 2022-06-30 RX ORDER — DEXTROAMPHETAMINE SACCHARATE, AMPHETAMINE ASPARTATE MONOHYDRATE, DEXTROAMPHETAMINE SULFATE AND AMPHETAMINE SULFATE 2.5; 2.5; 2.5; 2.5 MG/1; MG/1; MG/1; MG/1
10 CAPSULE, EXTENDED RELEASE ORAL 2 TIMES DAILY
Qty: 180 CAPSULE | Refills: 0 | Status: SHIPPED | OUTPATIENT
Start: 2022-06-30 | End: 2023-01-19 | Stop reason: SDUPTHER

## 2022-06-30 RX ORDER — LOSARTAN POTASSIUM 50 MG/1
50 TABLET ORAL DAILY
Qty: 90 TABLET | Refills: 3 | Status: SHIPPED | OUTPATIENT
Start: 2022-06-30 | End: 2023-02-15 | Stop reason: SDUPTHER

## 2022-06-30 NOTE — PROGRESS NOTES
Subjective   Dave Chavez is a 41 y.o. male. Patient is here today for a complete physical exam.  He generally feels well and has no acute complaints.  He does tell me that his wife says he has some apneic episodes when he is sleeping.  He also has loud snoring.  He also has a dry cough that somewhat irritating and wonders if it is from his lisinopril  PMH-history of hypertension, gout, ADD, kidney stones in the past and tonsillectomy and vasectomy as his only operations.  SH-the patient is , sexually active without problem.  He continues to work as a  for the .  He is a non-smoker and has about 4-6 alcoholic drinks per week.  He is up-to-date on dental and vision checks and walks daily for 20 to 30 minutes.  FH-patient's parents are both in their early 60s.  The father has hypertension and hyperlipidemia no other family diseases    Chief Complaint   Patient presents with   • Annual Exam     PT HERE FOR CPE          Vitals:    06/30/22 1447   BP: 140/80   Pulse: 97   Resp: 18   Temp: 96.4 °F (35.8 °C)   SpO2: 98%     Body mass index is 26.76 kg/m².    The following portions of the patient's history were reviewed and updated as appropriate: allergies, current medications, past family history, past medical history, past social history, past surgical history and problem list.    Past Medical History:   Diagnosis Date   • ADHD (attention deficit hyperactivity disorder) 7/25/2018    Treated with straterra but medication made me feel nauseous   • Allergic 01/01/1991    Currently taking allergy shots 1x/wk for seasonal allergies   • Anxiety 02/01/2018    Treated with paxil and resolved around 5/1/2018   • Arthritis 01/01/2014    Noted in left knee and right large toe   • Depression 02/01/2018    Treated with paxil and resolved around 5/1/2018   • Hypertension 01/01/2015    Never treated with medication   • Irritable bowel syndrome 02/01/2018    Prescribed Bentyl   • Kidney stone 01/01/2001    Hx of  kidney stones.  Treated at [facility].      Allergies   Allergen Reactions   • Penicillins Unknown - Low Severity     childhood      Social History     Socioeconomic History   • Marital status:    Tobacco Use   • Smoking status: Former Smoker     Packs/day: 0.50     Years: 10.00     Pack years: 5.00     Types: Cigarettes, Cigarettes     Start date: 2002     Quit date: 2016     Years since quittin.9   • Smokeless tobacco: Never Used   • Tobacco comment: Quit using lozenges and the Quit-Now phone line   Substance and Sexual Activity   • Alcohol use: Yes     Alcohol/week: 9.0 standard drinks     Types: 3 Glasses of wine, 6 Cans of beer per week   • Drug use: No   • Sexual activity: Yes     Partners: Female     Birth control/protection: Surgical     Comment: Vasectomy        Current Outpatient Medications:   •  allopurinol (Zyloprim) 300 MG tablet, Take 1 tablet by mouth Daily., Disp: 90 tablet, Rfl: 3  •  ALPRAZolam (XANAX) 0.5 MG tablet, Take 1 tablet by mouth 3 (Three) Times a Day As Needed for Anxiety., Disp: 90 tablet, Rfl: 1  •  amphetamine-dextroamphetamine XR (Adderall XR) 10 MG 24 hr capsule, Take 1 capsule by mouth 2 (Two) Times a Day, Disp: 180 capsule, Rfl: 0  •  colchicine 0.6 MG tablet, TAKE 2 PO WITH GOUT FLARE AND CAN REPEAT IN 6 HOURS IF NEEDED.  CAN TAKE 1 PO DAILY AS NEEDED, Disp: 30 tablet, Rfl: 5  •  rosuvastatin (Crestor) 5 MG tablet, Take 1 tablet by mouth Daily., Disp: 90 tablet, Rfl: 3  •  losartan (Cozaar) 50 MG tablet, Take 1 tablet by mouth Daily., Disp: 90 tablet, Rfl: 3     EKG  ECG Report    Objective   History of Present Illness   Dave Chavez 41 y.o. male who presents for an Annual Wellness Visit.  he has a history of   Patient Active Problem List   Diagnosis   • Attention deficit disorder (ADD) without hyperactivity   • BP (high blood pressure)   • Chronic fatigue   • Gout   • Bilateral knee pain   • Abnormal LFTs (liver function tests)   • Anxiety and  depression   • Insomnia   • Other hyperlipidemia   • Panic attack as reaction to stress   • Right elbow pain   • Environmental and seasonal allergies   • Mild sleep apnea   .  he has been doing well with new interval problems.  Labs results discussed in detail with the patient.  Plan to update vaccines if needed today.    Health Habits:  Dental Exam. up to date  Eye Exam. up to date  Exercise: 7 times/week.  Current exercise activities include: walking      Lab Results (most recent)     None            Review of Systems   All other systems reviewed and are negative.      Physical Exam  Vitals and nursing note reviewed.   Constitutional:       General: He is not in acute distress.     Appearance: Normal appearance. He is not ill-appearing.   HENT:      Head: Normocephalic and atraumatic.      Right Ear: Tympanic membrane, ear canal and external ear normal.      Left Ear: Tympanic membrane, ear canal and external ear normal.   Eyes:      General: No scleral icterus.        Right eye: No discharge.         Left eye: No discharge.      Conjunctiva/sclera: Conjunctivae normal.   Neck:      Vascular: No carotid bruit.   Cardiovascular:      Rate and Rhythm: Normal rate and regular rhythm.      Heart sounds: Normal heart sounds. No murmur heard.    No friction rub. No gallop.   Pulmonary:      Effort: Pulmonary effort is normal. No respiratory distress.      Breath sounds: Normal breath sounds. No wheezing or rales.   Abdominal:      General: Abdomen is flat. Bowel sounds are normal. There is no distension.      Palpations: Abdomen is soft. There is no mass.      Tenderness: There is no abdominal tenderness. There is no right CVA tenderness, left CVA tenderness, guarding or rebound.      Hernia: No hernia is present.   Genitourinary:     Penis: Normal.       Testes: Normal.      Comments: No inguinal hernias  Musculoskeletal:         General: Normal range of motion.      Cervical back: Normal range of motion and neck  supple. No rigidity or tenderness.      Right lower leg: No edema.      Left lower leg: No edema.   Lymphadenopathy:      Cervical: No cervical adenopathy.   Skin:     General: Skin is warm and dry.   Neurological:      General: No focal deficit present.      Mental Status: He is alert and oriented to person, place, and time.   Psychiatric:         Mood and Affect: Mood normal.         Behavior: Behavior normal.         Thought Content: Thought content normal.         Judgment: Judgment normal.         ASSESSMENT CBC was normal.  CMP had elevated but stable AST of 44 and ALT of 58 and other values were normal.  Lipid panel has total cholesterol of 200, HDL 78, LDL 90.  Urinalysis was clear.  Hemoglobin A1c was normal at 5.5.  Uric acid is controlled at 3.9 on allopurinol.  TSH and free T4 were both normal.  Testosterone total and free were normal.  #1-environmental and seasonal allergies, stable on allergy shots  #2-hypertension controlled but possibly with an ACE cough  #3-hyperlipidemia, controlled  #4-mildly elevated but stable liver tests, asymptomatic  #5-ADD, stable on medication  #6-rare panic attacks, stable on medication  #7-gout, controlled on allopurinol  #8-witnessed apneic spells, need to rule out obstructive sleep apnea       Problems Addressed this Visit        Allergies and Adverse Reactions    Environmental and seasonal allergies       Cardiac and Vasculature    BP (high blood pressure)    Relevant Medications    losartan (Cozaar) 50 MG tablet    Other hyperlipidemia       Gastrointestinal Abdominal     Abnormal LFTs (liver function tests)       Mental Health    Attention deficit disorder (ADD) without hyperactivity    Relevant Medications    amphetamine-dextroamphetamine XR (Adderall XR) 10 MG 24 hr capsule    Panic attack as reaction to stress    Relevant Medications    amphetamine-dextroamphetamine XR (Adderall XR) 10 MG 24 hr capsule       Musculoskeletal and Injuries    Gout       Sleep    Mild  sleep apnea    Relevant Orders    Ambulatory Referral to Sleep Medicine      Other Visit Diagnoses     Health maintenance examination    -  Primary      Diagnoses       Codes Comments    Health maintenance examination    -  Primary ICD-10-CM: Z00.00  ICD-9-CM: V70.0     Environmental and seasonal allergies     ICD-10-CM: J30.89  ICD-9-CM: 477.8     Primary hypertension     ICD-10-CM: I10  ICD-9-CM: 401.9     Other hyperlipidemia     ICD-10-CM: E78.49  ICD-9-CM: 272.4     Abnormal LFTs (liver function tests)     ICD-10-CM: R79.89  ICD-9-CM: 790.6     Attention deficit disorder (ADD) without hyperactivity     ICD-10-CM: F98.8  ICD-9-CM: 314.00     Panic attack as reaction to stress     ICD-10-CM: F41.0, F43.0  ICD-9-CM: 308.0     Gout, unspecified cause, unspecified chronicity, unspecified site     ICD-10-CM: M10.9  ICD-9-CM: 274.9     Mild sleep apnea     ICD-10-CM: G47.30  ICD-9-CM: 780.57           PLAN I am referring the patient to sleep medicine for further evaluation because of his witnessed apneic spells.  I am discontinuing the lisinopril and will try the patient on losartan 50 mg daily for his blood pressure and to see if that resolves his coughing.  He will continue other medications as now.  I recommended he get a third COVID-19 vaccination and a flu shot in October.  I plan on rechecking him in 6 months with laboratory studies      There are no Patient Instructions on file for this visit.    No follow-ups on file.

## 2022-09-19 ENCOUNTER — OFFICE VISIT (OUTPATIENT)
Dept: SLEEP MEDICINE | Facility: HOSPITAL | Age: 42
End: 2022-09-19

## 2022-09-19 VITALS
OXYGEN SATURATION: 98 % | HEART RATE: 94 BPM | WEIGHT: 194 LBS | BODY MASS INDEX: 27.16 KG/M2 | HEIGHT: 71 IN | DIASTOLIC BLOOD PRESSURE: 97 MMHG | SYSTOLIC BLOOD PRESSURE: 141 MMHG

## 2022-09-19 DIAGNOSIS — G47.33 OSA (OBSTRUCTIVE SLEEP APNEA): Primary | ICD-10-CM

## 2022-09-19 DIAGNOSIS — G47.30 MILD SLEEP APNEA: ICD-10-CM

## 2022-09-19 PROCEDURE — G0463 HOSPITAL OUTPT CLINIC VISIT: HCPCS

## 2022-09-19 RX ORDER — ZOLPIDEM TARTRATE 5 MG/1
5 TABLET ORAL
Qty: 2 TABLET | Refills: 0 | Status: SHIPPED | OUTPATIENT
Start: 2022-09-19 | End: 2022-09-19

## 2022-09-19 NOTE — PROGRESS NOTES
[facility]      Patient Care Team:  Jovon Ahumada MD as PCP - General (Internal Medicine)    Referring Provider: Joovn Ahumada MD     Chief complaint:   Daytime fatigue/sleepiness    History of present illness:    Subjective   This is a 41 year old male patient with hx of ADD, anxiety/depression and HTN.    He reported loud snoring which sometimes awaken him and often disturbs his wife who changes sleep place.  He has also awakened himself gasping for breath.    He wakes up with a dry mouth sometimes but denies headache.  Additional symptoms include grinding teeth at night.    Sleep schedule:  -Bedtime: 9:30 PM- midnight- sometimes Xanax 0.5 mg (prescribed for anxiety)  -Sleep latency: 45-90 min: mindracing. He watches TV and sometimes uses his cell phone.   -Wake up time: 4 AM- 8 AM , does not feel refreshed  -Nocturnal awakenin times because of nocturia.  No difficulties going back to sleep.  -Perceived sleep hours: 5-6    On the nights he gets 8 hours + , he could still feel groggy.     He also does a lot of traveling and covers 2 time zones.     ESS: Total score: 9     Review of Systems  Constitutional: No fever or chills. No changes in appetite.   ENMT: Recurrent nasal bleeding.  No nasal congestion or postnasal drip.  Cardiovascular: No chest pain, palpitation or legs swelling.    Respiratory: No dyspnea, cough or wheezing.  Gastrointestinal: No constipation, diarrhea, abdominal pain or acid reflux  Neurology: No headache, weakness, numbness or dizziness.   Musculoskeletal: Pain in muscles and joints.  Psychiatry: Anxiety but no depression.  Hem/Lymphatic: No swollen glands or easy bruising.  Integumentary: No rash.  Endocrinology: No excessive thirst, cold or warm intolerance.   Urinary: No dysuria, bloody urine or frequent urination.     History  Past Medical History:   Diagnosis Date   • ADHD (attention deficit hyperactivity  disorder) 2018    Treated with straterra but medication made me feel nauseous   • Allergic 1991    Currently taking allergy shots 1x/wk for seasonal allergies   • Anxiety 2018    Treated with paxil and resolved around 2018   • Arthritis 2014    Noted in left knee and right large toe   • Depression 2018    Treated with paxil and resolved around 2018   • Hypertension 2015    Never treated with medication   • Irritable bowel syndrome 2018    Prescribed Bentyl   • Kidney stone 2001    Hx of kidney stones.  Treated at [facility].   ,   Past Surgical History:   Procedure Laterality Date   • ADENOIDECTOMY  2017   • KIDNEY STONE SURGERY     • TONSILLECTOMY     • VASECTOMY     ,   Family History   Problem Relation Age of Onset   • Anxiety disorder Mother    • Arthritis Father    • Hyperlipidemia Father    • Arthritis Paternal Grandmother    • Hyperlipidemia Paternal Grandmother    ,   Social History     Socioeconomic History   • Marital status:    Tobacco Use   • Smoking status: Former Smoker     Packs/day: 0.50     Years: 10.00     Pack years: 5.00     Types: Cigarettes, Cigarettes     Start date: 2002     Quit date: 2016     Years since quittin.2   • Smokeless tobacco: Never Used   • Tobacco comment: Quit using lozenges and the 800-Quit-Now phone line   Substance and Sexual Activity   • Alcohol use: Yes     Alcohol/week: 9.0 standard drinks     Types: 3 Glasses of wine, 6 Cans of beer per week   • Drug use: No   • Sexual activity: Yes     Partners: Female     Birth control/protection: Surgical     Comment: Vasectomy     E-cigarette/Vaping     E-cigarette/Vaping Substances     E-cigarette/Vaping Devices        and Allergies:  Penicillins    Medications:    Current Outpatient Medications:   •  allopurinol (Zyloprim) 300 MG tablet, Take 1 tablet by mouth Daily., Disp: 90 tablet, Rfl: 3  •  ALPRAZolam (XANAX) 0.5 MG tablet, Take 1 tablet by  "mouth 3 (Three) Times a Day As Needed for Anxiety., Disp: 90 tablet, Rfl: 1  •  amphetamine-dextroamphetamine XR (Adderall XR) 10 MG 24 hr capsule, Take 1 capsule by mouth 2 (Two) Times a Day, Disp: 180 capsule, Rfl: 0  •  colchicine 0.6 MG tablet, TAKE 2 PO WITH GOUT FLARE AND CAN REPEAT IN 6 HOURS IF NEEDED.  CAN TAKE 1 PO DAILY AS NEEDED, Disp: 30 tablet, Rfl: 5  •  losartan (Cozaar) 50 MG tablet, Take 1 tablet by mouth Daily., Disp: 90 tablet, Rfl: 3  •  rosuvastatin (Crestor) 5 MG tablet, Take 1 tablet by mouth Daily., Disp: 90 tablet, Rfl: 3      Objective   Vital Signs:  Vitals:    09/19/22 1125   BP: 141/97   Pulse: 94   SpO2: 98%   Weight: 88 kg (194 lb)   Height: 180.3 cm (70.98\")     Body mass index is 27.07 kg/m².        Physical Exam:        Constitutional: Not in acute distress.  Eyes: Injected conjunctiva, EOMI. pupils equal reactive to light.  ENMT: Walker score 3. Mallampati score 3. No oral thrush. Tonsils grade 1. Narrow distance in between the posterior pharyngeal pillars (<50 %). Scalloped tongue.  Neck: Large. No thyromegaly.  Trachea midline.  Heart: Regular rhythm and rate, no murmur  Lungs: Good and equal air entry bilaterally. No crackles or wheezing.  Nonlabored breathing.       Abdomen: Obese.  Soft.  No tenderness.  Positive bowel sounds.  Extremities: No cyanosis, clubbing or pitting edema.  Warm extremities and well-perfused.  Neuro: Conscious, alert, oriented x3.  Gait is normal.  Strength 5/5 in arms and legs.  Psych: Appropriate mood and affect.    Integumentary: No rash.  Normal skin turgor.  Lymphatic: No palpable cervical or supraclavicular lymph nodes.    Diagnostic data:  Lab Results   Component Value Date    HGBA1C 5.5 05/12/2022     Triglycerides   Date Value Ref Range Status   05/12/2022 190 (H) 0 - 149 mg/dL Final     HDL Cholesterol   Date Value Ref Range Status   05/12/2022 78 >39 mg/dL Final     Hemoglobin   Date Value Ref Range Status   05/12/2022 14.9 13.0 - 17.7 " g/dL Final   12/02/2018 15.2 14.0 - 18.0 g/dL Final     CO2   Date Value Ref Range Status   12/02/2018 26.0 26.0 - 30.0 mmol/L Final     Total CO2   Date Value Ref Range Status   05/12/2022 23 20 - 29 mmol/L Final   09/05/2018 27 22 - 30 mmol/L Final       Assessment   1. Snoring, probable sleep apnea  2. Inadequate sleep hygiene  3. Insomnia, chronic.  Multifactorial.  Secondary to psychophysiological insomnia and inadequate sleep hygiene  4. Overweight, BMI 27    Plan:  Check PSG. We discussed the pathophysiology of sleep apnea, testing and therapy which include CPAP and weight loss.  Patient is agreeable with CPAP therapy if needed.    Counseled for weight loss.  Encouraged to exercise regularly and cut down on carbohydrates.  Discussed that losing weight may decrease the severity of sleep apnea and obviate the need of CPAP therapy.    Discussed sleep hygiene.  Encouraged to not go to bed unless he feels sleepy and avoid staying in bed long time.  Also avoid electronics and TV in bed.        Dionicio Leach MD  09/19/22  11:26 EDT    This note was dictated utilizing Dragon dictation

## 2022-10-18 DIAGNOSIS — F41.0 PANIC ATTACK AS REACTION TO STRESS: ICD-10-CM

## 2022-10-18 DIAGNOSIS — F43.0 PANIC ATTACK AS REACTION TO STRESS: ICD-10-CM

## 2022-10-19 RX ORDER — ALPRAZOLAM 0.5 MG/1
TABLET ORAL
Qty: 90 TABLET | Refills: 1 | Status: SHIPPED | OUTPATIENT
Start: 2022-10-19 | End: 2023-02-15 | Stop reason: SDUPTHER

## 2022-10-19 NOTE — TELEPHONE ENCOUNTER
Rx Refill Note  Requested Prescriptions     Pending Prescriptions Disp Refills   • ALPRAZolam (XANAX) 0.5 MG tablet [Pharmacy Med Name: ALPRAZolam 0.5 MG TABLET] 90 tablet      Sig: TAKE ONE TABLET BY MOUTH THREE TIMES A DAY AS NEEDED FOR ANXIETY      Last office visit with prescribing clinician: 6/30/2022      Next office visit with prescribing clinician: 1/19/2023            Kandice Dee MA  10/19/22, 06:50 EDT

## 2022-12-14 ENCOUNTER — HOSPITAL ENCOUNTER (OUTPATIENT)
Dept: SLEEP MEDICINE | Facility: HOSPITAL | Age: 42
Discharge: HOME | End: 2022-12-14
Admitting: INTERNAL MEDICINE
Payer: OTHER GOVERNMENT

## 2022-12-14 DIAGNOSIS — G47.33 OSA (OBSTRUCTIVE SLEEP APNEA): ICD-10-CM

## 2022-12-14 PROCEDURE — 95810 POLYSOM 6/> YRS 4/> PARAM: CPT

## 2023-01-16 DIAGNOSIS — G47.33 OSA (OBSTRUCTIVE SLEEP APNEA): Primary | ICD-10-CM

## 2023-01-17 ENCOUNTER — TELEPHONE (OUTPATIENT)
Dept: SLEEP MEDICINE | Facility: HOSPITAL | Age: 43
End: 2023-01-17
Payer: OTHER GOVERNMENT

## 2023-01-17 NOTE — TELEPHONE ENCOUNTER
Spoke with pt about results. [facility]. Pt will cb and schedule an appt for compliance.

## 2023-01-19 ENCOUNTER — OFFICE VISIT (OUTPATIENT)
Dept: FAMILY MEDICINE CLINIC | Facility: CLINIC | Age: 43
End: 2023-01-19
Payer: OTHER GOVERNMENT

## 2023-01-19 VITALS
WEIGHT: 200.2 LBS | BODY MASS INDEX: 28.03 KG/M2 | OXYGEN SATURATION: 99 % | TEMPERATURE: 97.1 F | HEIGHT: 71 IN | HEART RATE: 102 BPM | SYSTOLIC BLOOD PRESSURE: 146 MMHG | DIASTOLIC BLOOD PRESSURE: 90 MMHG

## 2023-01-19 DIAGNOSIS — M10.9 GOUT, UNSPECIFIED CAUSE, UNSPECIFIED CHRONICITY, UNSPECIFIED SITE: ICD-10-CM

## 2023-01-19 DIAGNOSIS — F41.0 PANIC ATTACK AS REACTION TO STRESS: ICD-10-CM

## 2023-01-19 DIAGNOSIS — F43.0 PANIC ATTACK AS REACTION TO STRESS: ICD-10-CM

## 2023-01-19 DIAGNOSIS — J30.89 ENVIRONMENTAL AND SEASONAL ALLERGIES: Primary | ICD-10-CM

## 2023-01-19 DIAGNOSIS — R79.89 ABNORMAL LFTS (LIVER FUNCTION TESTS): ICD-10-CM

## 2023-01-19 DIAGNOSIS — I10 PRIMARY HYPERTENSION: ICD-10-CM

## 2023-01-19 DIAGNOSIS — F98.8 ATTENTION DEFICIT DISORDER (ADD) WITHOUT HYPERACTIVITY: ICD-10-CM

## 2023-01-19 DIAGNOSIS — E78.49 OTHER HYPERLIPIDEMIA: ICD-10-CM

## 2023-01-19 PROCEDURE — 99214 OFFICE O/P EST MOD 30 MIN: CPT | Performed by: INTERNAL MEDICINE

## 2023-01-19 RX ORDER — ROSUVASTATIN CALCIUM 5 MG/1
5 TABLET, COATED ORAL DAILY
Qty: 90 TABLET | Refills: 3 | Status: SHIPPED | OUTPATIENT
Start: 2023-01-19 | End: 2023-02-15 | Stop reason: SDUPTHER

## 2023-01-19 RX ORDER — ALLOPURINOL 300 MG/1
300 TABLET ORAL DAILY
Qty: 90 TABLET | Refills: 3 | Status: SHIPPED | OUTPATIENT
Start: 2023-01-19 | End: 2023-02-15 | Stop reason: SDUPTHER

## 2023-01-19 RX ORDER — DEXTROAMPHETAMINE SACCHARATE, AMPHETAMINE ASPARTATE MONOHYDRATE, DEXTROAMPHETAMINE SULFATE AND AMPHETAMINE SULFATE 2.5; 2.5; 2.5; 2.5 MG/1; MG/1; MG/1; MG/1
10 CAPSULE, EXTENDED RELEASE ORAL 2 TIMES DAILY
Qty: 180 CAPSULE | Refills: 0 | Status: SHIPPED | OUTPATIENT
Start: 2023-01-19 | End: 2023-02-15 | Stop reason: SDUPTHER

## 2023-01-19 RX ORDER — COLCHICINE 0.6 MG/1
TABLET ORAL
Qty: 30 TABLET | Refills: 5 | Status: SHIPPED | OUTPATIENT
Start: 2023-01-19 | End: 2023-02-15 | Stop reason: SDUPTHER

## 2023-01-19 NOTE — PROGRESS NOTES
Subjective   Dave Chavez is a 42 y.o. male. Patient is here today for follow-up on his environmental and seasonal allergies, hypertension, hyperlipidemia, elevated liver tests, ADD and periodic anxiety.  He also has a history of gout.  He is generally been stable but has been missing some of his statin medication.  He had 1 episode of gout because he forgot to take his allopurinol for a while.  Chief Complaint   Patient presents with   • Hypertension   • Hyperlipidemia     Follow up labs          Vitals:    23 1421   BP: 146/90   Pulse: 102   Temp: 97.1 °F (36.2 °C)   SpO2: 99%     Body mass index is 27.94 kg/m².  The following portions of the patient's history were reviewed and updated as appropriate: allergies, current medications, past family history, past medical history, past social history, past surgical history and problem list.    Past Medical History:   Diagnosis Date   • ADHD (attention deficit hyperactivity disorder) 2018    Treated with straterra but medication made me feel nauseous   • Allergic 1991    Currently taking allergy shots 1x/wk for seasonal allergies   • Anxiety 2018    Treated with paxil and resolved around 2018   • Arthritis 2014    Noted in left knee and right large toe   • Depression 2018    Treated with paxil and resolved around 2018   • Hypertension 2015    Never treated with medication   • Irritable bowel syndrome 2018    Prescribed Bentyl   • Kidney stone 2001    Hx of kidney stones.  Treated at [facility].      Allergies   Allergen Reactions   • Penicillins Unknown - Low Severity     childhood      Social History     Socioeconomic History   • Marital status:    Tobacco Use   • Smoking status: Former     Packs/day: 0.50     Years: 10.00     Pack years: 5.00     Types: Cigarettes     Start date: 2002     Quit date: 2016     Years since quittin.5   • Smokeless tobacco: Never   • Tobacco comments:      Quit using lozenges and the 1-800-Quit-Now phone line   Substance and Sexual Activity   • Alcohol use: Yes     Alcohol/week: 9.0 standard drinks     Types: 3 Glasses of wine, 6 Cans of beer per week   • Drug use: No   • Sexual activity: Yes     Partners: Female     Birth control/protection: Surgical     Comment: Vasectomy        Current Outpatient Medications:   •  allopurinol (Zyloprim) 300 MG tablet, Take 1 tablet by mouth Daily., Disp: 90 tablet, Rfl: 3  •  ALPRAZolam (XANAX) 0.5 MG tablet, TAKE ONE TABLET BY MOUTH THREE TIMES A DAY AS NEEDED FOR ANXIETY, Disp: 90 tablet, Rfl: 1  •  amphetamine-dextroamphetamine XR (Adderall XR) 10 MG 24 hr capsule, Take 1 capsule by mouth 2 (Two) Times a Day, Disp: 180 capsule, Rfl: 0  •  colchicine 0.6 MG tablet, TAKE 2 PO WITH GOUT FLARE AND CAN REPEAT IN 6 HOURS IF NEEDED.  CAN TAKE 1 PO DAILY AS NEEDED, Disp: 30 tablet, Rfl: 5  •  losartan (Cozaar) 50 MG tablet, Take 1 tablet by mouth Daily., Disp: 90 tablet, Rfl: 3  •  rosuvastatin (Crestor) 5 MG tablet, Take 1 tablet by mouth Daily., Disp: 90 tablet, Rfl: 3     Objective     History of Present Illness     Review of Systems    Physical Exam  Vitals and nursing note reviewed.   Constitutional:       General: He is not in acute distress.     Appearance: Normal appearance. He is not ill-appearing.   HENT:      Head: Normocephalic and atraumatic.   Cardiovascular:      Rate and Rhythm: Normal rate and regular rhythm.      Heart sounds: Normal heart sounds.   Pulmonary:      Effort: Pulmonary effort is normal. No respiratory distress.      Breath sounds: Normal breath sounds. No wheezing or rales.   Skin:     General: Skin is warm and dry.   Neurological:      General: No focal deficit present.      Mental Status: He is alert and oriented to person, place, and time.   Psychiatric:         Mood and Affect: Mood normal.         Behavior: Behavior normal.         ASSESSMENT CMP had an AST of 63 ALT 73 and was otherwise normal and  lipid panel was higher because of missing medication with total cholesterol 234, HDL 72,   #1-hypertension controlled on medication  #2-hyperlipidemia on statin medication that he needs to take regularly  #3-elevated liver tests mildly and asymptomatic and stable  #4-ADD, stable on medication  #5-anxiety and panic attacks, stable on medication       Problems Addressed this Visit        Allergies and Adverse Reactions    Environmental and seasonal allergies - Primary       Cardiac and Vasculature    BP (high blood pressure)    Other hyperlipidemia    Relevant Medications    rosuvastatin (Crestor) 5 MG tablet       Gastrointestinal Abdominal     Abnormal LFTs (liver function tests)       Mental Health    Attention deficit disorder (ADD) without hyperactivity    Relevant Medications    amphetamine-dextroamphetamine XR (Adderall XR) 10 MG 24 hr capsule    Panic attack as reaction to stress    Relevant Medications    amphetamine-dextroamphetamine XR (Adderall XR) 10 MG 24 hr capsule   Diagnoses       Codes Comments    Environmental and seasonal allergies    -  Primary ICD-10-CM: J30.89  ICD-9-CM: 477.8     Primary hypertension     ICD-10-CM: I10  ICD-9-CM: 401.9     Other hyperlipidemia     ICD-10-CM: E78.49  ICD-9-CM: 272.4     Abnormal LFTs (liver function tests)     ICD-10-CM: R79.89  ICD-9-CM: 790.6     Attention deficit disorder (ADD) without hyperactivity     ICD-10-CM: F98.8  ICD-9-CM: 314.00     Panic attack as reaction to stress     ICD-10-CM: F41.0, F43.0  ICD-9-CM: 308.0           PLAN I want the patient to take the rosuvastatin regularly.  He will continue current medicines as now.  I plan on rechecking him in 6 months with a CBC, CMP, lipid panel, uric acid level, TSH and free T4    There are no Patient Instructions on file for this visit.  Return in about 6 months (around 7/19/2023) for with labs.

## 2023-01-31 ENCOUNTER — TELEPHONE (OUTPATIENT)
Dept: SLEEP MEDICINE | Facility: HOSPITAL | Age: 43
End: 2023-01-31
Payer: OTHER GOVERNMENT

## 2023-02-15 DIAGNOSIS — F41.0 PANIC ATTACK AS REACTION TO STRESS: ICD-10-CM

## 2023-02-15 DIAGNOSIS — F43.0 PANIC ATTACK AS REACTION TO STRESS: ICD-10-CM

## 2023-02-16 RX ORDER — ALLOPURINOL 300 MG/1
300 TABLET ORAL DAILY
Qty: 90 TABLET | Refills: 3 | Status: SHIPPED | OUTPATIENT
Start: 2023-02-16

## 2023-02-16 RX ORDER — LOSARTAN POTASSIUM 50 MG/1
50 TABLET ORAL DAILY
Qty: 90 TABLET | Refills: 3 | Status: SHIPPED | OUTPATIENT
Start: 2023-02-16

## 2023-02-16 RX ORDER — DEXTROAMPHETAMINE SACCHARATE, AMPHETAMINE ASPARTATE MONOHYDRATE, DEXTROAMPHETAMINE SULFATE AND AMPHETAMINE SULFATE 2.5; 2.5; 2.5; 2.5 MG/1; MG/1; MG/1; MG/1
10 CAPSULE, EXTENDED RELEASE ORAL 2 TIMES DAILY
Qty: 180 CAPSULE | Refills: 0 | Status: SHIPPED | OUTPATIENT
Start: 2023-02-16

## 2023-02-16 RX ORDER — COLCHICINE 0.6 MG/1
TABLET ORAL
Qty: 30 TABLET | Refills: 5 | Status: SHIPPED | OUTPATIENT
Start: 2023-02-16 | End: 2023-02-22 | Stop reason: SDUPTHER

## 2023-02-16 RX ORDER — ALPRAZOLAM 0.5 MG/1
0.5 TABLET ORAL 3 TIMES DAILY PRN
Qty: 90 TABLET | Refills: 1 | Status: SHIPPED | OUTPATIENT
Start: 2023-02-16 | End: 2023-02-23 | Stop reason: SDUPTHER

## 2023-02-16 RX ORDER — ROSUVASTATIN CALCIUM 5 MG/1
5 TABLET, COATED ORAL DAILY
Qty: 90 TABLET | Refills: 3 | Status: SHIPPED | OUTPATIENT
Start: 2023-02-16

## 2023-02-16 NOTE — TELEPHONE ENCOUNTER
Rx Refill Note  Requested Prescriptions     Pending Prescriptions Disp Refills   • losartan (Cozaar) 50 MG tablet 90 tablet 3     Sig: Take 1 tablet by mouth Daily.   • ALPRAZolam (XANAX) 0.5 MG tablet 90 tablet 1     Sig: Take 1 tablet by mouth 3 (Three) Times a Day As Needed. for anxiety   • amphetamine-dextroamphetamine XR (Adderall XR) 10 MG 24 hr capsule 180 capsule 0     Sig: Take 1 capsule by mouth 2 (Two) Times a Day   • rosuvastatin (Crestor) 5 MG tablet 90 tablet 3     Sig: Take 1 tablet by mouth Daily.   • allopurinol (Zyloprim) 300 MG tablet 90 tablet 3     Sig: Take 1 tablet by mouth Daily.   • colchicine 0.6 MG tablet 30 tablet 5     Sig: TAKE 2 PO WITH GOUT FLARE AND CAN REPEAT IN 6 HOURS IF NEEDED.  CAN TAKE 1 PO DAILY AS NEEDED      Last office visit with prescribing clinician: 1/19/2023   Last telemedicine visit with prescribing clinician: 8/3/2023   Next office visit with prescribing clinician: 8/9/2023                         Would you like a call back once the refill request has been completed: [] Yes [] No    If the office needs to give you a call back, can they leave a voicemail: [] Yes [] No    Kandice Dee MA  02/16/23, 07:56 EST

## 2023-02-21 ENCOUNTER — TELEPHONE (OUTPATIENT)
Dept: FAMILY MEDICINE CLINIC | Facility: CLINIC | Age: 43
End: 2023-02-21

## 2023-02-21 NOTE — TELEPHONE ENCOUNTER
Pharmacy Name:  [facility]    Pharmacy representative name: PAULINE MINOR    Pharmacy representative phone number: [phone]     What medication are you calling in regards to: COLCHICINE 0.6 MG TABLET    What question does the pharmacy have: DIRECTIONS ARE CONFLICTING ON THE MEDICATION    Who is the provider that prescribed the medication: DR HAMILTON    Additional notes: REF # [id number]

## 2023-02-22 RX ORDER — COLCHICINE 0.6 MG/1
TABLET ORAL
Qty: 30 TABLET | Refills: 5 | Status: SHIPPED | OUTPATIENT
Start: 2023-02-22

## 2023-02-22 NOTE — TELEPHONE ENCOUNTER
Rx Refill Note  Requested Prescriptions      No prescriptions requested or ordered in this encounter      Last office visit with prescribing clinician: 1/19/2023   Last telemedicine visit with prescribing clinician: 8/3/2023   Next office visit with prescribing clinician: 8/9/2023                         Would you like a call back once the refill request has been completed: [] Yes [] No    If the office needs to give you a call back, can they leave a voicemail: [] Yes [] No    Kandice Dee MA  02/22/23, 09:53 EST

## 2023-02-23 DIAGNOSIS — F43.0 PANIC ATTACK AS REACTION TO STRESS: ICD-10-CM

## 2023-02-23 DIAGNOSIS — F41.0 PANIC ATTACK AS REACTION TO STRESS: ICD-10-CM

## 2023-02-23 RX ORDER — ALPRAZOLAM 0.5 MG/1
0.5 TABLET ORAL 3 TIMES DAILY PRN
Qty: 90 TABLET | Refills: 1 | Status: SHIPPED | OUTPATIENT
Start: 2023-02-23

## 2023-02-23 NOTE — TELEPHONE ENCOUNTER
Rx Refill Note  Requested Prescriptions      No prescriptions requested or ordered in this encounter      Last office visit with prescribing clinician: 1/19/2023   Last telemedicine visit with prescribing clinician: 8/3/2023   Next office visit with prescribing clinician: 8/9/2023                         Would you like a call back once the refill request has been completed: [] Yes [] No    If the office needs to give you a call back, can they leave a voicemail: [] Yes [] No    Kandice Dee MA  02/23/23, 14:56 EST

## 2023-02-23 NOTE — TELEPHONE ENCOUNTER
[facility] OF [facility], PT REQUEST SEND TO ANOTHER PHARMACY.    ALPRAZolam (XANAX) 0.5 MG tablet        [id number] - [place], [place] - [address] AT [facility] & [address] - [phone]  - [phone] FX Phone:  [phone]   Fax:  [phone]

## 2023-04-25 ENCOUNTER — OFFICE VISIT (OUTPATIENT)
Dept: FAMILY MEDICINE CLINIC | Facility: CLINIC | Age: 43
End: 2023-04-25
Payer: OTHER GOVERNMENT

## 2023-04-25 VITALS
DIASTOLIC BLOOD PRESSURE: 88 MMHG | TEMPERATURE: 97.1 F | HEIGHT: 71 IN | WEIGHT: 199 LBS | OXYGEN SATURATION: 98 % | SYSTOLIC BLOOD PRESSURE: 130 MMHG | BODY MASS INDEX: 27.86 KG/M2 | HEART RATE: 102 BPM | RESPIRATION RATE: 16 BRPM

## 2023-04-25 DIAGNOSIS — M25.521 RIGHT ELBOW PAIN: Primary | ICD-10-CM

## 2023-04-25 PROCEDURE — 99213 OFFICE O/P EST LOW 20 MIN: CPT | Performed by: INTERNAL MEDICINE

## 2023-04-25 NOTE — PROGRESS NOTES
Subjective   Dave Chavez is a 42 y.o. male. Patient is here today for right elbow pain.  He has had this before and saw orthopedics in the past.  It is limiting him in some of the physical activities that he can do because of the pain.  He has been taking 2 Aleve a day.  Chief Complaint   Patient presents with   • Procedure     Elbow pain surgery clearance          Vitals:    23 1507   BP: 130/88   Pulse: 102   Resp: 16   Temp: 97.1 °F (36.2 °C)   SpO2: 98%     Body mass index is 27.77 kg/m².  The following portions of the patient's history were reviewed and updated as appropriate: allergies, current medications, past family history, past medical history, past social history, past surgical history and problem list.    Past Medical History:   Diagnosis Date   • ADHD (attention deficit hyperactivity disorder) 2018    Treated with straterra but medication made me feel nauseous   • Allergic 1991    Currently taking allergy shots 1x/wk for seasonal allergies   • Anxiety 2018    Treated with paxil and resolved around 2018   • Arthritis 2014    Noted in left knee and right large toe   • Depression 2018    Treated with paxil and resolved around 2018   • Hypertension 2015    Never treated with medication   • Irritable bowel syndrome 2018    Prescribed Bentyl   • Kidney stone 2001    Hx of kidney stones.  Treated at [facility].      Allergies   Allergen Reactions   • Penicillins Unknown - Low Severity     childhood      Social History     Socioeconomic History   • Marital status:    Tobacco Use   • Smoking status: Former     Packs/day: 0.50     Years: 10.00     Pack years: 5.00     Types: Cigarettes     Start date: 2002     Quit date: 2016     Years since quittin.8   • Smokeless tobacco: Never   • Tobacco comments:     Quit using lozenges and the -Quit-Now phone line   Substance and Sexual Activity   • Alcohol use: Yes     Alcohol/week:  9.0 standard drinks     Types: 3 Glasses of wine, 6 Cans of beer per week   • Drug use: No   • Sexual activity: Yes     Partners: Female     Birth control/protection: Surgical     Comment: Vasectomy        Current Outpatient Medications:   •  allopurinol (Zyloprim) 300 MG tablet, Take 1 tablet by mouth Daily., Disp: 90 tablet, Rfl: 3  •  ALPRAZolam (XANAX) 0.5 MG tablet, Take 1 tablet by mouth 3 (Three) Times a Day As Needed for Anxiety. for anxiety, Disp: 90 tablet, Rfl: 1  •  amphetamine-dextroamphetamine XR (Adderall XR) 10 MG 24 hr capsule, Take 1 capsule by mouth 2 (Two) Times a Day, Disp: 180 capsule, Rfl: 0  •  colchicine 0.6 MG tablet, TAKE 2 PO WITH GOUT FLARE AND CAN REPEAT IN 6 HOURS IF NEEDED., Disp: 30 tablet, Rfl: 5  •  losartan (Cozaar) 50 MG tablet, Take 1 tablet by mouth Daily., Disp: 90 tablet, Rfl: 3  •  rosuvastatin (Crestor) 5 MG tablet, Take 1 tablet by mouth Daily., Disp: 90 tablet, Rfl: 3     Objective     History of Present Illness     Review of Systems    Physical Exam  Vitals and nursing note reviewed.   Constitutional:       General: He is not in acute distress.     Appearance: Normal appearance. He is not ill-appearing.   HENT:      Head: Normocephalic and atraumatic.   Cardiovascular:      Rate and Rhythm: Normal rate and regular rhythm.      Heart sounds: Normal heart sounds.   Pulmonary:      Effort: Pulmonary effort is normal. No respiratory distress.      Breath sounds: Normal breath sounds. No wheezing or rales.   Musculoskeletal:         General: Tenderness present.      Comments: Right elbow tenderness with stress and palpation   Skin:     General: Skin is warm and dry.   Neurological:      Mental Status: He is alert.   Psychiatric:         Mood and Affect: Mood normal.         Behavior: Behavior normal.         Assessment    ASSESSMENT #1-flare of right elbow tendinitis    Problems Addressed this Visit        Musculoskeletal and Injuries    Right elbow pain - Primary    Relevant  Orders    Ambulatory Referral to Orthopedic Surgery   Diagnoses       Codes Comments    Right elbow pain    -  Primary ICD-10-CM: M25.521  ICD-9-CM: 719.42           PLAN patient can try Aleve up to 2 tablets 3 times a day, preferably with food.  I am referring him to orthopedics, Dr. Jeet Silveira who he has seen before.  He is already scheduled for follow-up with me with labs and will keep that appointment    There are no Patient Instructions on file for this visit.  No follow-ups on file.  Answers for HPI/ROS submitted by the patient on 4/25/2023  What is the primary reason for your visit?: Other  Please describe your symptoms.: Joint pain in right elbow  Have you had these symptoms before?: Yes  How long have you been having these symptoms?: Greater than 2 weeks  Please list any medications you are currently taking for this condition.: Ibuprofen  Please describe any probable cause for these symptoms. : Overuse and history of tendinitis

## 2023-05-15 ENCOUNTER — OFFICE VISIT (OUTPATIENT)
Dept: SLEEP MEDICINE | Facility: HOSPITAL | Age: 43
End: 2023-05-15
Payer: OTHER GOVERNMENT

## 2023-05-15 VITALS
OXYGEN SATURATION: 92 % | HEIGHT: 71 IN | SYSTOLIC BLOOD PRESSURE: 143 MMHG | HEART RATE: 97 BPM | DIASTOLIC BLOOD PRESSURE: 94 MMHG | BODY MASS INDEX: 27.44 KG/M2 | WEIGHT: 196 LBS

## 2023-05-15 DIAGNOSIS — G47.33 OSA ON CPAP: Primary | ICD-10-CM

## 2023-05-15 DIAGNOSIS — Z99.89 OSA ON CPAP: Primary | ICD-10-CM

## 2023-05-15 PROCEDURE — G0463 HOSPITAL OUTPT CLINIC VISIT: HCPCS

## 2023-05-15 RX ORDER — DOXEPIN HYDROCHLORIDE 25 MG/1
25 CAPSULE ORAL NIGHTLY PRN
Qty: 30 CAPSULE | Refills: 5 | Status: SHIPPED | OUTPATIENT
Start: 2023-05-15

## 2023-05-15 NOTE — PROGRESS NOTES
"      [facility]         Dr Obed Watkins  [unfilled]  Patient Care Team:  Jovon Ahumada MD as PCP - General (Internal Medicine)    Chief Complaint: Mild GUMARO AHI 6.5 events per hour    Interval History: Very pleasant gentleman he had a sleep study done with mild GUMARO.  He was set up on auto CPAP 4 to 8 cm compliance today 32% with average daily use of 4 hours 30 minutes.  AHI leak within normal limits.  He tells me he still getting used to the machine and specially the fact that he travels a lot.  When he does travel he is unable to take his CPAP.  Goes to bed 11 gets up 630 gets about 7+ hours of sleep and more rested.  No tobacco no alcohol or caffeine abuse.  Currently has a full facemask DreamWear that fits well.  Supplies have been adequate.    REVIEW OF SYSTEMS:   CARDIOVASCULAR: No chest pain, chest pressure or chest discomfort. No palpitations or edema.   RESPIRATORY: No shortness of breath, cough or sputum.   GASTROINTESTINAL: No anorexia, nausea, vomiting or diarrhea. No abdominal pain or blood.   HEMATOLOGIC: No bleeding or bruising.   Nat[place] 16 out of 24 improving with CPAP  Positive for nasal congestion  Ventilator/Non-Invasive Ventilation Settings (From admission, onward)    None            Vital Signs  Heart Rate:  [97] 97  BP: (143)/(94) 143/94  [unfilled]  Flowsheet Rows    Flowsheet Row First Filed Value   Admission Height 180.3 cm (70.98\") Documented at 05/15/2023 1112   Admission Weight 88.9 kg (196 lb) Documented at 05/15/2023 1112          Physical Exam:  Patient is examined using the personal protective equipment as per guidelines from infection control for this particular patient as enacted.  Hand hygiene was performed before and after patient interaction.   General Appearance:    Alert, cooperative, in no acute distress.  Following simple commands  ENT Mallampati between 3 and 4 no nasal congestion  Neck midline trachea, no thyromegaly   Lungs:     Clear to auscultation, " respirations regular, even and                  unlabored    Heart:    Regular rhythm and normal rate, normal S1 and S2, no            murmur, no gallop, no rub, no click   Chest Wall:    No abnormalities observed   Abdomen:     Normal bowel sounds, no masses, no organomegaly, soft        nontender, nondistended, no guarding, no rebound                tenderness   Extremities:   Moves all extremities well, no edema, no cyanosis, no             redness  CNS no focal neurological deficits normal sensory exam  Skin no rashes no nodules  Musculoskeletal no cyanosis no clubbing normal range of motion     Results Review:                                          I reviewed the patient's new clinical results.  I personally viewed and interpreted the patient's chest x-ray.        Medication Review:       No current facility-administered medications for this visit.      ASSESSMENT:   Mild GUMARO  Insomnia unspecified        PLAN:  I reviewed his sleep study result in detail with the patient.  Mild GUMARO with symptoms.  Compliance download was discussed with the patient  Needs to improve compliance.  Will add doxepin 25 mg at bedtime  Sleep hygiene measures  Continue current auto CPAP 4 to 8 cm  Importance of using the CPAP  Side effects of doxepin including residual hypersomnia in the morning was discussed  We will follow-up in 6 months to review download  Obed Watkins MD  05/15/23  11:34 EDT

## 2023-06-05 ENCOUNTER — OFFICE VISIT (OUTPATIENT)
Dept: ORTHOPEDIC SURGERY | Facility: CLINIC | Age: 43
End: 2023-06-05
Payer: OTHER GOVERNMENT

## 2023-06-05 VITALS — WEIGHT: 194.1 LBS | BODY MASS INDEX: 25.72 KG/M2 | TEMPERATURE: 97.3 F | HEIGHT: 73 IN

## 2023-06-05 DIAGNOSIS — M25.521 RIGHT ELBOW PAIN: Primary | ICD-10-CM

## 2023-06-05 NOTE — PROGRESS NOTES
Patient:Dave Chavez    YOB: 1980    Medical Record Number:[id number]    Chief Complaints:  Right elbow pain    History of Present Illness:     42 y.o. male patient who presents for his right elbow.  I saw him for the same issue almost exactly 3 years ago.  He tells me that did great after I last saw him.  His symptoms completely resolved with PT.  About a month and a half ago the symptoms flared up again.  He was having lateral sided elbow pain.  Those symptoms are now completely resolved.  He says the elbow feels like it is back to normal at this point.    Allergies:  Allergies   Allergen Reactions    Penicillins Unknown - Low Severity     childhood       Home Medications:    Current Outpatient Medications:     allopurinol (Zyloprim) 300 MG tablet, Take 1 tablet by mouth Daily., Disp: 90 tablet, Rfl: 3    ALPRAZolam (XANAX) 0.5 MG tablet, Take 1 tablet by mouth 3 (Three) Times a Day As Needed for Anxiety. for anxiety, Disp: 90 tablet, Rfl: 1    amphetamine-dextroamphetamine XR (Adderall XR) 10 MG 24 hr capsule, Take 1 capsule by mouth 2 (Two) Times a Day, Disp: 180 capsule, Rfl: 0    colchicine 0.6 MG tablet, TAKE 2 PO WITH GOUT FLARE AND CAN REPEAT IN 6 HOURS IF NEEDED., Disp: 30 tablet, Rfl: 5    doxepin (SINEquan) 25 MG capsule, Take 1 capsule by mouth At Night As Needed for Sleep., Disp: 30 capsule, Rfl: 5    losartan (Cozaar) 50 MG tablet, Take 1 tablet by mouth Daily., Disp: 90 tablet, Rfl: 3    rosuvastatin (Crestor) 5 MG tablet, Take 1 tablet by mouth Daily., Disp: 90 tablet, Rfl: 3    Past Medical History:   Diagnosis Date    ADHD (attention deficit hyperactivity disorder) 7/25/2018    Treated with straterra but medication made me feel nauseous    Allergic 01/01/1991    Currently taking allergy shots 1x/wk for seasonal allergies    Anxiety 02/01/2018    Treated with paxil and resolved around 5/1/2018    Arthritis 01/01/2014    Noted in left knee and right large toe    Depression 02/01/2018     Treated with paxil and resolved around 2018    Hypertension 2015    Never treated with medication    Irritable bowel syndrome 2018    Prescribed Bentyl    Kidney stone 2001    Hx of kidney stones.  Treated at [facility].       Past Surgical History:   Procedure Laterality Date    ADENOIDECTOMY  2017    KIDNEY STONE SURGERY      TONSILLECTOMY      VASECTOMY         Social History     Occupational History    Not on file   Tobacco Use    Smoking status: Former     Packs/day: 0.50     Years: 10.00     Pack years: 5.00     Types: Cigarettes     Start date: 2002     Quit date: 2016     Years since quittin.9    Smokeless tobacco: Never    Tobacco comments:     Quit using lozenges and the [id number] phone line   Substance and Sexual Activity    Alcohol use: Yes     Alcohol/week: 9.0 standard drinks     Types: 3 Glasses of wine, 6 Cans of beer per week    Drug use: No    Sexual activity: Yes     Partners: Female     Birth control/protection: Surgical     Comment: Vasectomy      Social History     Social History Narrative    Not on file       Family History   Problem Relation Age of Onset    Anxiety disorder Mother     Arthritis Father     Hyperlipidemia Father     Arthritis Paternal Grandmother     Hyperlipidemia Paternal Grandmother        Review of Systems:      Constitutional: Denies fever, shaking or chills   Eyes: Denies change in visual acuity   HEENT: Denies nasal congestion or sore throat   Respiratory: Denies cough or shortness of breath   Cardiovascular: Denies chest pain or edema  Endocrine: Denies tremors, palpitations, intolerance of heat or cold, polyuria, polydipsia.  GI: Denies abdominal pain, nausea, vomiting, bloody stools or diarrhea  : Denies frequency, urgency, incontinence, retention, or nocturia.  Musculoskeletal: Denies numbness, tingling or loss of motor function except as above  Integument: Denies rash, lesion or ulceration   Neurologic: Denies  "headache or focal weakness, deficits  Heme: Denies spontaneous or excessive bleeding, epistaxis, hematuria, melena, fatigue, enlarged or tender lymph nodes.      All other pertinent positives and negatives as noted above in HPI.    Physical Exam:42 y.o. male  Vitals:    06/05/23 1520   Temp: 97.3 °F (36.3 °C)   TempSrc: Temporal   Weight: 88 kg (194 lb 1.6 oz)   Height: 185.4 cm (73\")       General:  Patient is awake and alert.  Appears in no acute distress or discomfort.    Psych:  Affect and demeanor are appropriate.    Extremities:  Right elbow:  Skin is benign.  No gross abnormalities on inspection.  No palpable masses or adenopathy.  No effusion.  No significant tenderness.  Full symmetric motion.  No instability.  Good strength with elbow flexion and extension as well as resisted forearm pronation and supination.  Sensation is intact.  Brisk capillary refill in the fingers with good skin turgor.     Imaging:  AP and lateral views of the right elbow are ordered by myself and reviewed to evaluate the patient's complaint.  No comparison films are immediately available.  The x-rays show no obvious acute abnormalities, lesions, masses, significant degenerative changes, or other concerning findings.     Assessment/Plan: Right elbow pain, resolved    His exam is totally benign.  I suspect he probably had a flareup of the lateral epicondylitis but obviously he seems to be doing fine now.  I told him to call me if it flares up again.    Kleber Babcock MD    06/05/2023  "

## 2023-06-11 DIAGNOSIS — F43.0 PANIC ATTACK AS REACTION TO STRESS: ICD-10-CM

## 2023-06-11 DIAGNOSIS — F41.0 PANIC ATTACK AS REACTION TO STRESS: ICD-10-CM

## 2023-06-12 NOTE — TELEPHONE ENCOUNTER
Rx Refill Note  Requested Prescriptions     Pending Prescriptions Disp Refills    ALPRAZolam (XANAX) 0.5 MG tablet 90 tablet 1     Sig: Take 1 tablet by mouth 3 (Three) Times a Day As Needed for Anxiety. for anxiety      Last office visit with prescribing clinician: 4/25/2023   Last telemedicine visit with prescribing clinician: Visit date not found   Next office visit with prescribing clinician: 8/9/2023                         Would you like a call back once the refill request has been completed: [] Yes [] No    If the office needs to give you a call back, can they leave a voicemail: [] Yes [] No    Nighat Del Valle, PCT  06/12/23, 13:13 EDT

## 2023-06-13 RX ORDER — ALPRAZOLAM 0.5 MG/1
0.5 TABLET ORAL 3 TIMES DAILY PRN
Qty: 90 TABLET | Refills: 1 | Status: SHIPPED | OUTPATIENT
Start: 2023-06-13

## 2023-07-31 ENCOUNTER — TELEPHONE (OUTPATIENT)
Dept: FAMILY MEDICINE CLINIC | Facility: CLINIC | Age: 43
End: 2023-07-31
Payer: OTHER GOVERNMENT

## 2023-08-03 DIAGNOSIS — Z13.29 SCREENING FOR THYROID DISORDER: ICD-10-CM

## 2023-08-03 DIAGNOSIS — R73.9 ELEVATED BLOOD SUGAR: ICD-10-CM

## 2023-08-03 DIAGNOSIS — I10 PRIMARY HYPERTENSION: Primary | ICD-10-CM

## 2023-08-03 DIAGNOSIS — E78.49 OTHER HYPERLIPIDEMIA: ICD-10-CM

## 2023-08-04 LAB
ALBUMIN SERPL-MCNC: 4.8 G/DL (ref 3.5–5.2)
ALBUMIN/GLOB SERPL: 2.2 G/DL
ALP SERPL-CCNC: 80 U/L (ref 39–117)
ALT SERPL-CCNC: 61 U/L (ref 1–41)
AST SERPL-CCNC: 38 U/L (ref 1–40)
BASOPHILS # BLD AUTO: 0.04 10*3/MM3 (ref 0–0.2)
BASOPHILS NFR BLD AUTO: 0.7 % (ref 0–1.5)
BILIRUB SERPL-MCNC: 0.9 MG/DL (ref 0–1.2)
BUN SERPL-MCNC: 11 MG/DL (ref 6–20)
BUN/CREAT SERPL: 9.6 (ref 7–25)
CALCIUM SERPL-MCNC: 10 MG/DL (ref 8.6–10.5)
CHLORIDE SERPL-SCNC: 99 MMOL/L (ref 98–107)
CHOLEST SERPL-MCNC: 229 MG/DL (ref 0–200)
CO2 SERPL-SCNC: 26.1 MMOL/L (ref 22–29)
CREAT SERPL-MCNC: 1.15 MG/DL (ref 0.76–1.27)
EGFRCR SERPLBLD CKD-EPI 2021: 82 ML/MIN/1.73
EOSINOPHIL # BLD AUTO: 0.11 10*3/MM3 (ref 0–0.4)
EOSINOPHIL NFR BLD AUTO: 1.9 % (ref 0.3–6.2)
ERYTHROCYTE [DISTWIDTH] IN BLOOD BY AUTOMATED COUNT: 13.2 % (ref 12.3–15.4)
GLOBULIN SER CALC-MCNC: 2.2 GM/DL
GLUCOSE SERPL-MCNC: 99 MG/DL (ref 65–99)
HCT VFR BLD AUTO: 44.5 % (ref 37.5–51)
HDLC SERPL-MCNC: 92 MG/DL (ref 40–60)
HGB BLD-MCNC: 15.5 G/DL (ref 13–17.7)
IMM GRANULOCYTES # BLD AUTO: 0.03 10*3/MM3 (ref 0–0.05)
IMM GRANULOCYTES NFR BLD AUTO: 0.5 % (ref 0–0.5)
LDLC SERPL CALC-MCNC: 102 MG/DL (ref 0–100)
LDLC/HDLC SERPL: 1.04 {RATIO}
LYMPHOCYTES # BLD AUTO: 1.65 10*3/MM3 (ref 0.7–3.1)
LYMPHOCYTES NFR BLD AUTO: 28.1 % (ref 19.6–45.3)
MCH RBC QN AUTO: 31.4 PG (ref 26.6–33)
MCHC RBC AUTO-ENTMCNC: 34.8 G/DL (ref 31.5–35.7)
MCV RBC AUTO: 90.3 FL (ref 79–97)
MONOCYTES # BLD AUTO: 0.49 10*3/MM3 (ref 0.1–0.9)
MONOCYTES NFR BLD AUTO: 8.3 % (ref 5–12)
NEUTROPHILS # BLD AUTO: 3.56 10*3/MM3 (ref 1.7–7)
NEUTROPHILS NFR BLD AUTO: 60.5 % (ref 42.7–76)
NRBC BLD AUTO-RTO: 0 /100 WBC (ref 0–0.2)
PLATELET # BLD AUTO: 249 10*3/MM3 (ref 140–450)
POTASSIUM SERPL-SCNC: 4.4 MMOL/L (ref 3.5–5.2)
PROT SERPL-MCNC: 7 G/DL (ref 6–8.5)
RBC # BLD AUTO: 4.93 10*6/MM3 (ref 4.14–5.8)
SODIUM SERPL-SCNC: 138 MMOL/L (ref 136–145)
TRIGL SERPL-MCNC: 208 MG/DL (ref 0–150)
TSH SERPL DL<=0.005 MIU/L-ACNC: 4.02 UIU/ML (ref 0.27–4.2)
URATE SERPL-MCNC: 7.9 MG/DL (ref 3.4–7)
VLDLC SERPL CALC-MCNC: 35 MG/DL (ref 5–40)
WBC # BLD AUTO: 5.88 10*3/MM3 (ref 3.4–10.8)

## 2023-09-05 DIAGNOSIS — F41.0 PANIC ATTACK AS REACTION TO STRESS: ICD-10-CM

## 2023-09-05 DIAGNOSIS — F43.0 PANIC ATTACK AS REACTION TO STRESS: ICD-10-CM

## 2023-09-06 RX ORDER — DEXTROAMPHETAMINE SACCHARATE, AMPHETAMINE ASPARTATE MONOHYDRATE, DEXTROAMPHETAMINE SULFATE AND AMPHETAMINE SULFATE 2.5; 2.5; 2.5; 2.5 MG/1; MG/1; MG/1; MG/1
10 CAPSULE, EXTENDED RELEASE ORAL 2 TIMES DAILY
Qty: 180 CAPSULE | Refills: 0 | Status: SHIPPED | OUTPATIENT
Start: 2023-09-06

## 2023-09-08 ENCOUNTER — TELEPHONE (OUTPATIENT)
Dept: FAMILY MEDICINE CLINIC | Facility: CLINIC | Age: 43
End: 2023-09-08
Payer: OTHER GOVERNMENT

## 2023-09-08 NOTE — TELEPHONE ENCOUNTER
Pt informed to contact local pharmacy to find out who has adderall in stock. [facility] said they are out and suggested an alternative.    Patient is going to try local pharmacies but he said it's no rush, he is fine until Tuesday

## 2023-09-15 ENCOUNTER — TELEPHONE (OUTPATIENT)
Dept: FAMILY MEDICINE CLINIC | Facility: CLINIC | Age: 43
End: 2023-09-15
Payer: OTHER GOVERNMENT

## 2023-09-15 DIAGNOSIS — F43.0 PANIC ATTACK AS REACTION TO STRESS: ICD-10-CM

## 2023-09-15 DIAGNOSIS — F41.0 PANIC ATTACK AS REACTION TO STRESS: ICD-10-CM

## 2023-09-15 RX ORDER — DEXTROAMPHETAMINE SACCHARATE, AMPHETAMINE ASPARTATE MONOHYDRATE, DEXTROAMPHETAMINE SULFATE AND AMPHETAMINE SULFATE 2.5; 2.5; 2.5; 2.5 MG/1; MG/1; MG/1; MG/1
10 CAPSULE, EXTENDED RELEASE ORAL 2 TIMES DAILY
Qty: 180 CAPSULE | Refills: 0 | Status: SHIPPED | OUTPATIENT
Start: 2023-09-15

## 2023-09-15 NOTE — TELEPHONE ENCOUNTER
approved medication 9/6/23 but his regular pharmacy is out of stock. Will you please approve for one time refill at [facility] that does have in stock?      Pharmacy added to his chart.

## 2023-09-15 NOTE — TELEPHONE ENCOUNTER
Pharmacy Name: [facility]Y #[id number] - [place], [place] - [address] OF [address] - [phone] ph - [phone] FX     What medication are you calling in regards to:   amphetamine-dextroamphetamine XR (Adderall XR) 10     What question does the pharmacy have: PLEASE TRANSFER TO [facility]. [facility] IS OUT OF STOCK

## 2023-10-02 DIAGNOSIS — F43.0 PANIC ATTACK AS REACTION TO STRESS: ICD-10-CM

## 2023-10-02 DIAGNOSIS — F41.0 PANIC ATTACK AS REACTION TO STRESS: ICD-10-CM

## 2023-10-02 RX ORDER — ALPRAZOLAM 0.5 MG/1
0.5 TABLET ORAL 3 TIMES DAILY PRN
Qty: 90 TABLET | Refills: 1 | Status: SHIPPED | OUTPATIENT
Start: 2023-10-02

## 2023-10-02 NOTE — TELEPHONE ENCOUNTER
Rx Refill Note  Requested Prescriptions     Pending Prescriptions Disp Refills    ALPRAZolam (XANAX) 0.5 MG tablet 90 tablet 1     Sig: Take 1 tablet by mouth 3 (Three) Times a Day As Needed for Anxiety. for anxiety      Last office visit with prescribing clinician: 4/25/2023   Last telemedicine visit with prescribing clinician: Visit date not found   Next office visit with prescribing clinician: Visit date not found                         Would you like a call back once the refill request has been completed: [] Yes [] No    If the office needs to give you a call back, can they leave a voicemail: [] Yes [] No    Ovidio Banks MA  10/02/23, 07:54 EDT

## 2023-12-27 DIAGNOSIS — F43.0 PANIC ATTACK AS REACTION TO STRESS: ICD-10-CM

## 2023-12-27 DIAGNOSIS — F41.0 PANIC ATTACK AS REACTION TO STRESS: ICD-10-CM

## 2023-12-28 ENCOUNTER — TELEPHONE (OUTPATIENT)
Dept: FAMILY MEDICINE CLINIC | Facility: CLINIC | Age: 43
End: 2023-12-28
Payer: OTHER GOVERNMENT

## 2023-12-28 RX ORDER — ALPRAZOLAM 0.5 MG/1
0.5 TABLET ORAL 3 TIMES DAILY PRN
Qty: 90 TABLET | Refills: 0 | Status: SHIPPED | OUTPATIENT
Start: 2023-12-28

## 2023-12-28 NOTE — TELEPHONE ENCOUNTER
Hub to relay: medication xanax is being requested for refill. Dr. Ahumada retired and will need to make an appointment with a new PCP to get establish in order to continue getting refills. Request for refill will be sent to the on call

## 2023-12-28 NOTE — TELEPHONE ENCOUNTER
Rx Refill Note  Requested Prescriptions     Pending Prescriptions Disp Refills    ALPRAZolam (XANAX) 0.5 MG tablet 90 tablet 1     Sig: Take 1 tablet by mouth 3 (Three) Times a Day As Needed for Anxiety. for anxiety      Last office visit with prescribing clinician: Visit date not found   Last telemedicine visit with prescribing clinician: Visit date not found   Next office visit with prescribing clinician: Visit date not found                         Would you like a call back once the refill request has been completed: [] Yes [] No    If the office needs to give you a call back, can they leave a voicemail: [] Yes [] No    Faviola Alexandre MA  12/28/23, 10:57 EST

## 2024-01-26 ENCOUNTER — OFFICE VISIT (OUTPATIENT)
Dept: FAMILY MEDICINE CLINIC | Facility: CLINIC | Age: 44
End: 2024-01-26
Payer: OTHER GOVERNMENT

## 2024-01-26 VITALS
WEIGHT: 206 LBS | OXYGEN SATURATION: 98 % | HEIGHT: 73 IN | TEMPERATURE: 98.1 F | DIASTOLIC BLOOD PRESSURE: 86 MMHG | HEART RATE: 94 BPM | SYSTOLIC BLOOD PRESSURE: 122 MMHG | BODY MASS INDEX: 27.3 KG/M2

## 2024-01-26 DIAGNOSIS — Z90.49 S/P LAPAROSCOPIC CHOLECYSTECTOMY: ICD-10-CM

## 2024-01-26 DIAGNOSIS — E78.2 MIXED HYPERLIPIDEMIA: ICD-10-CM

## 2024-01-26 DIAGNOSIS — F43.0 PANIC ATTACK AS REACTION TO STRESS: ICD-10-CM

## 2024-01-26 DIAGNOSIS — R73.01 IFG (IMPAIRED FASTING GLUCOSE): ICD-10-CM

## 2024-01-26 DIAGNOSIS — F98.8 ATTENTION DEFICIT DISORDER (ADD) WITHOUT HYPERACTIVITY: ICD-10-CM

## 2024-01-26 DIAGNOSIS — M10.9 ACUTE GOUT INVOLVING TOE OF RIGHT FOOT, UNSPECIFIED CAUSE: Primary | ICD-10-CM

## 2024-01-26 DIAGNOSIS — I10 PRIMARY HYPERTENSION: ICD-10-CM

## 2024-01-26 DIAGNOSIS — Z79.899 HIGH RISK MEDICATIONS (NOT ANTICOAGULANTS) LONG-TERM USE: ICD-10-CM

## 2024-01-26 DIAGNOSIS — F41.0 PANIC ATTACK AS REACTION TO STRESS: ICD-10-CM

## 2024-01-26 PROBLEM — M25.521 RIGHT ELBOW PAIN: Status: RESOLVED | Noted: 2020-02-14 | Resolved: 2024-01-26

## 2024-01-26 PROBLEM — R79.89 ABNORMAL LFTS (LIVER FUNCTION TESTS): Status: RESOLVED | Noted: 2019-01-15 | Resolved: 2024-01-26

## 2024-01-26 PROCEDURE — 99214 OFFICE O/P EST MOD 30 MIN: CPT | Performed by: FAMILY MEDICINE

## 2024-01-26 RX ORDER — DEXTROAMPHETAMINE SACCHARATE, AMPHETAMINE ASPARTATE MONOHYDRATE, DEXTROAMPHETAMINE SULFATE AND AMPHETAMINE SULFATE 2.5; 2.5; 2.5; 2.5 MG/1; MG/1; MG/1; MG/1
10 CAPSULE, EXTENDED RELEASE ORAL EVERY MORNING
Qty: 30 CAPSULE | Refills: 0 | Status: SHIPPED | OUTPATIENT
Start: 2024-01-26

## 2024-01-26 RX ORDER — ALPRAZOLAM 0.5 MG/1
0.5 TABLET ORAL NIGHTLY PRN
Status: SHIPPED
Start: 2024-01-26

## 2024-01-26 RX ORDER — LOSARTAN POTASSIUM 50 MG/1
50 TABLET ORAL DAILY
Qty: 90 TABLET | Refills: 3 | Status: SHIPPED | OUTPATIENT
Start: 2024-01-26

## 2024-01-26 RX ORDER — CHOLESTYRAMINE 4 G/9G
1 POWDER, FOR SUSPENSION ORAL
Qty: 90 EACH | Refills: 0 | Status: SHIPPED | OUTPATIENT
Start: 2024-01-26

## 2024-01-26 RX ORDER — EPINEPHRINE 0.3 MG/.3ML
0.3 INJECTION SUBCUTANEOUS ONCE
COMMUNITY
Start: 2023-10-11

## 2024-01-26 RX ORDER — ROSUVASTATIN CALCIUM 5 MG/1
5 TABLET, COATED ORAL DAILY
Qty: 90 TABLET | Refills: 3 | Status: SHIPPED | OUTPATIENT
Start: 2024-01-26

## 2024-01-26 RX ORDER — HYDROXYZINE HYDROCHLORIDE 10 MG/1
10 TABLET, FILM COATED ORAL NIGHTLY
COMMUNITY
Start: 2023-11-08

## 2024-01-26 RX ORDER — ALLOPURINOL 300 MG/1
300 TABLET ORAL DAILY
Qty: 90 TABLET | Refills: 3 | Status: SHIPPED | OUTPATIENT
Start: 2024-01-26

## 2024-01-26 NOTE — PROGRESS NOTES
"  Subjective   Dave Chavez is a 43 y.o. male who is here for   Chief Complaint   Patient presents with    Establish Care   .     History of Present Illness     BRETT is a new patient here today.  His previous family doctor, Dr. Escalona has retired.  BRETT works for the [employer] and is a .    Had his gallbladder taken out last year.  Is having some increased loose stools.  We discussed bile induced loose stools.  He does lots of traveling, it is quite inconvenient.  Will start cholestyramine today    Lifelong anxiety.  Takes as needed Xanax.  Perhaps 1 Xanax a week.  We will update his prescription to reflect that  He was seeing mental health provider.  They tried several medications including doxepin Wellbutrin and BuSpar.  Always did not help or had too many side effects    Longstanding ADD.  He takes Adderall XR 10 mg in the morning.  Is stable.    Carries a diagnosis of gout.  No recent gout attacks    Blood pressure well-controlled    Cholesterol well-controlled      The following portions of the patient's history were reviewed and updated as appropriate: allergies, current medications, past family history, past medical history, past social history, past surgical history, and problem list.    Review of Systems    Objective   Vitals:    01/26/24 0852   BP: 122/86   BP Location: Left arm   Patient Position: Sitting   Cuff Size: Adult   Pulse: 94   Temp: 98.1 °F (36.7 °C)   SpO2: 98%   Weight: 93.4 kg (206 lb)   Height: 185.4 cm (73\")      Physical Exam  Vitals reviewed.   Neurological:      Mental Status: He is alert.   Psychiatric:         Mood and Affect: Mood normal.         Assessment & Plan   Diagnoses and all orders for this visit:    1. Acute gout involving toe of right foot, unspecified cause (Primary)  -     allopurinol (Zyloprim) 300 MG tablet; Take 1 tablet by mouth Daily. Indications: Gout  Dispense: 90 tablet; Refill: 3  -     Uric Acid; Future    2. Primary hypertension  -     " losartan (Cozaar) 50 MG tablet; Take 1 tablet by mouth Daily. Indications: High Blood Pressure Disorder  Dispense: 90 tablet; Refill: 3  -     CBC (No Diff); Future  -     Comprehensive Metabolic Panel; Future  -     Testosterone, Free, Total; Future  -     TSH Rfx On Abnormal To Free T4; Future  -     Urinalysis With Microscopic If Indicated (No Culture) - Urine, Clean Catch; Future    3. Mixed hyperlipidemia  -     rosuvastatin (Crestor) 5 MG tablet; Take 1 tablet by mouth Daily. Indications: High Amount of Fats in the Blood  Dispense: 90 tablet; Refill: 3  -     Lipid Panel; Future    4. Panic attack as reaction to stress  -     ALPRAZolam (XANAX) 0.5 MG tablet; Take 1 tablet by mouth At Night As Needed for Anxiety. for anxiety    5. Attention deficit disorder (ADD) without hyperactivity  -     amphetamine-dextroamphetamine XR (Adderall XR) 10 MG 24 hr capsule; Take 1 capsule by mouth Every Morning Indications: Attention Deficit Hyperactivity Disorder  Dispense: 30 capsule; Refill: 0    6. High risk medications (not anticoagulants) long-term use  -     Compliance Drug Analysis, Ur - Urine, Clean Catch; Future    7. IFG (impaired fasting glucose)  -     Hemoglobin A1c; Future    8. S/P laparoscopic cholecystectomy  -     cholestyramine (Questran) 4 g packet; Take 1 packet by mouth Daily With Lunch. Indications: Diarrhea due to Bile Acids  Dispense: 90 each; Refill: 0        Patient has been prescribed controlled medications.  Treatment discussed  HALLE updated and reviewed.  PDMP also reviewed and marked as reviewed.  Risk and Benefits discussed.  Per [place].    There are no Patient Instructions on file for this visit.    Medications Discontinued During This Encounter   Medication Reason    doxepin (SINEquan) 25 MG capsule Side effects    losartan (Cozaar) 50 MG tablet Reorder    rosuvastatin (Crestor) 5 MG tablet Reorder    allopurinol (Zyloprim) 300 MG tablet Reorder    ALPRAZolam (XANAX) 0.5 MG tablet      amphetamine-dextroamphetamine XR (Adderall XR) 10 MG 24 hr capsule Reorder        Return in about 4 months (around 5/26/2024) for controlled medication.    Dr. Gaurav Cleaning  [place], [place]

## 2024-02-14 ENCOUNTER — OFFICE VISIT (OUTPATIENT)
Dept: FAMILY MEDICINE CLINIC | Facility: CLINIC | Age: 44
End: 2024-02-14
Payer: OTHER GOVERNMENT

## 2024-02-14 VITALS
SYSTOLIC BLOOD PRESSURE: 138 MMHG | TEMPERATURE: 97.8 F | HEIGHT: 73 IN | DIASTOLIC BLOOD PRESSURE: 82 MMHG | HEART RATE: 102 BPM | WEIGHT: 203 LBS | OXYGEN SATURATION: 98 % | BODY MASS INDEX: 26.9 KG/M2

## 2024-02-14 DIAGNOSIS — R79.89 LOW TESTOSTERONE IN MALE: ICD-10-CM

## 2024-02-14 DIAGNOSIS — F51.01 PRIMARY INSOMNIA: ICD-10-CM

## 2024-02-14 DIAGNOSIS — E78.2 MIXED HYPERLIPIDEMIA: Primary | ICD-10-CM

## 2024-02-14 DIAGNOSIS — Z79.899 HIGH RISK MEDICATIONS (NOT ANTICOAGULANTS) LONG-TERM USE: ICD-10-CM

## 2024-02-14 DIAGNOSIS — R79.89 ELEVATED LIVER FUNCTION TESTS: ICD-10-CM

## 2024-02-14 PROBLEM — F40.298 NEEDLE PHOBIA: Status: ACTIVE | Noted: 2024-02-14

## 2024-02-14 PROCEDURE — 99214 OFFICE O/P EST MOD 30 MIN: CPT | Performed by: FAMILY MEDICINE

## 2024-02-14 RX ORDER — ROSUVASTATIN CALCIUM 10 MG/1
10 TABLET, COATED ORAL DAILY
Qty: 90 TABLET | Refills: 3 | Status: SHIPPED | OUTPATIENT
Start: 2024-02-14

## 2024-02-14 RX ORDER — TESTOSTERONE GEL, 1% 10 MG/G
50 GEL TRANSDERMAL DAILY
Qty: 30 EACH | Refills: 5 | Status: SHIPPED | OUTPATIENT
Start: 2024-02-14 | End: 2024-08-12

## 2024-02-14 RX ORDER — HYDROXYZINE HYDROCHLORIDE 10 MG/1
10 TABLET, FILM COATED ORAL NIGHTLY
Qty: 90 TABLET | Refills: 3 | Status: SHIPPED | OUTPATIENT
Start: 2024-02-14

## 2024-03-01 DIAGNOSIS — R79.89 LOW TESTOSTERONE IN MALE: ICD-10-CM

## 2024-03-01 RX ORDER — TESTOSTERONE GEL, 1% 10 MG/G
50 GEL TRANSDERMAL DAILY
Qty: 30 EACH | Refills: 5 | Status: SHIPPED | OUTPATIENT
Start: 2024-03-01 | End: 2024-08-28

## 2024-03-01 NOTE — TELEPHONE ENCOUNTER
Patient Comment: Is it possible to transfer this prescription to [facility]? I spoke to [facility] and they are having trouble obtaining this specific item and they recommended I try a different pharmacy. Thank you!

## 2024-04-12 ENCOUNTER — OFFICE VISIT (OUTPATIENT)
Dept: FAMILY MEDICINE CLINIC | Facility: CLINIC | Age: 44
End: 2024-04-12
Payer: OTHER GOVERNMENT

## 2024-04-12 VITALS
SYSTOLIC BLOOD PRESSURE: 136 MMHG | DIASTOLIC BLOOD PRESSURE: 92 MMHG | HEART RATE: 100 BPM | TEMPERATURE: 97.8 F | WEIGHT: 199 LBS | BODY MASS INDEX: 26.37 KG/M2 | HEIGHT: 73 IN | OXYGEN SATURATION: 97 %

## 2024-04-12 DIAGNOSIS — M10.072 ACUTE IDIOPATHIC GOUT INVOLVING TOE OF LEFT FOOT: Primary | ICD-10-CM

## 2024-04-12 PROCEDURE — 99213 OFFICE O/P EST LOW 20 MIN: CPT | Performed by: FAMILY MEDICINE

## 2024-04-12 RX ORDER — PREDNISONE 10 MG/1
10 TABLET ORAL 3 TIMES DAILY
Qty: 21 TABLET | Refills: 0 | Status: SHIPPED | OUTPATIENT
Start: 2024-04-12

## 2024-04-12 NOTE — PROGRESS NOTES
"  Subjective   Dave Chavez is a 43 y.o. male who is here for   Chief Complaint   Patient presents with    Gout   .   Answers submitted by the patient for this visit:  Primary Reason for Visit (Submitted on 4/12/2024)  What is the primary reason for your visit?: Extremity Pain  Lower Extremity Injury Questionnaire (Submitted on 4/12/2024)  Chief Complaint: Extremity pain  Injury: No    History of Present Illness     Red tender swollen left great toe consistent with gout.  May go on for several weeks.  Taking colchicine and doubling up on his allopurinol  He read somewhere that Crestor may cause gout attacks    The following portions of the patient's history were reviewed and updated as appropriate: allergies, current medications, past family history, past medical history, past social history, past surgical history, and problem list.    Review of Systems    Objective   Vitals:    04/12/24 1018   BP: 136/92   BP Location: Right arm   Patient Position: Sitting   Cuff Size: Adult   Pulse: 100   Temp: 97.8 °F (36.6 °C)   TempSrc: Temporal   SpO2: 97%   Weight: 90.3 kg (199 lb)   Height: 185.4 cm (72.99\")      Physical Exam  Left great toe red swollen tender    Assessment & Plan   Diagnoses and all orders for this visit:    1. Acute idiopathic gout involving toe of left foot (Primary)  -     predniSONE (DELTASONE) 10 MG tablet; Take 1 tablet by mouth 3 (Three) Times a Day.  Dispense: 21 tablet; Refill: 0    Explained he needs to hold the allopurinol during an acute gout attack.    Colchicine 2 times a day  Okay to use Tylenol  Will send in prednisone x 1 week  Once gout attack resolves, restart allopurinol    Print out gout friendly diet, low purine diet    There are no Patient Instructions on file for this visit.    There are no discontinued medications.     No follow-ups on file.    Dr. Gaurav Cleaning  [place], [place]    "

## 2024-04-30 DIAGNOSIS — Z90.49 S/P LAPAROSCOPIC CHOLECYSTECTOMY: ICD-10-CM

## 2024-04-30 RX ORDER — CHOLESTYRAMINE 4 G/9G
1 POWDER, FOR SUSPENSION ORAL
Qty: 90 PACKET | Refills: 3 | Status: SHIPPED | OUTPATIENT
Start: 2024-04-30

## 2024-05-03 DIAGNOSIS — F41.0 PANIC ATTACK AS REACTION TO STRESS: ICD-10-CM

## 2024-05-03 DIAGNOSIS — F98.8 ATTENTION DEFICIT DISORDER (ADD) WITHOUT HYPERACTIVITY: ICD-10-CM

## 2024-05-03 DIAGNOSIS — F43.0 PANIC ATTACK AS REACTION TO STRESS: ICD-10-CM

## 2024-05-03 RX ORDER — ALPRAZOLAM 0.5 MG/1
TABLET ORAL
Qty: 90 TABLET | OUTPATIENT
Start: 2024-05-03

## 2024-05-03 RX ORDER — DEXTROAMPHETAMINE SACCHARATE, AMPHETAMINE ASPARTATE MONOHYDRATE, DEXTROAMPHETAMINE SULFATE AND AMPHETAMINE SULFATE 2.5; 2.5; 2.5; 2.5 MG/1; MG/1; MG/1; MG/1
10 CAPSULE, EXTENDED RELEASE ORAL EVERY MORNING
Qty: 30 CAPSULE | Refills: 0 | Status: SHIPPED | OUTPATIENT
Start: 2024-05-03

## 2024-06-07 ENCOUNTER — OFFICE VISIT (OUTPATIENT)
Dept: FAMILY MEDICINE CLINIC | Facility: CLINIC | Age: 44
End: 2024-06-07
Payer: OTHER GOVERNMENT

## 2024-06-07 VITALS
BODY MASS INDEX: 26.11 KG/M2 | SYSTOLIC BLOOD PRESSURE: 118 MMHG | TEMPERATURE: 97.5 F | DIASTOLIC BLOOD PRESSURE: 88 MMHG | OXYGEN SATURATION: 97 % | HEART RATE: 102 BPM | WEIGHT: 197 LBS | HEIGHT: 73 IN

## 2024-06-07 DIAGNOSIS — E78.2 MIXED HYPERLIPIDEMIA: ICD-10-CM

## 2024-06-07 DIAGNOSIS — R73.9 HYPERGLYCEMIA: ICD-10-CM

## 2024-06-07 DIAGNOSIS — R79.89 LOW TESTOSTERONE IN MALE: ICD-10-CM

## 2024-06-07 DIAGNOSIS — I10 PRIMARY HYPERTENSION: ICD-10-CM

## 2024-06-07 DIAGNOSIS — M10.072 ACUTE IDIOPATHIC GOUT INVOLVING TOE OF LEFT FOOT: Primary | ICD-10-CM

## 2024-06-07 PROCEDURE — 99214 OFFICE O/P EST MOD 30 MIN: CPT | Performed by: FAMILY MEDICINE

## 2024-06-07 RX ORDER — ATORVASTATIN CALCIUM 40 MG/1
40 TABLET, FILM COATED ORAL DAILY
Qty: 90 TABLET | Refills: 1 | Status: SHIPPED | OUTPATIENT
Start: 2024-06-07

## 2024-06-07 RX ORDER — COLCHICINE 0.6 MG/1
TABLET ORAL
Qty: 30 TABLET | Refills: 5 | Status: SHIPPED | OUTPATIENT
Start: 2024-06-07

## 2024-06-07 NOTE — PROGRESS NOTES
Chief Complaint   Patient presents with    Joint Pain     Follow up on labs/meds     Answers submitted by the patient for this visit:  Primary Reason for Visit (Submitted on 6/7/2024)  What is the primary reason for your visit?: Other  Other (Submitted on 6/7/2024)  Please describe your symptoms.: Reciew lab results. Discuss joint pain.  Have you had these symptoms before?: Yes  How long have you been having these symptoms?: 1-2 weeks    Subjective     Patient here for follow-up of elevated blood pressure.    He is exercising and is adherent to a low-salt diet.    Blood pressure is well controlled at home.   Cardiac symptoms: none.   Patient denies: chest pressure/discomfort.   Cardiovascular risk factors: hypertension and male gender.   Use of agents associated with hypertension: none.   History of target organ damage: none.  Patient is taking prescribed hypertension medications as prescribed without side effects.    The following portions of the patient's history were reviewed and updated as appropriate: allergies, current medications, past family history, past medical history, past social history, past surgical history, and problem list.    Review of Systems  Pertinent items are noted in HPI.    Results for orders placed or performed in visit on 05/01/24   PSA DIAGNOSTIC    Specimen: Blood   Result Value Ref Range    PSA 0.624 0.000 - 4.000 ng/mL   Testosterone, Free, Total    Specimen: Blood   Result Value Ref Range    Testosterone, Total 286 264 - 916 ng/dL    Testosterone, Free 12.2 6.8 - 21.5 pg/mL   Hepatic Function Panel    Specimen: Blood   Result Value Ref Range    Total Protein 7.0 6.0 - 8.5 g/dL    Albumin 4.7 3.5 - 5.2 g/dL    Total Bilirubin 0.5 0.0 - 1.2 mg/dL    Bilirubin, Direct <0.2 0.0 - 0.3 mg/dL    Alkaline Phosphatase 85 39 - 117 U/L    AST (SGOT) 52 (H) 1 - 40 U/L    ALT (SGPT) 77 (H) 1 - 41 U/L   Lipid Panel    Specimen: Blood   Result Value Ref Range    Total Cholesterol 304 (H) 0 - 200  "mg/dL    Triglycerides 412 (H) 0 - 150 mg/dL    HDL Cholesterol 69 (H) 40 - 60 mg/dL    VLDL Cholesterol Erick 78 (H) 5 - 40 mg/dL    LDL Chol Calc (NIH) 157 (H) 0 - 100 mg/dL        Vitals:    06/07/24 1459   BP: 118/88   BP Location: Left arm   Patient Position: Sitting   Cuff Size: Adult   Pulse: 102   Temp: 97.5 °F (36.4 °C)   SpO2: 97%   Weight: 89.4 kg (197 lb)   Height: 185.4 cm (72.99\")     BP Readings from Last 3 Encounters:   06/07/24 118/88   04/12/24 136/92   02/14/24 138/82     Objective            Gen: alert, pleasant.  Neck: no bruit, no enlarged thyroid  Lungs: CTA  Heart: RR, no murmur  Feet: no edema  Pulses: intact    Assessment & Plan   Hypertension, normal blood pressure Evidence of target organ damage: none.    Diagnoses and all orders for this visit:    1. Acute idiopathic gout involving toe of left foot (Primary)  -     colchicine 0.6 MG tablet; TAKE 2 PO WITH GOUT FLARE AND CAN REPEAT IN 6 HOURS IF NEEDED.  Dispense: 30 tablet; Refill: 5  -     Uric Acid; Future    2. Mixed hyperlipidemia  -     atorvastatin (Lipitor) 40 MG tablet; Take 1 tablet by mouth Daily. Indications: High Amount of Fats in the Blood  Dispense: 90 tablet; Refill: 1  -     Lipid Panel; Future    3. Low testosterone in male  -     Testosterone, Free, Total; Future    4. Primary hypertension  -     CBC (No Diff); Future  -     Comprehensive Metabolic Panel; Future    5. Hyperglycemia  -     Hemoglobin A1c; Future    Cholesterol still elevated.  He is not tolerant to the Crestor as it causes joint pain.  We will's about with Lipitor    He is starting to exercise more.  Watches diet.  Has cut back on the alcohol.    Medication: no change.  Follow up: 6 months and as needed.    There are no Patient Instructions on file for this visit.  Medications Discontinued During This Encounter   Medication Reason    rosuvastatin (Crestor) 10 MG tablet Side effects    colchicine 0.6 MG tablet Reorder        No follow-ups on file.    Limit " salt  Limit alcoholic drinks to 1 a day  Limit caffeine to 1-2 servings a day    Dr. Gaurav Cleaning MD  [place], [place]  [facility].

## 2024-06-16 DIAGNOSIS — F41.0 PANIC ATTACK AS REACTION TO STRESS: ICD-10-CM

## 2024-06-16 DIAGNOSIS — F43.0 PANIC ATTACK AS REACTION TO STRESS: ICD-10-CM

## 2024-06-17 RX ORDER — ALPRAZOLAM 0.5 MG/1
0.5 TABLET ORAL NIGHTLY PRN
Qty: 30 TABLET | Refills: 0 | Status: SHIPPED | OUTPATIENT
Start: 2024-06-17

## 2024-06-28 ENCOUNTER — PATIENT MESSAGE (OUTPATIENT)
Dept: FAMILY MEDICINE CLINIC | Facility: CLINIC | Age: 44
End: 2024-06-28
Payer: OTHER GOVERNMENT

## 2024-06-28 DIAGNOSIS — Z78.9 STATIN INTOLERANCE: ICD-10-CM

## 2024-06-28 DIAGNOSIS — E78.2 MIXED HYPERLIPIDEMIA: Primary | ICD-10-CM

## 2024-06-29 RX ORDER — EZETIMIBE 10 MG/1
10 TABLET ORAL DAILY
Qty: 90 TABLET | Refills: 1 | Status: SHIPPED | OUTPATIENT
Start: 2024-06-29

## 2024-06-29 NOTE — TELEPHONE ENCOUNTER
From: Dave Chavez  To: Gaurav Cleaning  Sent: 6/28/2024 8:58 PM EDT  Subject: Atorvastatin    Good evening Dr Cleaning. We switched my cholesterol medication on my last visit from crestor to atorvastatin. I am experiencing very similar side effects; muscle and joint pain, nausea lasting into the evening, and bouts of fatigue. I’m not sure if it’s related to the switch but it does feel worse than before. I’ve been very deliberate about taking my meds at bed time every day since seeing the results of my labs. I’m not really sure what to do at this point. Thanks!

## 2024-07-04 DIAGNOSIS — F98.8 ATTENTION DEFICIT DISORDER (ADD) WITHOUT HYPERACTIVITY: ICD-10-CM

## 2024-07-05 RX ORDER — DEXTROAMPHETAMINE SACCHARATE, AMPHETAMINE ASPARTATE MONOHYDRATE, DEXTROAMPHETAMINE SULFATE AND AMPHETAMINE SULFATE 2.5; 2.5; 2.5; 2.5 MG/1; MG/1; MG/1; MG/1
10 CAPSULE, EXTENDED RELEASE ORAL EVERY MORNING
Qty: 30 CAPSULE | Refills: 0 | Status: SHIPPED | OUTPATIENT
Start: 2024-07-05

## 2024-08-08 ENCOUNTER — OFFICE VISIT (OUTPATIENT)
Dept: FAMILY MEDICINE CLINIC | Facility: CLINIC | Age: 44
End: 2024-08-08
Payer: OTHER GOVERNMENT

## 2024-08-08 VITALS
OXYGEN SATURATION: 98 % | HEIGHT: 73 IN | DIASTOLIC BLOOD PRESSURE: 78 MMHG | TEMPERATURE: 98.1 F | HEART RATE: 100 BPM | WEIGHT: 194 LBS | SYSTOLIC BLOOD PRESSURE: 114 MMHG | BODY MASS INDEX: 25.71 KG/M2

## 2024-08-08 DIAGNOSIS — N45.1 RIGHT EPIDIDYMITIS: Primary | ICD-10-CM

## 2024-08-08 PROCEDURE — 99213 OFFICE O/P EST LOW 20 MIN: CPT | Performed by: FAMILY MEDICINE

## 2024-08-08 RX ORDER — LEVOFLOXACIN 500 MG/1
500 TABLET, FILM COATED ORAL DAILY
Qty: 10 TABLET | Refills: 0 | Status: SHIPPED | OUTPATIENT
Start: 2024-08-08

## 2024-08-08 NOTE — PROGRESS NOTES
"  Subjective   Dave Chavez is a 43 y.o. male who is here for   Chief Complaint   Patient presents with    Groin Pain     X 3 weeks   .     History of Present Illness   Louis reports that his right testicle area is tender and swollen.  He has had a before.  History of epididymitis.  No trauma.  No lumps or bumps.  No dysuria  No change in ejaculate.  No obvious visible rash.  No fever  Monogamous with wife    The following portions of the patient's history were reviewed and updated as appropriate: allergies, current medications, past family history, past medical history, past social history, past surgical history, and problem list.    Review of Systems    Objective   Vitals:    08/08/24 0855   BP: 114/78   BP Location: Left arm   Patient Position: Sitting   Cuff Size: Adult   Pulse: 100   Temp: 98.1 °F (36.7 °C)   SpO2: 98%   Weight: 88 kg (194 lb)   Height: 185.4 cm (72.99\")      Physical Exam  Vitals reviewed.   Neurological:      Mental Status: He is alert.     Patient felt uncomfortable with me examining him.    Assessment & Plan   Diagnoses and all orders for this visit:    1. Right epididymitis (Primary)  -     levoFLOXacin (Levaquin) 500 MG tablet; Take 1 tablet by mouth Daily.  Dispense: 10 tablet; Refill: 0    Will go ahead and treat empirically.  If symptoms or not improved after the 10 days of Levaquin he will come back.  There are no Patient Instructions on file for this visit.    Medications Discontinued During This Encounter   Medication Reason    predniSONE (DELTASONE) 10 MG tablet *Therapy completed        No follow-ups on file.    Dr. Gaurav Cleaning  [facility] [place], [place]    "

## 2024-08-21 DIAGNOSIS — R79.89 LOW TESTOSTERONE IN MALE: ICD-10-CM

## 2024-08-21 RX ORDER — TESTOSTERONE GEL, 1% 10 MG/G
GEL TRANSDERMAL
Qty: 90 EACH | Refills: 1 | Status: SHIPPED | OUTPATIENT
Start: 2024-08-21

## 2024-09-09 ENCOUNTER — OFFICE VISIT (OUTPATIENT)
Dept: FAMILY MEDICINE CLINIC | Facility: CLINIC | Age: 44
End: 2024-09-09
Payer: OTHER GOVERNMENT

## 2024-09-09 VITALS
SYSTOLIC BLOOD PRESSURE: 118 MMHG | WEIGHT: 197 LBS | TEMPERATURE: 98 F | OXYGEN SATURATION: 98 % | HEIGHT: 73 IN | DIASTOLIC BLOOD PRESSURE: 76 MMHG | HEART RATE: 90 BPM | BODY MASS INDEX: 26.11 KG/M2

## 2024-09-09 DIAGNOSIS — R12 HEARTBURN: ICD-10-CM

## 2024-09-09 DIAGNOSIS — M25.561 CHRONIC PAIN OF RIGHT KNEE: Primary | ICD-10-CM

## 2024-09-09 DIAGNOSIS — G89.29 CHRONIC PAIN OF RIGHT KNEE: Primary | ICD-10-CM

## 2024-09-09 PROBLEM — Z90.49 S/P LAPAROSCOPIC CHOLECYSTECTOMY: Status: RESOLVED | Noted: 2023-05-26 | Resolved: 2024-09-09

## 2024-09-09 PROCEDURE — 99214 OFFICE O/P EST MOD 30 MIN: CPT | Performed by: FAMILY MEDICINE

## 2024-09-09 RX ORDER — ATORVASTATIN CALCIUM 40 MG/1
40 TABLET, FILM COATED ORAL DAILY
COMMUNITY
Start: 2024-09-02 | End: 2024-09-09 | Stop reason: SINTOL

## 2024-09-09 NOTE — PROGRESS NOTES
"  Subjective   Dave Chavez is a 43 y.o. male who is here for   Chief Complaint   Patient presents with    Knee Pain     Right   .   Answers submitted by the patient for this visit:  Primary Reason for Visit (Submitted on 9/9/2024)  What is the primary reason for your visit?: Other  Other (Submitted on 9/9/2024)  Please describe your symptoms.: Right knee pain and gastroesophageal reflux  Have you had these symptoms before?: Yes  How long have you been having these symptoms?: Greater than 2 weeks  Please list any medications you are currently taking for this condition.: Prilosec and aleve    History of Present Illness     Chronic pain in both knees.  Worsening on the right.  Had seen Dr. Kleber Forman many years ago with the MRI  Limiting his ability to exercise    Also his heartburn is worse.  Would like a prescription for Prilosec        The following portions of the patient's history were reviewed and updated as appropriate: allergies, current medications, past family history, past medical history, past social history, past surgical history, and problem list.    Review of Systems    Objective   Vitals:    09/09/24 0923   BP: 118/76   BP Location: Left arm   Patient Position: Sitting   Cuff Size: Adult   Pulse: 90   Temp: 98 °F (36.7 °C)   SpO2: 98%   Weight: 89.4 kg (197 lb)   Height: 185.4 cm (72.99\")      Physical Exam  Vitals reviewed.   Musculoskeletal:      Right knee: No swelling.   Neurological:      Mental Status: He is alert.         Assessment & Plan   Diagnoses and all orders for this visit:    1. Chronic pain of right knee (Primary)  -     Ambulatory Referral to Orthopedic Surgery    2. Heartburn  -     omeprazole (priLOSEC) 20 MG capsule; Take 1 capsule by mouth Daily. Indications: Heartburn  Dispense: 90 capsule; Refill: 1      There are no Patient Instructions on file for this visit.    Medications Discontinued During This Encounter   Medication Reason    levoFLOXacin (Levaquin) 500 MG tablet " *Therapy completed    atorvastatin (LIPITOR) 40 MG tablet Side effects        Return in about 3 months (around 12/9/2024) for Annual physical.    Dr. Gaurav Cleaning  [place], [place]

## 2024-09-18 DIAGNOSIS — F43.0 PANIC ATTACK AS REACTION TO STRESS: ICD-10-CM

## 2024-09-18 DIAGNOSIS — F41.0 PANIC ATTACK AS REACTION TO STRESS: ICD-10-CM

## 2024-09-18 RX ORDER — ALPRAZOLAM 0.5 MG
0.5 TABLET ORAL NIGHTLY PRN
Qty: 30 TABLET | Refills: 0 | Status: SHIPPED | OUTPATIENT
Start: 2024-09-18

## 2024-09-20 ENCOUNTER — OFFICE VISIT (OUTPATIENT)
Dept: ORTHOPEDIC SURGERY | Facility: CLINIC | Age: 44
End: 2024-09-20
Payer: OTHER GOVERNMENT

## 2024-09-20 VITALS — HEIGHT: 71 IN | WEIGHT: 193.3 LBS | TEMPERATURE: 98.6 F | BODY MASS INDEX: 27.06 KG/M2

## 2024-09-20 DIAGNOSIS — M25.561 ACUTE PAIN OF RIGHT KNEE: Primary | ICD-10-CM

## 2024-09-20 DIAGNOSIS — R52 PAIN: ICD-10-CM

## 2024-09-20 RX ORDER — ROSUVASTATIN CALCIUM 10 MG/1
TABLET, COATED ORAL
COMMUNITY
Start: 2024-09-18

## 2024-09-23 ENCOUNTER — OFFICE VISIT (OUTPATIENT)
Dept: ORTHOPEDIC SURGERY | Facility: CLINIC | Age: 44
End: 2024-09-23
Payer: OTHER GOVERNMENT

## 2024-09-23 VITALS — TEMPERATURE: 97.3 F | HEIGHT: 71 IN | BODY MASS INDEX: 27.55 KG/M2 | WEIGHT: 196.8 LBS

## 2024-09-23 DIAGNOSIS — M25.561 ACUTE PAIN OF RIGHT KNEE: Primary | ICD-10-CM

## 2024-09-23 PROCEDURE — 20610 DRAIN/INJ JOINT/BURSA W/O US: CPT | Performed by: ORTHOPAEDIC SURGERY

## 2024-09-23 RX ORDER — METHYLPREDNISOLONE ACETATE 80 MG/ML
80 INJECTION, SUSPENSION INTRA-ARTICULAR; INTRALESIONAL; INTRAMUSCULAR; SOFT TISSUE
Status: COMPLETED | OUTPATIENT
Start: 2024-09-23 | End: 2024-09-23

## 2024-09-23 RX ORDER — LIDOCAINE HYDROCHLORIDE 10 MG/ML
2 INJECTION, SOLUTION EPIDURAL; INFILTRATION; INTRACAUDAL; PERINEURAL
Status: COMPLETED | OUTPATIENT
Start: 2024-09-23 | End: 2024-09-23

## 2024-09-23 RX ADMIN — LIDOCAINE HYDROCHLORIDE 2 ML: 10 INJECTION, SOLUTION EPIDURAL; INFILTRATION; INTRACAUDAL; PERINEURAL at 09:09

## 2024-09-23 RX ADMIN — METHYLPREDNISOLONE ACETATE 80 MG: 80 INJECTION, SUSPENSION INTRA-ARTICULAR; INTRALESIONAL; INTRAMUSCULAR; SOFT TISSUE at 09:09

## 2024-10-14 DIAGNOSIS — Z78.9 STATIN INTOLERANCE: ICD-10-CM

## 2024-10-14 DIAGNOSIS — E78.2 MIXED HYPERLIPIDEMIA: ICD-10-CM

## 2024-10-14 DIAGNOSIS — F98.8 ATTENTION DEFICIT DISORDER (ADD) WITHOUT HYPERACTIVITY: ICD-10-CM

## 2024-10-14 RX ORDER — EZETIMIBE 10 MG/1
10 TABLET ORAL DAILY
Qty: 90 TABLET | Refills: 1 | Status: SHIPPED | OUTPATIENT
Start: 2024-10-14

## 2024-10-14 RX ORDER — DEXTROAMPHETAMINE SACCHARATE, AMPHETAMINE ASPARTATE MONOHYDRATE, DEXTROAMPHETAMINE SULFATE AND AMPHETAMINE SULFATE 2.5; 2.5; 2.5; 2.5 MG/1; MG/1; MG/1; MG/1
10 CAPSULE, EXTENDED RELEASE ORAL EVERY MORNING
Qty: 30 CAPSULE | Refills: 0 | Status: SHIPPED | OUTPATIENT
Start: 2024-10-14

## 2024-10-17 ENCOUNTER — OFFICE VISIT (OUTPATIENT)
Dept: ORTHOPEDIC SURGERY | Facility: CLINIC | Age: 44
End: 2024-10-17
Payer: OTHER GOVERNMENT

## 2024-10-17 VITALS — BODY MASS INDEX: 27.65 KG/M2 | WEIGHT: 197.5 LBS | TEMPERATURE: 98.2 F | HEIGHT: 71 IN

## 2024-10-17 DIAGNOSIS — M25.561 RIGHT KNEE PAIN, UNSPECIFIED CHRONICITY: Primary | ICD-10-CM

## 2024-10-17 PROCEDURE — 99212 OFFICE O/P EST SF 10 MIN: CPT | Performed by: ORTHOPAEDIC SURGERY

## 2024-10-17 NOTE — PROGRESS NOTES
Patient returns today.  States his symptoms are much improved minimal pain.    Knee exam benign.    Right knee pain    Patient is near back to his baseline.  Told him to continue conservative treatment options to build back into activity as symptoms allow.  If the knee gets started back up he is instructed return for further evaluation all questions answered.

## 2024-11-17 DIAGNOSIS — F43.0 PANIC ATTACK AS REACTION TO STRESS: ICD-10-CM

## 2024-11-17 DIAGNOSIS — F41.0 PANIC ATTACK AS REACTION TO STRESS: ICD-10-CM

## 2024-11-18 RX ORDER — ALPRAZOLAM 0.5 MG
0.5 TABLET ORAL NIGHTLY PRN
Qty: 30 TABLET | Refills: 0 | Status: SHIPPED | OUTPATIENT
Start: 2024-11-18

## 2024-11-20 ENCOUNTER — HOSPITAL ENCOUNTER (OUTPATIENT)
Dept: CT IMAGING | Facility: HOSPITAL | Age: 44
Discharge: HOME | End: 2024-11-20
Admitting: FAMILY MEDICINE
Payer: OTHER GOVERNMENT

## 2024-11-20 ENCOUNTER — OFFICE VISIT (OUTPATIENT)
Dept: FAMILY MEDICINE CLINIC | Facility: CLINIC | Age: 44
End: 2024-11-20
Payer: OTHER GOVERNMENT

## 2024-11-20 VITALS
HEIGHT: 71 IN | TEMPERATURE: 97.8 F | OXYGEN SATURATION: 98 % | BODY MASS INDEX: 28.28 KG/M2 | DIASTOLIC BLOOD PRESSURE: 100 MMHG | HEART RATE: 90 BPM | SYSTOLIC BLOOD PRESSURE: 158 MMHG | WEIGHT: 202 LBS

## 2024-11-20 DIAGNOSIS — M54.2 NECK PAIN: ICD-10-CM

## 2024-11-20 DIAGNOSIS — G44.311 INTRACTABLE ACUTE POST-TRAUMATIC HEADACHE: ICD-10-CM

## 2024-11-20 DIAGNOSIS — R42 DIZZINESS: Primary | ICD-10-CM

## 2024-11-20 PROCEDURE — 70450 CT HEAD/BRAIN W/O DYE: CPT

## 2024-11-20 PROCEDURE — 99214 OFFICE O/P EST MOD 30 MIN: CPT | Performed by: FAMILY MEDICINE

## 2024-11-20 RX ORDER — CYCLOBENZAPRINE HCL 10 MG
10 TABLET ORAL 3 TIMES DAILY PRN
Qty: 30 TABLET | Refills: 0 | Status: SHIPPED | OUTPATIENT
Start: 2024-11-20 | End: 2024-11-20 | Stop reason: SDUPTHER

## 2024-11-20 RX ORDER — SUMATRIPTAN 100 MG/1
TABLET, FILM COATED ORAL
Qty: 9 TABLET | Refills: 0 | Status: SHIPPED | OUTPATIENT
Start: 2024-11-20

## 2024-11-20 RX ORDER — SUMATRIPTAN 100 MG/1
TABLET, FILM COATED ORAL
Qty: 9 TABLET | Refills: 0 | Status: SHIPPED | OUTPATIENT
Start: 2024-11-20 | End: 2024-11-20 | Stop reason: SDUPTHER

## 2024-11-20 RX ORDER — CYCLOBENZAPRINE HCL 10 MG
10 TABLET ORAL 3 TIMES DAILY PRN
Qty: 30 TABLET | Refills: 0 | Status: SHIPPED | OUTPATIENT
Start: 2024-11-20

## 2024-11-20 NOTE — PROGRESS NOTES
"  Subjective   Dave Chavez is a 44 y.o. male who is here for   Chief Complaint   Patient presents with    Neck Pain    dizziness with blurred vision    Headache   .   Dave Chavez presents to the office for evaluation of acute onset of headache, dizziness, unsteady gait, and neck pain. He states he had a fall while playing touch football and landed on his leftside. Since then, he has had persistent neck pain x 2 weeks. He had cute onset of severe headache, dizziness, and had difficulty with walking.     Neck Pain   This is a new problem. Episode onset: 2 weeks ago. The problem has been gradually worsening. The pain is present in the left side and midline. The quality of the pain is described as aching. The symptoms are aggravated by bending. Associated symptoms include headaches and a visual change. Pertinent negatives include no chest pain, fever, leg pain, numbness or weakness. He has tried NSAIDs for the symptoms. The treatment provided mild relief.   Headache  Changes in vision:  Blurred vision  Changes in sensation:  Neck pain, lightheadedness and balance problems     History of Present Illness      Review of Systems   Constitutional:  Negative for fever.   Cardiovascular:  Negative for chest pain.   Musculoskeletal:  Positive for neck pain.   Neurological:  Positive for headaches. Negative for weakness and numbness.       Objective   Vitals:    11/20/24 1002   BP: 158/100   BP Location: Left arm   Patient Position: Sitting   Cuff Size: Adult   Pulse: 90   Temp: 97.8 °F (36.6 °C)   SpO2: 98%   Weight: 91.6 kg (202 lb)   Height: 180.3 cm (70.98\")      Physical Exam  Vitals and nursing note reviewed.   Constitutional:       Appearance: Normal appearance. He is normal weight.   HENT:      Head: Normocephalic and atraumatic.   Cardiovascular:      Rate and Rhythm: Normal rate and regular rhythm.      Pulses: Normal pulses.      Heart sounds: No murmur heard.  Pulmonary:      Effort: Pulmonary effort is normal. No " respiratory distress.      Breath sounds: Normal breath sounds. No wheezing.   Musculoskeletal:      Cervical back: Spasms, tenderness and bony tenderness present. Normal range of motion.        Back:    Skin:     General: Skin is warm and dry.   Neurological:      General: No focal deficit present.      Mental Status: He is alert.      Cranial Nerves: Cranial nerves 2-12 are intact.      Sensory: Sensation is intact.      Motor: Motor function is intact.      Coordination: Romberg sign positive.   Psychiatric:         Mood and Affect: Mood normal.         Thought Content: Thought content normal.       Physical Exam        Assessment & Plan   Assessment & Plan    Diagnoses and all orders for this visit:    1. Dizziness (Primary)  Unclear etiology will obtain Head ct for evaluation given recent fall.  Patient is unsure if he may have hit his head.  -     CT Head Without Contrast    2. Neck pain  Likely related to muscle spasms from recent fall.   -     CT Head Without Contrast    3. Intractable acute post-traumatic headache  Unclear etiology. Will obtain CT of head for evaluation given his unsteadiness, severe headache, and dizziness.    -     CT Head Without Contrast      Results      There are no Patient Instructions on file for this visit.    There are no discontinued medications.     Return if symptoms worsen or fail to improve.             Sreedhar Mcdonald MD  [facility]  [place], [place]

## 2024-12-13 ENCOUNTER — OFFICE VISIT (OUTPATIENT)
Dept: FAMILY MEDICINE CLINIC | Facility: CLINIC | Age: 44
End: 2024-12-13
Payer: OTHER GOVERNMENT

## 2024-12-13 VITALS
TEMPERATURE: 98 F | HEIGHT: 71 IN | WEIGHT: 207 LBS | DIASTOLIC BLOOD PRESSURE: 90 MMHG | BODY MASS INDEX: 28.98 KG/M2 | SYSTOLIC BLOOD PRESSURE: 142 MMHG | OXYGEN SATURATION: 98 % | HEART RATE: 98 BPM

## 2024-12-13 DIAGNOSIS — M10.9 ACUTE GOUT INVOLVING TOE OF RIGHT FOOT, UNSPECIFIED CAUSE: ICD-10-CM

## 2024-12-13 DIAGNOSIS — I10 PRIMARY HYPERTENSION: ICD-10-CM

## 2024-12-13 DIAGNOSIS — R79.89 LOW TESTOSTERONE IN MALE: ICD-10-CM

## 2024-12-13 DIAGNOSIS — E78.2 MIXED HYPERLIPIDEMIA: Primary | ICD-10-CM

## 2024-12-13 PROCEDURE — 99214 OFFICE O/P EST MOD 30 MIN: CPT | Performed by: FAMILY MEDICINE

## 2024-12-13 RX ORDER — ALLOPURINOL 300 MG/1
300 TABLET ORAL DAILY
Qty: 90 TABLET | Refills: 3 | Status: SHIPPED | OUTPATIENT
Start: 2024-12-13

## 2024-12-13 RX ORDER — LOSARTAN POTASSIUM 100 MG/1
100 TABLET ORAL DAILY
Qty: 90 TABLET | Refills: 3 | Status: SHIPPED | OUTPATIENT
Start: 2024-12-13

## 2024-12-13 RX ORDER — ROSUVASTATIN CALCIUM 20 MG/1
20 TABLET, COATED ORAL DAILY
Qty: 90 TABLET | Refills: 3 | Status: SHIPPED | OUTPATIENT
Start: 2024-12-13

## 2024-12-13 NOTE — PROGRESS NOTES
Chief Complaint   Patient presents with    Hypertension    Hyperlipidemia       Subjective     Patient here for follow-up of elevated blood pressure.    He is exercising and is adherent to a low-salt diet.    Blood pressure is well controlled at home.   Cardiac symptoms: fatigue and weight gain.   Patient denies: chest pressure/discomfort.   Cardiovascular risk factors: hypertension, male gender, and sedentary lifestyle.   Use of agents associated with hypertension: amphetamines and steroids.   History of target organ damage: none.  Patient is taking prescribed hypertension medications as prescribed without side effects.    The following portions of the patient's history were reviewed and updated as appropriate: allergies, current medications, past family history, past medical history, past social history, past surgical history, and problem list.    Review of Systems  Pertinent items are noted in HPI.    Results for orders placed or performed in visit on 12/02/24   CBC (No Diff)    Collection Time: 12/06/24 10:16 AM    Specimen: Blood   Result Value Ref Range    WBC 5.13 3.40 - 10.80 10*3/mm3    RBC 4.80 4.14 - 5.80 10*6/mm3    Hemoglobin 14.9 13.0 - 17.7 g/dL    Hematocrit 43.6 37.5 - 51.0 %    MCV 90.8 79.0 - 97.0 fL    MCH 31.0 26.6 - 33.0 pg    MCHC 34.2 31.5 - 35.7 g/dL    RDW 12.6 12.3 - 15.4 %    Platelets 271 140 - 450 10*3/mm3   Comprehensive Metabolic Panel    Collection Time: 12/06/24 10:16 AM    Specimen: Blood   Result Value Ref Range    Glucose 107 (H) 65 - 99 mg/dL    BUN 11 6 - 20 mg/dL    Creatinine 1.08 0.76 - 1.27 mg/dL    EGFR Result 87.3 >60.0 mL/min/1.73    BUN/Creatinine Ratio 10.2 7.0 - 25.0    Sodium 142 136 - 145 mmol/L    Potassium 4.6 3.5 - 5.2 mmol/L    Chloride 102 98 - 107 mmol/L    Total CO2 20.8 (L) 22.0 - 29.0 mmol/L    Calcium 9.6 8.6 - 10.5 mg/dL    Total Protein 7.5 6.0 - 8.5 g/dL    Albumin 4.4 3.5 - 5.2 g/dL    Globulin 3.1 gm/dL    A/G Ratio 1.4 g/dL    Total Bilirubin 0.4  "0.0 - 1.2 mg/dL    Alkaline Phosphatase 102 39 - 117 U/L    AST (SGOT) 52 (H) 1 - 40 U/L    ALT (SGPT) 66 (H) 1 - 41 U/L   Lipid Panel    Collection Time: 12/06/24 10:16 AM    Specimen: Blood   Result Value Ref Range    Total Cholesterol 276 (H) 0 - 200 mg/dL    Triglycerides 336 (H) 0 - 150 mg/dL    HDL Cholesterol 79 (H) 40 - 60 mg/dL    VLDL Cholesterol Erick 60 (H) 5 - 40 mg/dL    LDL Chol Calc (NIH) 137 (H) 0 - 100 mg/dL   Hemoglobin A1c    Collection Time: 12/06/24 10:16 AM    Specimen: Blood   Result Value Ref Range    Hemoglobin A1C 5.50 4.80 - 5.60 %   Uric Acid    Collection Time: 12/06/24 10:16 AM    Specimen: Blood   Result Value Ref Range    Uric Acid 9.4 (H) 3.4 - 7.0 mg/dL   Testosterone, Free, Total    Collection Time: 12/06/24 10:16 AM    Specimen: Blood   Result Value Ref Range    Testosterone, Total 347 264 - 916 ng/dL    Testosterone, Free 15.2 6.8 - 21.5 pg/mL        Vitals:    12/13/24 1444   BP: 142/90   BP Location: Left arm   Patient Position: Sitting   Cuff Size: Adult   Pulse: 98   Temp: 98 °F (36.7 °C)   SpO2: 98%   Weight: 93.9 kg (207 lb)   Height: 180.3 cm (70.98\")     BP Readings from Last 3 Encounters:   12/13/24 142/90   11/20/24 158/100   09/09/24 118/76     Objective            Gen: alert, pleasant.  Neck: no bruit, no enlarged thyroid  Lungs: CTA  Heart: RR, no murmur  Feet: no edema  Pulses: intact    Assessment & Plan   Hypertension, normal blood pressure Evidence of target organ damage: none.    Diagnoses and all orders for this visit:    1. Mixed hyperlipidemia (Primary)  -     rosuvastatin (CRESTOR) 20 MG tablet; Take 1 tablet by mouth Daily. Indications: High Amount of Fats in the Blood  Dispense: 90 tablet; Refill: 3  -     Lipid Panel; Future    2. Primary hypertension  -     losartan (Cozaar) 100 MG tablet; Take 1 tablet by mouth Daily. Indications: High Blood Pressure  Dispense: 90 tablet; Refill: 3  -     Comprehensive Metabolic Panel; Future  -     Uric Acid; " Future    3. Acute gout involving toe of right foot, unspecified cause  -     allopurinol (Zyloprim) 300 MG tablet; Take 1 tablet by mouth Daily. Indications: Gout  Dispense: 90 tablet; Refill: 3    4. Low testosterone in male  -     Testosterone, Free, Total; Future    Labs reviewed  Liver function studies are still elevated.  Discussed fatty liver and alcohol consumption    Uric acid elevated at 9.4.  No recent gout attacks.  Discuss gout friendly diet.  Reducing alcohol.  Maintaining on allopurinol 300 daily    Cholesterol is still elevated.  Lets increase his statin  Medication: increase to 100 of losartan.  Follow up: 6 months and as needed.    There are no Patient Instructions on file for this visit.  Medications Discontinued During This Encounter   Medication Reason    losartan (Cozaar) 50 MG tablet Reorder    allopurinol (Zyloprim) 300 MG tablet Reorder    rosuvastatin (CRESTOR) 10 MG tablet Reorder        Return in about 6 months (around 6/13/2025) for blood pressure, new medication follow up, Annual physical.    Limit salt  Limit alcoholic drinks to 1 a day  Limit caffeine to 1-2 servings a day    Dr. Gaurav Cleaning MD  [place], [place]  [facility].

## 2024-12-21 ENCOUNTER — PATIENT MESSAGE (OUTPATIENT)
Dept: FAMILY MEDICINE CLINIC | Facility: CLINIC | Age: 44
End: 2024-12-21
Payer: OTHER GOVERNMENT

## 2025-01-06 DIAGNOSIS — F41.0 PANIC ATTACK AS REACTION TO STRESS: ICD-10-CM

## 2025-01-06 DIAGNOSIS — F43.0 PANIC ATTACK AS REACTION TO STRESS: ICD-10-CM

## 2025-01-07 RX ORDER — ALPRAZOLAM 0.5 MG
0.5 TABLET ORAL NIGHTLY PRN
Qty: 30 TABLET | Refills: 0 | Status: SHIPPED | OUTPATIENT
Start: 2025-01-07

## 2025-01-15 DIAGNOSIS — E78.2 MIXED HYPERLIPIDEMIA: ICD-10-CM

## 2025-01-15 DIAGNOSIS — Z78.9 STATIN INTOLERANCE: ICD-10-CM

## 2025-01-15 DIAGNOSIS — F98.8 ATTENTION DEFICIT DISORDER (ADD) WITHOUT HYPERACTIVITY: ICD-10-CM

## 2025-01-15 RX ORDER — DEXTROAMPHETAMINE SACCHARATE, AMPHETAMINE ASPARTATE MONOHYDRATE, DEXTROAMPHETAMINE SULFATE AND AMPHETAMINE SULFATE 2.5; 2.5; 2.5; 2.5 MG/1; MG/1; MG/1; MG/1
10 CAPSULE, EXTENDED RELEASE ORAL EVERY MORNING
Qty: 30 CAPSULE | Refills: 0 | Status: SHIPPED | OUTPATIENT
Start: 2025-01-15

## 2025-01-15 RX ORDER — EZETIMIBE 10 MG/1
10 TABLET ORAL DAILY
Qty: 90 TABLET | Refills: 4 | Status: SHIPPED | OUTPATIENT
Start: 2025-01-15

## 2025-01-16 DIAGNOSIS — R79.89 LOW TESTOSTERONE IN MALE: ICD-10-CM

## 2025-01-16 RX ORDER — TESTOSTERONE GEL, 1% 10 MG/G
GEL TRANSDERMAL
Qty: 90 EACH | Refills: 1 | Status: SHIPPED | OUTPATIENT
Start: 2025-01-16

## 2025-01-17 RX ORDER — SUMATRIPTAN SUCCINATE 100 MG/1
TABLET ORAL
Qty: 9 TABLET | Refills: 0 | Status: SHIPPED | OUTPATIENT
Start: 2025-01-17

## 2025-01-21 ENCOUNTER — OFFICE VISIT (OUTPATIENT)
Dept: FAMILY MEDICINE CLINIC | Facility: CLINIC | Age: 45
End: 2025-01-21
Payer: OTHER GOVERNMENT

## 2025-01-21 VITALS
HEART RATE: 84 BPM | TEMPERATURE: 97.6 F | DIASTOLIC BLOOD PRESSURE: 88 MMHG | WEIGHT: 203 LBS | BODY MASS INDEX: 28.42 KG/M2 | OXYGEN SATURATION: 98 % | HEIGHT: 71 IN | SYSTOLIC BLOOD PRESSURE: 132 MMHG

## 2025-01-21 DIAGNOSIS — I10 PRIMARY HYPERTENSION: ICD-10-CM

## 2025-01-21 DIAGNOSIS — E78.2 MIXED HYPERLIPIDEMIA: Primary | ICD-10-CM

## 2025-01-21 DIAGNOSIS — E66.811 CLASS 1 OBESITY DUE TO EXCESS CALORIES WITH SERIOUS COMORBIDITY AND BODY MASS INDEX (BMI) OF 30.0 TO 30.9 IN ADULT: ICD-10-CM

## 2025-01-21 DIAGNOSIS — E66.09 CLASS 1 OBESITY DUE TO EXCESS CALORIES WITH SERIOUS COMORBIDITY AND BODY MASS INDEX (BMI) OF 30.0 TO 30.9 IN ADULT: ICD-10-CM

## 2025-01-21 DIAGNOSIS — G47.30 MILD SLEEP APNEA: ICD-10-CM

## 2025-01-21 PROCEDURE — 99213 OFFICE O/P EST LOW 20 MIN: CPT | Performed by: FAMILY MEDICINE

## 2025-01-21 RX ORDER — ROSUVASTATIN CALCIUM 10 MG/1
10 TABLET, COATED ORAL DAILY
COMMUNITY
Start: 2024-12-18 | End: 2025-01-21

## 2025-01-21 RX ORDER — ROSUVASTATIN CALCIUM 20 MG/1
20 TABLET, COATED ORAL DAILY
Qty: 90 TABLET | Refills: 3 | Status: SHIPPED
Start: 2025-01-21

## 2025-01-21 NOTE — PROGRESS NOTES
Subjective   Dave Chavez is a 44 y.o. male who is here for   Chief Complaint   Patient presents with    Sleep Apnea    Obesity   .   Good afternoon. I am curious if I could get a prescription for Zepbound now that it has been approved for sleep apnea. With the combination of multiple attempts of weight loss, current BMI, and A1C level, I just figured I would ask. I’m not sure if [place] will approve it but I have a fellow [place] who recently received approval due to the aforementioned conditions. If I need to schedule an appointment to discuss, I am more than willing to do so. I really appreciate all your help and attention to my health.     Discuss prescription for sleep apnea  Symptoms are: chronic.   Onset was 1 to 6 months.   Symptoms occur: daily.  Symptoms include: joint pain, change in stool, fatigue, myalgias and nausea.   Pertinent negative symptoms include no abdominal pain, no anorexia, no chest pain, no chills, no congestion, no cough, no diaphoresis, no fever, no headaches, no joint swelling, no neck pain, no numbness, no rash, no sore throat, no swollen glands, no dysuria, no vertigo, no visual change, no vomiting and no weakness.   Treatment and/or Medications comments include: Current perscriptions and dieting attempts.        BJ would love to lose some weight.  A lot of recent health problems due to weight issues.  Including depression cholesterol high blood pressure sleep apnea.      The following portions of the patient's history were reviewed and updated as appropriate: allergies, current medications, past family history, past medical history, past social history, past surgical history, and problem list.    Review of Systems   Constitutional:  Positive for fatigue. Negative for chills, diaphoresis and fever.   HENT:  Negative for congestion and sore throat.    Respiratory:  Negative for cough.    Cardiovascular:  Negative for chest pain.   Gastrointestinal:  Positive for nausea. Negative for  "abdominal pain, anorexia and vomiting.   Genitourinary:  Negative for dysuria.   Musculoskeletal:  Positive for joint pain and myalgias. Negative for neck pain.   Skin:  Negative for rash.   Neurological:  Negative for vertigo, weakness, numbness and headaches.       Objective   Vitals:    01/21/25 1529   BP: 132/88   BP Location: Left arm   Patient Position: Sitting   Cuff Size: Adult   Pulse: 84   Temp: 97.6 °F (36.4 °C)   SpO2: 98%   Weight: 92.1 kg (203 lb)   Height: 180.3 cm (70.98\")      Physical Exam  Vitals reviewed.   Neurological:      Mental Status: He is alert.         Assessment & Plan   Diagnoses and all orders for this visit:    1. Mixed hyperlipidemia (Primary)  -     Tirzepatide-Weight Management (ZEPBOUND) 2.5 MG/0.5ML solution auto-injector; Inject 0.5 mL under the skin into the appropriate area as directed 1 (One) Time Per Week for 4 doses. Indications: OBESITY  Dispense: 2 mL; Refill: 0    2. Mild sleep apnea  -     Tirzepatide-Weight Management (ZEPBOUND) 2.5 MG/0.5ML solution auto-injector; Inject 0.5 mL under the skin into the appropriate area as directed 1 (One) Time Per Week for 4 doses. Indications: OBESITY  Dispense: 2 mL; Refill: 0    3. Primary hypertension  -     Tirzepatide-Weight Management (ZEPBOUND) 2.5 MG/0.5ML solution auto-injector; Inject 0.5 mL under the skin into the appropriate area as directed 1 (One) Time Per Week for 4 doses. Indications: OBESITY  Dispense: 2 mL; Refill: 0    4. Class 1 obesity due to excess calories with serious comorbidity and body mass index (BMI) of 30.0 to 30.9 in adult  -     Tirzepatide-Weight Management (ZEPBOUND) 2.5 MG/0.5ML solution auto-injector; Inject 0.5 mL under the skin into the appropriate area as directed 1 (One) Time Per Week for 4 doses. Indications: OBESITY  Dispense: 2 mL; Refill: 0    Other orders  -     rosuvastatin (CRESTOR) 20 MG tablet; Take 1 tablet by mouth Daily.  Dispense: 90 tablet; Refill: 3    It appears his insurance " does cover Zepbound.  Risk benefits, mechanism of action discussed  Come back in 1 month    Crestor correction, current dose is 20 mg once daily    There are no Patient Instructions on file for this visit.    Medications Discontinued During This Encounter   Medication Reason    rosuvastatin (CRESTOR) 10 MG tablet         Return in about 1 month (around 2/21/2025) for new medication follow up.    Dr. Gaurav Cleaning  [place], [place]

## 2025-01-31 DIAGNOSIS — I10 PRIMARY HYPERTENSION: ICD-10-CM

## 2025-01-31 DIAGNOSIS — F51.01 PRIMARY INSOMNIA: ICD-10-CM

## 2025-01-31 RX ORDER — HYDROXYZINE HYDROCHLORIDE 10 MG/1
10 TABLET, FILM COATED ORAL NIGHTLY
Qty: 90 TABLET | Refills: 3 | Status: SHIPPED | OUTPATIENT
Start: 2025-01-31

## 2025-01-31 RX ORDER — LOSARTAN POTASSIUM 50 MG/1
50 TABLET ORAL DAILY
Qty: 90 TABLET | Refills: 3 | Status: SHIPPED | OUTPATIENT
Start: 2025-01-31

## 2025-02-04 ENCOUNTER — OFFICE VISIT (OUTPATIENT)
Dept: FAMILY MEDICINE CLINIC | Facility: CLINIC | Age: 45
End: 2025-02-04
Payer: OTHER GOVERNMENT

## 2025-02-04 VITALS
WEIGHT: 198 LBS | OXYGEN SATURATION: 98 % | HEIGHT: 71 IN | DIASTOLIC BLOOD PRESSURE: 88 MMHG | HEART RATE: 108 BPM | BODY MASS INDEX: 27.72 KG/M2 | TEMPERATURE: 97.8 F | SYSTOLIC BLOOD PRESSURE: 126 MMHG

## 2025-02-04 DIAGNOSIS — R05.1 ACUTE COUGH: Primary | ICD-10-CM

## 2025-02-04 DIAGNOSIS — J10.1 INFLUENZA A: ICD-10-CM

## 2025-02-04 LAB
EXPIRATION DATE: ABNORMAL
FLUAV AG UPPER RESP QL IA.RAPID: DETECTED
FLUBV AG UPPER RESP QL IA.RAPID: NOT DETECTED
INTERNAL CONTROL: ABNORMAL
Lab: ABNORMAL
SARS-COV-2 AG UPPER RESP QL IA.RAPID: NOT DETECTED

## 2025-02-04 PROCEDURE — 99213 OFFICE O/P EST LOW 20 MIN: CPT | Performed by: FAMILY MEDICINE

## 2025-02-04 PROCEDURE — 87428 SARSCOV & INF VIR A&B AG IA: CPT | Performed by: FAMILY MEDICINE

## 2025-02-04 NOTE — PROGRESS NOTES
"  Chief Complaint   Patient presents with    Fever    Chills    Dizziness    Cough    Sore Throat     Upper Respiratory Infection: Patient complains of symptoms of a URI. Symptoms include congestion, cough, and fever. Onset of symptoms was 1 day ago, gradually worsening since that time. .  He is drinking plenty of fluids. Evaluation to date: none. Treatment to date: none.  Ill contacts at home or school or work discussed.  Kids are sick at home, his wife as well.  Vitals:    02/04/25 1034   BP: 126/88   BP Location: Left arm   Patient Position: Sitting   Cuff Size: Adult   Pulse: 108   Temp: 97.8 °F (36.6 °C)   SpO2: 98%   Weight: 89.8 kg (198 lb)   Height: 180.3 cm (70.98\")     Gen: mildly ill appearing, alert  Ears:  Nose:  Congestion  Throat:    Neck: no LAD  Lung: mild rales, good air movement, regular RR  Heart: RR without murmur  Skin: no rash.    Results for orders placed or performed in visit on 02/04/25   POCT SARS-CoV-2 + Flu Antigen MI    Collection Time: 02/04/25 10:46 AM    Specimen: Swab   Result Value Ref Range    SARS Antigen Not Detected Not Detected, Presumptive Negative    Influenza A Antigen MI Detected (A) Not Detected    Influenza B Antigen MI Not Detected Not Detected    Internal Control Passed Passed    Lot Number [id number]     Expiration Date 11,142,025        Assessment & Plan   Diagnoses and all orders for this visit:    1. Acute cough (Primary)  -     POCT SARS-CoV-2 + Flu Antigen MI    2. Influenza A    Stay home for the rest of the week.       There are no Patient Instructions on file for this visit.  Tylenol or Advil as needed for pain, fever, muscle aches  Plenty of fluids  Hand washing discussed  Off work or school note given if needed.  Warm tea for throat.  Pros and cons of antibiotic use discussed    Dr. Gaurav Cleaning MD  [place], [place]  [facility]  "

## 2025-02-17 ENCOUNTER — OFFICE VISIT (OUTPATIENT)
Dept: FAMILY MEDICINE CLINIC | Facility: CLINIC | Age: 45
End: 2025-02-17
Payer: OTHER GOVERNMENT

## 2025-02-17 VITALS
SYSTOLIC BLOOD PRESSURE: 108 MMHG | DIASTOLIC BLOOD PRESSURE: 80 MMHG | TEMPERATURE: 97.1 F | WEIGHT: 198.3 LBS | HEIGHT: 71 IN | HEART RATE: 79 BPM | OXYGEN SATURATION: 98 % | BODY MASS INDEX: 27.76 KG/M2

## 2025-02-17 DIAGNOSIS — E66.09 CLASS 1 OBESITY DUE TO EXCESS CALORIES WITH SERIOUS COMORBIDITY AND BODY MASS INDEX (BMI) OF 30.0 TO 30.9 IN ADULT: ICD-10-CM

## 2025-02-17 DIAGNOSIS — E66.811 CLASS 1 OBESITY DUE TO EXCESS CALORIES WITH SERIOUS COMORBIDITY AND BODY MASS INDEX (BMI) OF 30.0 TO 30.9 IN ADULT: Primary | ICD-10-CM

## 2025-02-17 DIAGNOSIS — E66.09 CLASS 1 OBESITY DUE TO EXCESS CALORIES WITH SERIOUS COMORBIDITY AND BODY MASS INDEX (BMI) OF 30.0 TO 30.9 IN ADULT: Primary | ICD-10-CM

## 2025-02-17 DIAGNOSIS — E66.811 CLASS 1 OBESITY DUE TO EXCESS CALORIES WITH SERIOUS COMORBIDITY AND BODY MASS INDEX (BMI) OF 30.0 TO 30.9 IN ADULT: ICD-10-CM

## 2025-02-17 PROCEDURE — 99213 OFFICE O/P EST LOW 20 MIN: CPT | Performed by: FAMILY MEDICINE

## 2025-02-17 RX ORDER — CLONIDINE HYDROCHLORIDE 0.1 MG/1
TABLET ORAL
COMMUNITY
Start: 2025-02-11

## 2025-02-17 RX ORDER — TIRZEPATIDE 2.5 MG/.5ML
INJECTION, SOLUTION SUBCUTANEOUS
COMMUNITY
Start: 2025-01-24 | End: 2025-02-17 | Stop reason: DRUGHIGH

## 2025-02-17 RX ORDER — NALTREXONE HYDROCHLORIDE 50 MG/1
TABLET, FILM COATED ORAL
COMMUNITY
Start: 2025-02-11

## 2025-02-17 NOTE — PROGRESS NOTES
"  Subjective   Dave Chavez is a 44 y.o. male who is here for   Chief Complaint   Patient presents with    Follow-up   .     Weight Management  Weight:  Unchanged  Weight loss treatment:  Low calorie, low carb diet, portion control, increasing exercise, starting exercise program, decreasing alcohol consumption and prescription medications  Medication side effects:  Nausea  Treatment barriers:  Adherence to exercise  Physical activity tolerance:  Stable  Energy level:  Unchanged     BJ is here in follow-up with Zepbound weight loss medication  Doing much better on the medication  Less eating stays full longer.  Some constipation, using MiraLAX  Has cut way back on his alcohol consumption  No longer eating at night  Blood pressure has improved        The following portions of the patient's history were reviewed and updated as appropriate: allergies, current medications, past family history, past medical history, past social history, past surgical history, and problem list.    Review of Systems    Objective   Vitals:    02/17/25 1542   BP: 108/80   Pulse: 79   Temp: 97.1 °F (36.2 °C)   TempSrc: Infrared   SpO2: 98%   Weight: 89.9 kg (198 lb 4.8 oz)   Height: 180.3 cm (70.98\")      Physical Exam  Vitals reviewed.   Neurological:      Mental Status: He is alert.         Assessment & Plan   Diagnoses and all orders for this visit:    1. Class 1 obesity due to excess calories with serious comorbidity and body mass index (BMI) of 30.0 to 30.9 in adult (Primary)  -     Tirzepatide-Weight Management (ZEPBOUND) 5 MG/0.5ML solution auto-injector; Inject 0.5 mL under the skin into the appropriate area as directed 1 (One) Time Per Week for 4 doses.  Dispense: 2 mL; Refill: 0    Increase Zepbound up to 5 mg  Call in 1 month, to increase to 7.5 and so forth  There are no Patient Instructions on file for this visit.    Medications Discontinued During This Encounter   Medication Reason    losartan (COZAAR) 50 MG tablet Dose " adjustment    Zepbound 2.5 MG/0.5ML solution auto-injector Dose adjustment        No follow-ups on file.    Dr. Gaurav Cleaning  [place], [place]

## 2025-03-10 DIAGNOSIS — F41.0 PANIC ATTACK AS REACTION TO STRESS: ICD-10-CM

## 2025-03-10 DIAGNOSIS — F43.0 PANIC ATTACK AS REACTION TO STRESS: ICD-10-CM

## 2025-03-10 DIAGNOSIS — F98.8 ATTENTION DEFICIT DISORDER (ADD) WITHOUT HYPERACTIVITY: ICD-10-CM

## 2025-03-10 RX ORDER — ALPRAZOLAM 0.5 MG
0.5 TABLET ORAL NIGHTLY PRN
Qty: 30 TABLET | Refills: 0 | Status: SHIPPED | OUTPATIENT
Start: 2025-03-10

## 2025-03-10 RX ORDER — DEXTROAMPHETAMINE SACCHARATE, AMPHETAMINE ASPARTATE MONOHYDRATE, DEXTROAMPHETAMINE SULFATE AND AMPHETAMINE SULFATE 2.5; 2.5; 2.5; 2.5 MG/1; MG/1; MG/1; MG/1
10 CAPSULE, EXTENDED RELEASE ORAL EVERY MORNING
Qty: 30 CAPSULE | Refills: 0 | Status: SHIPPED | OUTPATIENT
Start: 2025-03-10

## 2025-03-17 ENCOUNTER — OFFICE VISIT (OUTPATIENT)
Dept: FAMILY MEDICINE CLINIC | Facility: CLINIC | Age: 45
End: 2025-03-17
Payer: OTHER GOVERNMENT

## 2025-03-17 VITALS
HEART RATE: 75 BPM | BODY MASS INDEX: 27.16 KG/M2 | DIASTOLIC BLOOD PRESSURE: 76 MMHG | WEIGHT: 194 LBS | HEIGHT: 71 IN | SYSTOLIC BLOOD PRESSURE: 110 MMHG | TEMPERATURE: 97.1 F | OXYGEN SATURATION: 98 %

## 2025-03-17 DIAGNOSIS — M54.50 ACUTE BILATERAL LOW BACK PAIN WITHOUT SCIATICA: Primary | ICD-10-CM

## 2025-03-17 PROCEDURE — 99213 OFFICE O/P EST LOW 20 MIN: CPT | Performed by: FAMILY MEDICINE

## 2025-03-17 RX ORDER — CELECOXIB 200 MG/1
200 CAPSULE ORAL 2 TIMES DAILY
Qty: 60 CAPSULE | Refills: 0 | Status: SHIPPED | OUTPATIENT
Start: 2025-03-17

## 2025-03-17 NOTE — PROGRESS NOTES
"  Subjective   Dave Chavez is a 44 y.o. male who is here for   Chief Complaint   Patient presents with    Back Pain   .     History of Present Illness   Acute lumbar pain.  Midline.  Had the problem before.  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  He put down all the hardwood denise himself  Also did lots of extra exercise over the weekend kettle bells excetra.      The following portions of the patient's history were reviewed and updated as appropriate: allergies, current medications, past medical history, past social history, past surgical history, and problem list.    Review of Systems    Objective   Vitals:    03/17/25 0815   BP: 110/76   BP Location: Left arm   Patient Position: Sitting   Cuff Size: Large Adult   Pulse: 75   Temp: 97.1 °F (36.2 °C)   SpO2: 98%   Weight: 88 kg (194 lb)   Height: 180.3 cm (70.98\")      Physical Exam  Vitals reviewed.   Musculoskeletal:      Lumbar back: Decreased range of motion.        Back:    Neurological:      Mental Status: He is alert.         Assessment & Plan   Diagnoses and all orders for this visit:    1. Acute bilateral low back pain without sciatica (Primary)  -     celecoxib (CeleBREX) 200 MG capsule; Take 1 capsule by mouth 2 (Two) Times a Day.  Dispense: 60 capsule; Refill: 0  -     tiZANidine (Zanaflex) 4 MG tablet; Take 1 tablet by mouth Every 8 (Eight) Hours As Needed for Muscle Spasms.  Dispense: 30 tablet; Refill: 0      There are no Patient Instructions on file for this visit.    There are no discontinued medications.     No follow-ups on file.    Dr. Gaurav Cleaning  [place], [place]    "

## 2025-04-03 DIAGNOSIS — R11.0 NAUSEA: Primary | ICD-10-CM

## 2025-04-03 RX ORDER — ONDANSETRON 4 MG/1
4 TABLET, ORALLY DISINTEGRATING ORAL EVERY 8 HOURS PRN
Qty: 30 TABLET | Refills: 2 | Status: SHIPPED | OUTPATIENT
Start: 2025-04-03

## 2025-04-08 ENCOUNTER — OFFICE VISIT (OUTPATIENT)
Dept: ORTHOPEDIC SURGERY | Facility: CLINIC | Age: 45
End: 2025-04-08
Payer: OTHER GOVERNMENT

## 2025-04-08 VITALS — TEMPERATURE: 98.4 F | WEIGHT: 186.2 LBS | BODY MASS INDEX: 26.07 KG/M2 | HEIGHT: 71 IN

## 2025-04-08 DIAGNOSIS — M25.561 RIGHT KNEE PAIN, UNSPECIFIED CHRONICITY: Primary | ICD-10-CM

## 2025-04-08 PROCEDURE — 99214 OFFICE O/P EST MOD 30 MIN: CPT | Performed by: ORTHOPAEDIC SURGERY

## 2025-04-08 RX ORDER — TIRZEPATIDE 7.5 MG/.5ML
INJECTION, SOLUTION SUBCUTANEOUS
COMMUNITY
Start: 2025-03-27

## 2025-04-08 NOTE — PROGRESS NOTES
Patient: Dave Chavez  YOB: 1980 44 y.o. male  Medical Record Number: [id number]    Chief Complaints:   Acute on chronic right knee pain    History of Present Illness:Dave Chavez is a 44 y.o. male who presents for follow-up of acute on chronic right knee pain.  Patient was seen previously and on his last visit he was doing well we had tried some conservative treatment glute injection however his symptoms have recurred and progressed they are now limiting he states he has generalized knee pain and he gives out gets weak causes his knee to give.    Allergies:   Allergies   Allergen Reactions    Doxepin Mental Status Change     Groggy in morning    Penicillins Unknown - Low Severity     childhood       Medications:   Current Outpatient Medications   Medication Sig Dispense Refill    allopurinol (Zyloprim) 300 MG tablet Take 1 tablet by mouth Daily. Indications: Gout 90 tablet 3    ALPRAZolam (XANAX) 0.5 MG tablet Take 1 tablet by mouth At Night As Needed for Anxiety. for anxiety 30 tablet 0    amphetamine-dextroamphetamine XR (Adderall XR) 10 MG 24 hr capsule Take 1 capsule by mouth Every Morning Indications: Attention Deficit Hyperactivity Disorder 30 capsule 0    celecoxib (CeleBREX) 200 MG capsule Take 1 capsule by mouth 2 (Two) Times a Day. 60 capsule 0    cholestyramine (QUESTRAN) 4 g packet TAKE 1 PACKET BY MOUTH DAILY WITH LUNCH. INDICATIONS: DIARRHEA DUE TO BILE ACIDS 90 packet 3    cloNIDine (CATAPRES) 0.1 MG tablet TAKE 1 TO 2 TABLETS BY MOUTH EVERY DAY AS NEEDED FOR PANIC OR SLEEP      colchicine 0.6 MG tablet TAKE 2 PO WITH GOUT FLARE AND CAN REPEAT IN 6 HOURS IF NEEDED. 30 tablet 5    cyclobenzaprine (FLEXERIL) 10 MG tablet Take 1 tablet by mouth 3 (Three) Times a Day As Needed for Muscle Spasms. 30 tablet 0    EPINEPHrine (EPIPEN) 0.3 MG/0.3ML solution auto-injector injection Inject 0.3 mL into the appropriate muscle as directed by prescriber 1 (One) Time.      ezetimibe (ZETIA) 10 MG  "tablet Take 1 tablet by mouth Daily. 90 tablet 4    hydrOXYzine (ATARAX) 10 MG tablet TAKE 1 TABLET BY MOUTH EVERY NIGHT. INDICATIONS: SLEEP 90 tablet 3    losartan (Cozaar) 100 MG tablet Take 1 tablet by mouth Daily. Indications: High Blood Pressure 90 tablet 3    naltrexone (DEPADE) 50 MG tablet TAKE ONE-HALF TABLETS BY MOUTH ONCE A DAY FOR 7 DAYS, THEN TAKE 1 TABLET ONCE A DAY      omeprazole (priLOSEC) 20 MG capsule Take 1 capsule by mouth Daily. Indications: Heartburn 90 capsule 1    ondansetron ODT (ZOFRAN-ODT) 4 MG disintegrating tablet Place 1 tablet on the tongue Every 8 (Eight) Hours As Needed for Nausea or Vomiting. Indications: Nausea and Vomiting 30 tablet 2    rosuvastatin (CRESTOR) 20 MG tablet Take 1 tablet by mouth Daily. 90 tablet 3    SUMAtriptan (IMITREX) 100 MG tablet TAKE ONE TABLET AT ONSET OF HEADACHE. MAY REPEAT DOSE ONE TIME IN 2 HOURS IF HEADACHE NOT RELIEVED. 9 tablet 0    testosterone (ANDROGEL) 50 MG/5GM (1%) gel gel Place 50 g on skin daily as directed. Dx  Indications: Deficient Activity of the Testis 90 each 1    tiZANidine (Zanaflex) 4 MG tablet Take 1 tablet by mouth Every 8 (Eight) Hours As Needed for Muscle Spasms. 30 tablet 0    Zepbound 7.5 MG/0.5ML solution auto-injector        No current facility-administered medications for this visit.         The following portions of the patient's history were reviewed and updated as appropriate: allergies, current medications, past family history, past medical history, past social history, past surgical history and problem list.    Review of Systems:   A 14 point review of systems was performed. All systems negative except pertinent positives/negative listed in HPI above    Physical Exam:   Vitals:    04/08/25 1547   Temp: 98.4 °F (36.9 °C)   TempSrc: Temporal   Weight: 84.5 kg (186 lb 3.2 oz)   Height: 180.3 cm (71\")   PainSc: 2    PainLoc: Knee       General: A and O x 3, ASA, NAD    SCLERA:    Normal    DENTITION:   Normal    Right " knee examined gentle range of motion overall tolerated minimal discomfort throughout exam indeterminate Kusum's.        Assessment/Plan:  Right knee pain, possible meniscal/internal involvement    I discussed the findings with the patient given this new/increased onset of symptoms and chronic history  feel advanced imaging specifically MRI is warranted for further evaluation and treatment management.  Patient will follow-up in 3 weeks.  Continue Cerva treatment during the interim.  All questions answered patient understands and agrees.

## 2025-04-09 DIAGNOSIS — F41.0 PANIC ATTACK AS REACTION TO STRESS: ICD-10-CM

## 2025-04-09 DIAGNOSIS — R12 HEARTBURN: ICD-10-CM

## 2025-04-09 DIAGNOSIS — M10.072 ACUTE IDIOPATHIC GOUT INVOLVING TOE OF LEFT FOOT: ICD-10-CM

## 2025-04-09 DIAGNOSIS — F43.0 PANIC ATTACK AS REACTION TO STRESS: ICD-10-CM

## 2025-04-10 RX ORDER — OMEPRAZOLE 20 MG/1
20 CAPSULE, DELAYED RELEASE ORAL DAILY
Qty: 90 CAPSULE | Refills: 1 | Status: SHIPPED | OUTPATIENT
Start: 2025-04-10

## 2025-04-10 RX ORDER — COLCHICINE 0.6 MG/1
TABLET ORAL
Qty: 30 TABLET | Refills: 5 | Status: SHIPPED | OUTPATIENT
Start: 2025-04-10

## 2025-04-10 RX ORDER — ALPRAZOLAM 0.5 MG
0.5 TABLET ORAL NIGHTLY PRN
Qty: 30 TABLET | Refills: 0 | Status: SHIPPED | OUTPATIENT
Start: 2025-04-10

## 2025-04-13 DIAGNOSIS — M54.50 ACUTE BILATERAL LOW BACK PAIN WITHOUT SCIATICA: ICD-10-CM

## 2025-04-13 RX ORDER — CELECOXIB 200 MG/1
200 CAPSULE ORAL 2 TIMES DAILY
Qty: 60 CAPSULE | Refills: 0 | Status: SHIPPED | OUTPATIENT
Start: 2025-04-13

## 2025-04-20 RX ORDER — TIRZEPATIDE 7.5 MG/.5ML
7.5 INJECTION, SOLUTION SUBCUTANEOUS WEEKLY
Qty: 2 ML | Refills: 2 | Status: SHIPPED | OUTPATIENT
Start: 2025-04-20 | End: 2025-04-21 | Stop reason: DRUGHIGH

## 2025-04-20 RX ORDER — TIRZEPATIDE 7.5 MG/.5ML
7.5 INJECTION, SOLUTION SUBCUTANEOUS WEEKLY
Qty: 2 ML | Refills: 2 | Status: CANCELLED | OUTPATIENT
Start: 2025-04-20 | End: 2025-07-07

## 2025-04-28 DIAGNOSIS — Z90.49 S/P LAPAROSCOPIC CHOLECYSTECTOMY: ICD-10-CM

## 2025-04-28 RX ORDER — CHOLESTYRAMINE 4 G/9G
1 POWDER, FOR SUSPENSION ORAL
Qty: 90 PACKET | Refills: 3 | Status: SHIPPED | OUTPATIENT
Start: 2025-04-28

## 2025-04-29 ENCOUNTER — HOSPITAL ENCOUNTER (OUTPATIENT)
Dept: MRI IMAGING | Facility: HOSPITAL | Age: 45
Discharge: HOME | End: 2025-04-29
Admitting: ORTHOPAEDIC SURGERY
Payer: OTHER GOVERNMENT

## 2025-04-29 DIAGNOSIS — M25.561 RIGHT KNEE PAIN, UNSPECIFIED CHRONICITY: ICD-10-CM

## 2025-04-29 PROCEDURE — 73721 MRI JNT OF LWR EXTRE W/O DYE: CPT

## 2025-05-06 ENCOUNTER — OFFICE VISIT (OUTPATIENT)
Dept: ORTHOPEDIC SURGERY | Facility: CLINIC | Age: 45
End: 2025-05-06
Payer: OTHER GOVERNMENT

## 2025-05-06 VITALS — WEIGHT: 182.2 LBS | BODY MASS INDEX: 25.51 KG/M2 | TEMPERATURE: 98.4 F | HEIGHT: 71 IN

## 2025-05-06 DIAGNOSIS — M25.561 RIGHT KNEE PAIN, UNSPECIFIED CHRONICITY: ICD-10-CM

## 2025-05-06 DIAGNOSIS — M17.11 PRIMARY OSTEOARTHRITIS OF RIGHT KNEE: Primary | ICD-10-CM

## 2025-05-06 DIAGNOSIS — M25.561 ACUTE PAIN OF RIGHT KNEE: ICD-10-CM

## 2025-05-06 DIAGNOSIS — R52 PAIN: ICD-10-CM

## 2025-05-06 PROCEDURE — 99214 OFFICE O/P EST MOD 30 MIN: CPT | Performed by: ORTHOPAEDIC SURGERY

## 2025-05-06 NOTE — PROGRESS NOTES
Patient: Daev Chavez  YOB: 1980 44 y.o. male  Medical Record Number: [id number]    Chief Complaints:   Chief Complaint   Patient presents with    Right Knee - Follow-up, Pain       History of Present Illness:Dave Chavez is a 44 y.o. male who presents for follow-up of right knee pain.  Patient has been having knee pain and treatment and workup have been done.  He has failed some conservative treatments and MRI was performed we will discuss further results and options today.  He states the knee continues to be bothersome at times with increased activity.  He really would like to back to running at some point but has refrained due to some of the symptoms.    Allergies:   Allergies   Allergen Reactions    Doxepin Mental Status Change     Groggy in morning    Penicillins Unknown - Low Severity     childhood       Medications:   Current Outpatient Medications   Medication Sig Dispense Refill    allopurinol (Zyloprim) 300 MG tablet Take 1 tablet by mouth Daily. Indications: Gout 90 tablet 3    ALPRAZolam (XANAX) 0.5 MG tablet Take 1 tablet by mouth At Night As Needed for Anxiety. for anxiety 30 tablet 0    amphetamine-dextroamphetamine XR (Adderall XR) 10 MG 24 hr capsule Take 1 capsule by mouth Every Morning Indications: Attention Deficit Hyperactivity Disorder 30 capsule 0    celecoxib (CeleBREX) 200 MG capsule TAKE 1 CAPSULE BY MOUTH TWICE A DAY 60 capsule 0    cholestyramine (QUESTRAN) 4 g packet TAKE 1 PACKET BY MOUTH DAILY WITH LUNCH. INDICATIONS: DIARRHEA DUE TO BILE ACIDS 90 packet 3    cloNIDine (CATAPRES) 0.1 MG tablet TAKE 1 TO 2 TABLETS BY MOUTH EVERY DAY AS NEEDED FOR PANIC OR SLEEP      colchicine 0.6 MG tablet TAKE 2 PO WITH GOUT FLARE AND CAN REPEAT IN 6 HOURS IF NEEDED. 30 tablet 5    EPINEPHrine (EPIPEN) 0.3 MG/0.3ML solution auto-injector injection Inject 0.3 mL into the appropriate muscle as directed by prescriber 1 (One) Time.      ezetimibe (ZETIA) 10 MG tablet Take 1 tablet by mouth  Daily. 90 tablet 4    hydrOXYzine (ATARAX) 10 MG tablet TAKE 1 TABLET BY MOUTH EVERY NIGHT. INDICATIONS: SLEEP 90 tablet 3    losartan (Cozaar) 100 MG tablet Take 1 tablet by mouth Daily. Indications: High Blood Pressure 90 tablet 3    omeprazole (priLOSEC) 20 MG capsule Take 1 capsule by mouth Daily. Indications: Heartburn 90 capsule 1    ondansetron ODT (ZOFRAN-ODT) 4 MG disintegrating tablet Place 1 tablet on the tongue Every 8 (Eight) Hours As Needed for Nausea or Vomiting. Indications: Nausea and Vomiting 30 tablet 2    rosuvastatin (CRESTOR) 20 MG tablet Take 1 tablet by mouth Daily. 90 tablet 3    SUMAtriptan (IMITREX) 100 MG tablet TAKE ONE TABLET AT ONSET OF HEADACHE. MAY REPEAT DOSE ONE TIME IN 2 HOURS IF HEADACHE NOT RELIEVED. 9 tablet 0    testosterone (ANDROGEL) 50 MG/5GM (1%) gel gel Place 50 g on skin daily as directed. Dx  Indications: Deficient Activity of the Testis 90 each 1    Tirzepatide-Weight Management (ZEPBOUND) 5 MG/0.5ML solution auto-injector Inject 0.5 mL under the skin into the appropriate area as directed 1 (One) Time Per Week for 24 doses. 2 mL 5    cyclobenzaprine (FLEXERIL) 10 MG tablet Take 1 tablet by mouth 3 (Three) Times a Day As Needed for Muscle Spasms. (Patient not taking: Reported on 5/6/2025) 30 tablet 0    naltrexone (DEPADE) 50 MG tablet TAKE ONE-HALF TABLETS BY MOUTH ONCE A DAY FOR 7 DAYS, THEN TAKE 1 TABLET ONCE A DAY (Patient not taking: Reported on 5/6/2025)      tiZANidine (Zanaflex) 4 MG tablet Take 1 tablet by mouth Every 8 (Eight) Hours As Needed for Muscle Spasms. (Patient not taking: Reported on 5/6/2025) 30 tablet 0     No current facility-administered medications for this visit.         The following portions of the patient's history were reviewed and updated as appropriate: allergies, current medications, past family history, past medical history, past social history, past surgical history and problem list.    Review of Systems:   A 14 point review of systems  "was performed. All systems negative except pertinent positives/negative listed in HPI above    Physical Exam:   Vitals:    05/06/25 1550   Temp: 98.4 °F (36.9 °C)   TempSrc: Temporal   Weight: 82.6 kg (182 lb 3.2 oz)   Height: 180.3 cm (70.98\")   PainSc: 4    PainLoc: Knee       General: A and O x 3, ASA, NAD    SCLERA:    Normal    DENTITION:   Normal  Exam unchanged    Right knee MRI:     Narrative & Impression   MRI KNEE RIGHT  WO CONTRAST     Date of Exam: 4/29/2025 3:52 PM EDT     Indication: Chronic right knee pain rule out internal derangement.     Comparison: MRI 11/23/2020.     Technique:  Routine multiplanar/multisequence images of the right knee were obtained without contrast administration.        Findings:  Osseous Structures and Intra-Articular:  There is focal subchondral edema at the central weightbearing medial femoral condyle. There appears to be overlying high-grade partial and full-thickness cartilage loss and there is slight irregularity of the subchondral bone plate in this location. No   subchondral fracture line is seen. A small focus of subchondral edema with small overlying cartilage defect were present in this location on the MRI from 2020 suggesting interval progression of focal chondromalacia and underlying marrow changes.     There is also a small focus of subchondral edema signal at the lateral trochlea as seen on sagittal series 20 image 17, which is new since the prior exam. No findings of acute fracture.     No significant joint effusion.     Ligaments:  The anterior cruciate ligament and posterior cruciate ligaments appear intact.  Medial collateral ligament and lateral collateral ligament complex appear intact.     Menisci:  There is new intermediate signal and distortion of the anterior horn medial meniscus likely related to degenerative fraying/tear. No other significant new focal medial meniscus defect is identified.     No definite new lateral meniscus defect.     Extensor " compartment:  Quadriceps and patellar tendons appear intact.  Patella appears normally aligned.     Cartilage:  There is focal high-grade partial and small full-thickness cartilage defects at the central and medial aspect of the weightbearing medial femoral condyle. The involved area measures up to 3 cm anterior to posterior and 1.5 cm medial to lateral. There is   underlying heterogeneous subchondral edema and slight irregularity of the central subchondral bone plate. Cartilage defects in this location have progressed since 2020. There otherwise appears to be mild chondromalacia of the weightbearing medial   tibiofemoral compartment. Lateral tibiofemoral cartilage appears intact.     There is a small focus of subchondral edema at the lateral trochlea. This is suspected to be related to a small overlying cartilage defect/fissure. No other significant patellofemoral cartilage defect.     Soft tissues:  Trace fluid in a Baker's cyst. No other definite focal soft tissue collection or mass.     Miscellaneous:  Popliteus tendon appears intact. No other definite tendon abnormality.         IMPRESSION:  Impression:  1.Focal high-grade partial and full-thickness cartilage defects at the central and medial aspect of the weightbearing medial femoral condyle with underlying subchondral edema and slight irregularity of the subchondral bone plate. Cartilage defects and   subchondral marrow edema in this location have progressed since 2020.  2.New degenerative fraying/tear of the anterior horn medial meniscus.  3.New small focus of subchondral edema at the lateral trochlea, suspected to be related to a small overlying cartilage defect/fissure.             Assessment/Plan:  Right knee osteoarthritis, knee pain    I discussed the findings with the patient.  Also reviewed the results with my sports partner and we both felt that arthroscopic procedure may not be of much benefit given amount of cartilage loss particular in the  weightbearing area of the knee.  I discussed this with the patient recommend continued conservative treatment.  At which time his or treatment fails and symptoms are too great in which they cause limitations in normal daily activity and decreased quality of life and surgical intervention such as knee replacement type surgery may be recommended.  At this time we will order some topical medication to help in symptom management.  Proper use of the topical medication was discussed.  Discussed activities and modifications as well as continued knee strengthening exercises.  Patient understanding agreeable to this will follow-up in 3 months if any change in interim he will let us know.

## 2025-05-08 DIAGNOSIS — F43.0 PANIC ATTACK AS REACTION TO STRESS: ICD-10-CM

## 2025-05-08 DIAGNOSIS — F98.8 ATTENTION DEFICIT DISORDER (ADD) WITHOUT HYPERACTIVITY: ICD-10-CM

## 2025-05-08 DIAGNOSIS — F41.0 PANIC ATTACK AS REACTION TO STRESS: ICD-10-CM

## 2025-05-09 DIAGNOSIS — M54.50 ACUTE BILATERAL LOW BACK PAIN WITHOUT SCIATICA: ICD-10-CM

## 2025-05-09 DIAGNOSIS — M25.561 RIGHT KNEE PAIN, UNSPECIFIED CHRONICITY: ICD-10-CM

## 2025-05-09 DIAGNOSIS — M17.11 PRIMARY OSTEOARTHRITIS OF RIGHT KNEE: Primary | ICD-10-CM

## 2025-05-09 RX ORDER — CELECOXIB 200 MG/1
200 CAPSULE ORAL 2 TIMES DAILY
Qty: 60 CAPSULE | Refills: 0 | Status: SHIPPED | OUTPATIENT
Start: 2025-05-09

## 2025-05-12 RX ORDER — ALPRAZOLAM 0.5 MG
0.5 TABLET ORAL NIGHTLY PRN
Qty: 30 TABLET | Refills: 0 | Status: SHIPPED | OUTPATIENT
Start: 2025-05-12

## 2025-05-12 RX ORDER — DEXTROAMPHETAMINE SACCHARATE, AMPHETAMINE ASPARTATE MONOHYDRATE, DEXTROAMPHETAMINE SULFATE AND AMPHETAMINE SULFATE 2.5; 2.5; 2.5; 2.5 MG/1; MG/1; MG/1; MG/1
10 CAPSULE, EXTENDED RELEASE ORAL EVERY MORNING
Qty: 30 CAPSULE | Refills: 0 | Status: SHIPPED | OUTPATIENT
Start: 2025-05-12

## 2025-05-31 DIAGNOSIS — M54.50 ACUTE BILATERAL LOW BACK PAIN WITHOUT SCIATICA: ICD-10-CM

## 2025-06-10 ENCOUNTER — OFFICE VISIT (OUTPATIENT)
Dept: ORTHOPEDIC SURGERY | Facility: CLINIC | Age: 45
End: 2025-06-10
Payer: OTHER GOVERNMENT

## 2025-06-10 VITALS — HEIGHT: 71 IN | TEMPERATURE: 98.3 F | BODY MASS INDEX: 26.15 KG/M2 | WEIGHT: 186.8 LBS

## 2025-06-10 DIAGNOSIS — M25.562 LEFT KNEE PAIN, UNSPECIFIED CHRONICITY: Primary | ICD-10-CM

## 2025-06-10 PROCEDURE — 99213 OFFICE O/P EST LOW 20 MIN: CPT | Performed by: ORTHOPAEDIC SURGERY

## 2025-06-10 RX ORDER — TIRZEPATIDE 7.5 MG/.5ML
INJECTION, SOLUTION SUBCUTANEOUS
COMMUNITY
Start: 2025-05-12

## 2025-06-10 NOTE — PROGRESS NOTES
General Exam    Patient: Dave Chavez    YOB: 1980    Medical Record Number: [id number]    Chief Complaints: Left knee pain and giving out    History of Present Illness:     44 y.o. male patient who presents for evaluation of left knee giving out and some pain.  States noticed recently the left knee, gives out cannot recall inciting event it just happens here and there.  Has some pain when it occurs.  Denies any recent injury.    Denies any numbness or tingling.  Denies any fevers, cough or shortness of breath.    Allergies:   Allergies   Allergen Reactions    Doxepin Mental Status Change     Groggy in morning    Penicillins Unknown - Low Severity     childhood       Home Medications:      Current Outpatient Medications:     allopurinol (Zyloprim) 300 MG tablet, Take 1 tablet by mouth Daily. Indications: Gout, Disp: 90 tablet, Rfl: 3    ALPRAZolam (XANAX) 0.5 MG tablet, Take 1 tablet by mouth At Night As Needed for Anxiety. for anxiety, Disp: 30 tablet, Rfl: 0    amphetamine-dextroamphetamine XR (Adderall XR) 10 MG 24 hr capsule, Take 1 capsule by mouth Every Morning Indications: Attention Deficit Hyperactivity Disorder, Disp: 30 capsule, Rfl: 0    celecoxib (CeleBREX) 200 MG capsule, Take 1 capsule by mouth 2 (Two) Times a Day., Disp: 60 capsule, Rfl: 0    cholestyramine (QUESTRAN) 4 g packet, TAKE 1 PACKET BY MOUTH DAILY WITH LUNCH. INDICATIONS: DIARRHEA DUE TO BILE ACIDS, Disp: 90 packet, Rfl: 3    cloNIDine (CATAPRES) 0.1 MG tablet, TAKE 1 TO 2 TABLETS BY MOUTH EVERY DAY AS NEEDED FOR PANIC OR SLEEP, Disp: , Rfl:     colchicine 0.6 MG tablet, TAKE 2 PO WITH GOUT FLARE AND CAN REPEAT IN 6 HOURS IF NEEDED., Disp: 30 tablet, Rfl: 5    EPINEPHrine (EPIPEN) 0.3 MG/0.3ML solution auto-injector injection, Inject 0.3 mL into the appropriate muscle as directed by prescriber 1 (One) Time., Disp: , Rfl:     ezetimibe (ZETIA) 10 MG tablet, Take 1 tablet by mouth Daily., Disp: 90 tablet, Rfl: 4    hydrOXYzine  (ATARAX) 10 MG tablet, TAKE 1 TABLET BY MOUTH EVERY NIGHT. INDICATIONS: SLEEP, Disp: 90 tablet, Rfl: 3    Ibuprofen 3 %, Gabapentin 10 %, Baclofen 2 %, lidocaine 4 %, Apply 1-2 g topically to the appropriate area as directed 3 (Three) to 4 (Four) times daily., Disp: 90 g, Rfl: 2    losartan (Cozaar) 100 MG tablet, Take 1 tablet by mouth Daily. Indications: High Blood Pressure, Disp: 90 tablet, Rfl: 3    naltrexone (DEPADE) 50 MG tablet, , Disp: , Rfl:     omeprazole (priLOSEC) 20 MG capsule, Take 1 capsule by mouth Daily. Indications: Heartburn, Disp: 90 capsule, Rfl: 1    ondansetron ODT (ZOFRAN-ODT) 4 MG disintegrating tablet, Place 1 tablet on the tongue Every 8 (Eight) Hours As Needed for Nausea or Vomiting. Indications: Nausea and Vomiting, Disp: 30 tablet, Rfl: 2    rosuvastatin (CRESTOR) 20 MG tablet, Take 1 tablet by mouth Daily., Disp: 90 tablet, Rfl: 3    SUMAtriptan (IMITREX) 100 MG tablet, TAKE ONE TABLET AT ONSET OF HEADACHE. MAY REPEAT DOSE ONE TIME IN 2 HOURS IF HEADACHE NOT RELIEVED., Disp: 9 tablet, Rfl: 0    testosterone (ANDROGEL) 50 MG/5GM (1%) gel gel, Place 50 g on skin daily as directed. Dx  Indications: Deficient Activity of the Testis, Disp: 90 each, Rfl: 1    Tirzepatide-Weight Management (ZEPBOUND) 5 MG/0.5ML solution auto-injector, Inject 0.5 mL under the skin into the appropriate area as directed 1 (One) Time Per Week for 24 doses., Disp: 2 mL, Rfl: 5    tiZANidine (ZANAFLEX) 4 MG tablet, TAKE 1 TABLET BY MOUTH EVERY 8 HOURS AS NEEDED FOR MUSCLE SPASMS., Disp: 30 tablet, Rfl: 0    Zepbound 7.5 MG/0.5ML solution auto-injector, , Disp: , Rfl:     Past Medical History:   Diagnosis Date    ADHD (attention deficit hyperactivity disorder) 07/25/2018    Treated with straterra but medication made me feel nauseous    Allergic 01/01/1991    Currently taking allergy shots 1x/wk for seasonal allergies    Ankle sprain 2002    Anxiety 02/01/2018    Treated with paxil and resolved around 5/1/2018     Arthritis 2014    Noted in left knee and right large toe    Cholelithiasis 2023    Removed    Depression 2018    Treated with paxil and resolved around 2018    GERD (gastroesophageal reflux disease)     Treated with OTC meds    Headache     Treated with ibuprofen and rest    Hyperlipidemia     Treated with medication    Hypertension 2015    Never treated with medication    Irritable bowel syndrome 2018    Prescribed Bentyl    Kidney stone 2001    Hx of kidney stones.  Treated at [facility].    S/P laparoscopic cholecystectomy 2023    Stress fracture 10/2000    During [employer] training    Tennis elbow 10/2020    Both elbows       Past Surgical History:   Procedure Laterality Date    ADENOIDECTOMY  2017    CHOLECYSTECTOMY  2023    Removed    KIDNEY STONE SURGERY      TONSILLECTOMY      TRIGGER POINT INJECTION      Right knee    VASECTOMY      [facility]       Social History     Occupational History     Employer:  [employer]   Tobacco Use    Smoking status: Former     Current packs/day: 0.00     Average packs/day: 0.5 packs/day for 14.9 years (7.5 ttl pk-yrs)     Types: Cigarettes     Start date: 2002     Quit date: 2016     Years since quittin.9    Smokeless tobacco: Never    Tobacco comments:     Quit using lozenges and the 1-800-Quit-Now phone line   Vaping Use    Vaping status: Never Used   Substance and Sexual Activity    Alcohol use: Yes     Alcohol/week: 13.0 standard drinks of alcohol     Types: 2 Glasses of wine, 6 Cans of beer, 5 Shots of liquor per week    Drug use: No    Sexual activity: Yes     Partners: Female     Birth control/protection: Vasectomy     Comment: Vasectomy      Social History     Social History Narrative    Not on file       Family History   Problem Relation Age of Onset    Anxiety disorder Mother     Heart disease Mother     Depression Mother     Diabetes Mother     Kidney disease Mother      "Arthritis Father     Hyperlipidemia Father     Alcohol abuse Father         Stopped by using naltrexone.    Depression Father     Mental illness Father     No Known Problems Brother     Arthritis Paternal Grandmother     Hyperlipidemia Paternal Grandmother     Hearing loss Paternal Grandmother     No Known Problems Daughter     No Known Problems Daughter        Review of Systems:      Constitutional: Denies fever, shaking or chills         All other pertinent positives and negatives as noted above in HPI.    Physical Exam: 44 y.o. male    Vitals:    06/10/25 1523   Temp: 98.3 °F (36.8 °C)   TempSrc: Temporal   Weight: 84.7 kg (186 lb 12.8 oz)   Height: 180.3 cm (71\")       General:  Patient is awake and alert.  Appears in no acute distress or discomfort.      Musculoskeletal/Extremities:    Left knee examined no tenderness palpation full painless range of motion stable varus valgus rest.  Muscle strength 4 out of 5 to all muscle groups tested         Radiology:       Previous films reviewed to evaluate the patient's complaint/s.      Assessment: Left knee pain, weakness      Plan:      I discussed the findings with the patient.  I think you benefit from some therapy to strengthen the muscles about the knee.  Conservative treatments over-the-counter rest ice activity modifications.  Plan to see the patient back in 6 weeks for further evaluation if needed.           We will plan for follow up as above.    All questions were answered.  Patient understands and agrees with the plan.    Frankie Elizondo MD    06/10/2025    CC to Gaurav Cleaning MD       "

## 2025-06-11 ENCOUNTER — TELEPHONE (OUTPATIENT)
Dept: FAMILY MEDICINE CLINIC | Facility: CLINIC | Age: 45
End: 2025-06-11
Payer: OTHER GOVERNMENT

## 2025-06-11 DIAGNOSIS — M25.562 ACUTE PAIN OF LEFT KNEE: Primary | ICD-10-CM

## 2025-06-11 NOTE — TELEPHONE ENCOUNTER
Patient called and stated that ortho has placed a referral for PT for his left knee, but his insurance is requiring a referral from his PCP.   Patient stated that he would like to go to PT in [place] (next door) if possible.   Please advise.     Ok will do  PT for the knee

## 2025-06-12 DIAGNOSIS — F43.0 PANIC ATTACK AS REACTION TO STRESS: ICD-10-CM

## 2025-06-12 DIAGNOSIS — F41.0 PANIC ATTACK AS REACTION TO STRESS: ICD-10-CM

## 2025-06-13 ENCOUNTER — PATIENT MESSAGE (OUTPATIENT)
Dept: FAMILY MEDICINE CLINIC | Facility: CLINIC | Age: 45
End: 2025-06-13
Payer: OTHER GOVERNMENT

## 2025-06-13 DIAGNOSIS — F32.A ANXIETY AND DEPRESSION: Primary | ICD-10-CM

## 2025-06-13 DIAGNOSIS — F41.9 ANXIETY AND DEPRESSION: Primary | ICD-10-CM

## 2025-06-17 DIAGNOSIS — R79.89 LOW TESTOSTERONE IN MALE: ICD-10-CM

## 2025-06-18 ENCOUNTER — TREATMENT (OUTPATIENT)
Dept: PHYSICAL THERAPY | Facility: CLINIC | Age: 45
End: 2025-06-18
Payer: OTHER GOVERNMENT

## 2025-06-18 DIAGNOSIS — M25.562 ACUTE PAIN OF LEFT KNEE: ICD-10-CM

## 2025-06-18 DIAGNOSIS — M25.561 CHRONIC PAIN OF RIGHT KNEE: ICD-10-CM

## 2025-06-18 DIAGNOSIS — G89.29 CHRONIC PAIN OF RIGHT KNEE: ICD-10-CM

## 2025-06-18 DIAGNOSIS — M25.362 INSTABILITY OF LEFT KNEE JOINT: Primary | ICD-10-CM

## 2025-06-18 PROCEDURE — 97161 PT EVAL LOW COMPLEX 20 MIN: CPT | Performed by: PHYSICAL THERAPIST

## 2025-06-18 PROCEDURE — 97530 THERAPEUTIC ACTIVITIES: CPT | Performed by: PHYSICAL THERAPIST

## 2025-06-18 PROCEDURE — 97110 THERAPEUTIC EXERCISES: CPT | Performed by: PHYSICAL THERAPIST

## 2025-06-18 NOTE — PROGRESS NOTES
Physical Therapy Initial Evaluation and Plan of Care    [facility]  [address] [id number] [phone] (phone)  [phone] (fax)    Patient: Dave Chavez   : 1980  Diagnosis/ICD-10 Code:  Instability of left knee joint [M25.362]  Referring practitioner: Gaurav Cleaning MD  Date of Initial Visit: 2025  Today's Date:  2025  Patient seen for 1 sessions           Past Medical History:   Diagnosis Date    ADHD (attention deficit hyperactivity disorder) 2018    Treated with straterra but medication made me feel nauseous    Allergic 1991    Currently taking allergy shots 1x/wk for seasonal allergies    Ankle sprain     Anxiety 2018    Treated with paxil and resolved around 2018    Arthritis 2014    Noted in left knee and right large toe    Cholelithiasis 2023    Removed    Depression 2018    Treated with paxil and resolved around 2018    GERD (gastroesophageal reflux disease)     Treated with OTC meds    Headache     Treated with ibuprofen and rest    Hyperlipidemia     Treated with medication    Hypertension 2015    Never treated with medication    Irritable bowel syndrome 2018    Prescribed Bentyl    Kidney stone 2001    Hx of kidney stones.  Treated at [facility].    S/P laparoscopic cholecystectomy 2023    Stress fracture 10/2000    During [employer] training    Tennis elbow 10/2020    Both elbows        Past Surgical History:   Procedure Laterality Date    ADENOIDECTOMY  2017    CHOLECYSTECTOMY  2023    Removed    KIDNEY STONE SURGERY      TONSILLECTOMY      TRIGGER POINT INJECTION      Right knee    VASECTOMY  2015    [facility]        Subjective Evaluation    History of Present Illness  Mechanism of injury: Patient complains of B knee pain. L knee Pain began about 3 weeks ago when it suddenly buckled on him in [employer]. Since then patient recalls one other  incidence of his L knee buckling on him.  No known recent injury and no numbness and tingling.     Knee pain worsens with stairs and transfers.     Patient's PMH is significant for R knee pain that is chronic. Patient reports the R knee is a candidate for a TKA but he's managing it with Celebrex and injections.     PMH: R knee OA    PLOF: Used to walk more for leisure      Patient Occupation: - desk job Quality of life: good    Pain  Current pain ratin  At best pain ratin  At worst pain ratin  Location: L knee  Quality: sharp  Relieving factors: relaxation, rest, support and ice  Aggravating factors: stairs, ambulation and squatting  Progression: no change    Social Support  Lives in: multiple-level home  Lives with: spouse and young children    Diagnostic Tests  X-ray: normal    Treatments  Previous treatment: medication  Patient Goals  Patient goals for therapy: increased strength, decreased pain, increased motion, improved balance and return to sport/leisure activities               Objective          Neurological Testing     Sensation     Lumbar   Left   Intact: light touch    Right   Intact: light touch    Active Range of Motion   Left Knee   Flexion: 130 degrees   Extension: 0 degrees     Right Knee   Flexion: 135 degrees with pain  Extension: 0 degrees     Patellar Mobility   Left Knee Patellar tendons within functional limits include the medial, lateral, superior and inferior.     Right Knee Patellar tendons within functional limits include the medial, lateral, superior and inferior.     Strength/Myotome Testing     Left Hip   Planes of Motion   Flexion: 4+  Abduction: 4-  External rotation: 4    Right Hip   Planes of Motion   Flexion: 4+  Abduction: 4  External rotation: 4+    Left Knee   Flexion: 4+  Extension: 4+    Right Knee   Flexion: 4-  Extension: 4+    Tests     Left Knee   Positive posterior drawer.   Negative anterior drawer, lateral Kusum, medial Kusum, valgus stress  test at 0 degrees and varus stress test at 0 degrees.     Right Knee   Positive posterior drawer.   Negative anterior drawer, lateral Kusum, medial Kusum, valgus stress test at 0 degrees and varus stress test at 0 degrees.     Additional Tests Details  Mild laxity B with posterior drawer     Swelling     Left Knee Girth Measurement (cm)   Joint line: 34 cm    Right Knee Girth Measurement (cm)   Joint line: 34 cm    Ambulation     Comments   Normal gait, no notable antalgia        See Exercise, Manual, and Modality Logs for complete treatment.       Functional Outcome Score:  LEFS 55/80         Assessment & Plan       Assessment  Impairments: abnormal gait, abnormal muscle firing, abnormal or restricted ROM, activity intolerance, impaired balance, impaired physical strength and pain with function   Functional limitations: sleeping, walking, uncomfortable because of pain, sitting and standing   Assessment details: Dave Chavez is a 44 y.o. year-old male referred to physical therapy for B knee pain. R knee is a TKA candidate and L knee has buckled recently due to unknown cause. He presents with a evolving clinical presentation.  He has no relevant co morbidities. Personal factors include having stairs at home which may affect his progress in the plan of care.  Signs and symptoms are consistent with physical therapy diagnosis of B knee pain and difficulty with stairs. Pt was educated on course of treatment, possible reasons for knee pain, activity modifications, and use of ice/heat PRN. Pt was given a copy of HEP. Patient is appropriate for skilled physical therapy in order to reduce pain and increase ease with daily mobility.   Barriers to therapy: none identified  Prognosis: good    Goals  Plan Goals: STGs to be completed within 30 days:  -Patient will demonstrate compliance and independence with initial HEP  -Patient will perform sit to stand transfer with equilateral WB and no UE assistance  -Patient will  navigate up the stairs without reliance on railing to indicate improvement in concentric quad strength    LTGs to be completed within 90 days:  -Patient will complete transfers without UE support to indicate improvement in functional strength  -Patient will navigate down the stairs without reliance on railing to indicate improvement in eccentric quad strength  -Patient will improve score on LEFS from 55 at eval to 70 or greater to improve quality of life    Plan  Therapy options: will be seen for skilled therapy services  Planned modality interventions: TENS, ultrasound, electrical stimulation/Russian stimulation, cryotherapy, dry needling, iontophoresis and thermotherapy (hydrocollator packs)  Planned therapy interventions: joint mobilization, stretching, strengthening, therapeutic activities, transfer training, postural training, manual therapy, ADL retraining, balance/weight-bearing training, dressing changes, flexibility, functional ROM exercises, gait training, home exercise program, neuromuscular re-education and motor coordination training  Other planned therapy interventions: Aquatic Therapy  Frequency: 2x week (36 visits)  Treatment plan discussed with: patient  Plan details: Gait training, stairs, Balance, Knee ROM/strength, LE stability        Timed:  Manual Therapy:         mins  61412;  Therapeutic Exercise:    15     mins  38182;     Neuromuscular Thierry:        mins  41632;    Therapeutic Activity:     10     mins  25520;     Gait Training:           mins  04530;     Ultrasound:          mins  06451;    Iontophoresis         mins 89033;  Self Care         mins 24028    Untimed:  Electrical Stimulation:         mins  80169 ( );  Traction:       mins  21500;   Dry Needling   (1-2 muscles)   _     mins 34983 (Self-pay)  Dry Needling (3-4 muscles)  _     mins 99941 (Self-pay)  Dry Needling Trial    _     mins DRYNDLTRIAL  (No Charge)  Low Eval     15     Mins  26160  Mod Eval          Mins   78551  High Eval                            Mins  41508  Re- Eval                           Mins  50071    Timed Treatment:   25   mins   Total Treatment:     40   mins    PT SIGNATURE: Cher Anand PT     License Number: [place] [id number]    Electronically signed by Cher Anand PT, 06/18/25, 1:25 PM EDT    DATE TREATMENT INITIATED: 6/18/2025    Initial Certification  Certification Period: 9/16/2025  I certify that the therapy services are furnished while this patient is under my care.  The services outlined above are required by this patient, and will be reviewed every 90 days.     PHYSICIAN: Gaurav Cleaning MD   NPI: [id number]                                         DATE:     Please sign and return via fax to [phone] Thank you, [facility].

## 2025-06-18 NOTE — TELEPHONE ENCOUNTER
Pt requested to have pressures lowered. Pt feels like the air is blowing too hard and he has to take the mask off at times. Tech pulled a download and will have  to review.   
No

## 2025-06-19 ENCOUNTER — OFFICE VISIT (OUTPATIENT)
Dept: FAMILY MEDICINE CLINIC | Facility: CLINIC | Age: 45
End: 2025-06-19
Payer: OTHER GOVERNMENT

## 2025-06-19 VITALS
HEIGHT: 71 IN | HEART RATE: 94 BPM | OXYGEN SATURATION: 98 % | TEMPERATURE: 98.1 F | DIASTOLIC BLOOD PRESSURE: 80 MMHG | WEIGHT: 181 LBS | SYSTOLIC BLOOD PRESSURE: 110 MMHG | BODY MASS INDEX: 25.34 KG/M2

## 2025-06-19 DIAGNOSIS — Z79.899 HIGH RISK MEDICATIONS (NOT ANTICOAGULANTS) LONG-TERM USE: ICD-10-CM

## 2025-06-19 DIAGNOSIS — M10.072 ACUTE IDIOPATHIC GOUT INVOLVING TOE OF LEFT FOOT: ICD-10-CM

## 2025-06-19 DIAGNOSIS — G43.009 MIGRAINE WITHOUT AURA AND WITHOUT STATUS MIGRAINOSUS, NOT INTRACTABLE: Primary | ICD-10-CM

## 2025-06-19 DIAGNOSIS — I10 PRIMARY HYPERTENSION: ICD-10-CM

## 2025-06-19 DIAGNOSIS — M54.50 ACUTE BILATERAL LOW BACK PAIN WITHOUT SCIATICA: ICD-10-CM

## 2025-06-19 DIAGNOSIS — R79.89 LOW TESTOSTERONE IN MALE: ICD-10-CM

## 2025-06-19 DIAGNOSIS — F98.8 ATTENTION DEFICIT DISORDER (ADD) WITHOUT HYPERACTIVITY: ICD-10-CM

## 2025-06-19 DIAGNOSIS — E78.2 MIXED HYPERLIPIDEMIA: ICD-10-CM

## 2025-06-19 DIAGNOSIS — Z79.890 LONG-TERM CURRENT USE OF TESTOSTERONE REPLACEMENT THERAPY: ICD-10-CM

## 2025-06-19 DIAGNOSIS — Z00.00 ROUTINE ADULT HEALTH MAINTENANCE: ICD-10-CM

## 2025-06-19 RX ORDER — CELECOXIB 200 MG/1
200 CAPSULE ORAL DAILY
Qty: 90 CAPSULE | Refills: 3 | Status: SHIPPED | OUTPATIENT
Start: 2025-06-19

## 2025-06-19 RX ORDER — DEXTROAMPHETAMINE SACCHARATE, AMPHETAMINE ASPARTATE MONOHYDRATE, DEXTROAMPHETAMINE SULFATE AND AMPHETAMINE SULFATE 2.5; 2.5; 2.5; 2.5 MG/1; MG/1; MG/1; MG/1
10 CAPSULE, EXTENDED RELEASE ORAL EVERY MORNING
Qty: 30 CAPSULE | Refills: 0 | Status: SHIPPED | OUTPATIENT
Start: 2025-06-19

## 2025-06-19 RX ORDER — SUMATRIPTAN SUCCINATE 100 MG/1
TABLET ORAL
Qty: 9 TABLET | Refills: 11 | Status: SHIPPED | OUTPATIENT
Start: 2025-06-19

## 2025-06-19 RX ORDER — ALPRAZOLAM 0.5 MG
0.5 TABLET ORAL NIGHTLY PRN
Qty: 30 TABLET | Refills: 5 | Status: SHIPPED | OUTPATIENT
Start: 2025-06-19

## 2025-06-19 NOTE — PROGRESS NOTES
"  Chief Complaint   Patient presents with    Annual Exam       Subjective   Dave Chavez is a 44 y.o. male and is here for a yearly physical exam. The patient reports no problems.    Do you take any herbs or supplements that were not prescribed by a doctor? yes. If so, these will be added to active medication list.    The following portions of the patient's history were reviewed and updated as appropriate: allergies, current medications, past family history, past medical history, past social history, past surgical history, and problem list.    Social and Family and Surgical History reviewed and updated today.    Health and Surgical History, Preventive Measures and Vaccination flow sheets reviewed and updated today.    Patient's current medical chart in Epic; including previous office notes, Mychart messages, recent phone calls, imaging, labs, specialist's evaluation either in notes or in Media tab reviewed today.    Other outside pertinent medical information also reviewed thru Care Everywhere function is also reviewed today.    Review of Systems  Review of Systems  Pertinent items are noted in HPI.    Vitals:    06/19/25 0853   BP: 110/80   BP Location: Left arm   Patient Position: Sitting   Cuff Size: Adult   Pulse: 94   Temp: 98.1 °F (36.7 °C)   SpO2: 98%   Weight: 82.1 kg (181 lb)   Height: 180.3 cm (71\")     Body mass index is 25.24 kg/m².          General Appearance:  Alert, cooperative, no distress, appears stated age   Head:  Normocephalic, without obvious abnormality, atraumatic   Eyes:  PERRL, conjunctiva/corneas clear, EOM's intact.   Ears:  Normal TM's and external ear canals, both ears   Nose: Nares normal, septum midline, mucosa normal, no drainage or sinus tenderness   Throat: Lips, mucosa, and tongue normal; teeth and gums viewed   Neck: Supple, symmetrical,  no adenopathy;   thyroid: No enlargement/tenderness/nodules;   no carotid bruit   Back:  Symmetric, no curvature, ROM normal, no CVA tenderness "   Lungs:  Clear to auscultation bilaterally, respirations unlabored   Chest wall:  No tenderness or deformity   Heart:  Regular rate and rhythm, S1 and S2 normal, no murmur.   Abdomen:  Soft, non-tender, bowel sounds active all four quadrants,   no masses, no organomegaly   Rectal:        Extremities: Extremities normal, atraumatic, no cyanosis or edema   Pulses: 2+ and symmetric all extremities   Skin: Skin color, texture, turgor normal, no rashes. No suspicious lesions   Lymph nodes: Cervical, supraclavicular, and axillary nodes normal   Neurologic: CNII-XII intact. Normal strength, sensation.          Results for orders placed or performed in visit on 05/01/25   Comprehensive Metabolic Panel    Collection Time: 06/11/25 10:40 AM    Specimen: Blood   Result Value Ref Range    Glucose 87 65 - 99 mg/dL    BUN 12.0 6.0 - 20.0 mg/dL    Creatinine 1.20 0.76 - 1.27 mg/dL    EGFR Result 77.0 >60.0 mL/min/1.73    BUN/Creatinine Ratio 10.0 7.0 - 25.0    Sodium 137 136 - 145 mmol/L    Potassium 4.5 3.5 - 5.2 mmol/L    Chloride 99 98 - 107 mmol/L    Total CO2 25.4 22.0 - 29.0 mmol/L    Calcium 9.7 8.6 - 10.5 mg/dL    Total Protein 7.2 6.0 - 8.5 g/dL    Albumin 4.9 3.5 - 5.2 g/dL    Globulin 2.3 gm/dL    A/G Ratio 2.1 g/dL    Total Bilirubin 0.4 0.0 - 1.2 mg/dL    Alkaline Phosphatase 95 39 - 117 U/L    AST (SGOT) 35 1 - 40 U/L    ALT (SGPT) 33 1 - 41 U/L   Lipid Panel    Collection Time: 06/11/25 10:40 AM    Specimen: Blood   Result Value Ref Range    Total Cholesterol 162 0 - 200 mg/dL    Triglycerides 260 (H) 0 - 150 mg/dL    HDL Cholesterol 73 (H) 40 - 60 mg/dL    VLDL Cholesterol Erick 40 5 - 40 mg/dL    LDL Chol Calc (NIH) 49 0 - 100 mg/dL   Testosterone, Free, Total    Collection Time: 06/11/25 10:40 AM    Specimen: Blood   Result Value Ref Range    Testosterone, Total 335 264 - 916 ng/dL    Testosterone, Free 20.2 6.8 - 21.5 pg/mL   Uric Acid    Collection Time: 06/11/25 10:40 AM    Specimen: Blood   Result Value Ref  Range    Uric Acid 3.3 (L) 3.4 - 7.0 mg/dL     Assessment & Plan   Healthy male exam.  Diagnoses and all orders for this visit:    1. Migraine without aura and without status migrainosus, not intractable (Primary)  -     SUMAtriptan (IMITREX) 100 MG tablet; Take one tablet at onset of headache. May repeat dose one time in 2 hours if headache not relieved.  Dispense: 9 tablet; Refill: 11    2. Acute bilateral low back pain without sciatica  -     celecoxib (CeleBREX) 200 MG capsule; Take 1 capsule by mouth Daily.  Dispense: 90 capsule; Refill: 3    3. Routine adult health maintenance    4. Attention deficit disorder (ADD) without hyperactivity  -     amphetamine-dextroamphetamine XR (Adderall XR) 10 MG 24 hr capsule; Take 1 capsule by mouth Every Morning Indications: Attention Deficit Hyperactivity Disorder  Dispense: 30 capsule; Refill: 0    5. Acute idiopathic gout involving toe of left foot  -     Uric Acid; Future    6. Mixed hyperlipidemia  -     Lipid Panel; Future    7. High risk medications (not anticoagulants) long-term use  -     Compliance Drug Analysis, Ur - Urine, Clean Catch; Future    8. Low testosterone in male  -     Testosterone, Free, Total; Future    9. Primary hypertension  -     CBC (No Diff); Future  -     Comprehensive Metabolic Panel; Future  -     Lipid Panel; Future  -     Compliance Drug Analysis, Ur - Urine, Clean Catch; Future    10. Long-term current use of testosterone replacement therapy  -     PSA DIAGNOSTIC; Future      1. .  Labs reviewed  Gout level has improved  Testosterone is steady.  Will need to adjust the testosterone supply to the tube, suppliers out of the individual packets  ADD is stable on Adderall  Generalized anxiety disorder with panic stable on Xanax therapy  Is tolerating the 5 mg of Zepbound fairly well, continuing to lose weight  Cholesterol well-controlled on Crestor and Zetia  Still having 2 migraines a month, taking Imitrex    2. Patient  Counseling:  --Nutrition: Stressed importance of moderation in: sodium, caffeine, , saturated fat and cholesterol.  Discussed: caloric balance, sufficient intake of fresh fruits, vegetables, fiber, calcium, iron.  --Exercise: Stressed the importance of regular exercise.   --Substance Abuse: Discussed cessation and or reduction of tobacco, alcohol, or other drug use; driving or other dangerous activities under the influence.    --Dental health: Discussed importance of regular tooth brushing, flossing, and dental visits.  --Suggested having eyes and vision checked if needed or past due.  --Immunizations reviewed and given if needed.  --Discussed benefits of screening colonoscopy and or ColoGuard.  3. Discussed the patient's BMI with him.  The BMI is in the acceptable range  4. Follow up in 6 months    There are no Patient Instructions on file for this visit.    Medications Discontinued During This Encounter   Medication Reason    SUMAtriptan (IMITREX) 100 MG tablet Reorder    celecoxib (CeleBREX) 200 MG capsule Reorder    amphetamine-dextroamphetamine XR (Adderall XR) 10 MG 24 hr capsule Reorder        Dr. Gaurav Cleaning MD  [place], [place]  [facility]

## 2025-07-07 ENCOUNTER — TREATMENT (OUTPATIENT)
Dept: PHYSICAL THERAPY | Facility: CLINIC | Age: 45
End: 2025-07-07
Payer: OTHER GOVERNMENT

## 2025-07-07 DIAGNOSIS — G89.29 CHRONIC PAIN OF RIGHT KNEE: ICD-10-CM

## 2025-07-07 DIAGNOSIS — M25.562 ACUTE PAIN OF LEFT KNEE: Primary | ICD-10-CM

## 2025-07-07 DIAGNOSIS — M25.362 INSTABILITY OF LEFT KNEE JOINT: ICD-10-CM

## 2025-07-07 DIAGNOSIS — M25.561 CHRONIC PAIN OF RIGHT KNEE: ICD-10-CM

## 2025-07-07 PROCEDURE — 97110 THERAPEUTIC EXERCISES: CPT | Performed by: PHYSICAL THERAPIST

## 2025-07-07 PROCEDURE — 97530 THERAPEUTIC ACTIVITIES: CPT | Performed by: PHYSICAL THERAPIST

## 2025-07-07 NOTE — PROGRESS NOTES
Physical Therapy Daily Treatment Note  [facility]  [address], [place] [id number] [phone] (phone)  [phone] (fax)    Patient: Dave Chavez   : 1980  Diagnosis/ICD-10 Code:  Acute pain of left knee [M25.562]  Referring practitioner: Gaurav Cleaning MD  Date of Initial Visit: Type: THERAPY  Noted: 2025  Today's Date: 2025  Patient seen for 2 sessions       Dave Chavez reports: Both knees still hurt. Haven't been great about doing exercises, was on vacation last week.     Subjective     Objective   See Exercise, Manual, and Modality Logs for complete treatment.       Assessment/Plan  Subjectively, pt reports no increase of pain or discomfort with interventions performed today. Performed well with review of HEP with modifications of form where needed. Added step ups and lateral step downs, lateral walks and monster walks for increased hip strengthening to further improve knee stability.  Continues to benefit from verbal/tactile cues to ensure proper form and technique for exercise performance.     Progress per Plan of Care           Manual Therapy:         mins  93041;  Therapeutic Exercise:    20     mins  53801;     Neuromuscular Thierry:        mins  56821;    Therapeutic Activity:     15     mins  41651;     Gait Training:           mins  25106;     Ultrasound:          mins  52489;    Electrical Stimulation:         mins  67202;  Traction          mins 49251    Timed Treatment:   35   mins   Total Treatment:     35   mins    Olena Neely PTA  Physical Therapist Assistant [id number]

## 2025-07-08 DIAGNOSIS — M54.50 ACUTE BILATERAL LOW BACK PAIN WITHOUT SCIATICA: ICD-10-CM

## 2025-07-08 RX ORDER — CELECOXIB 200 MG/1
200 CAPSULE ORAL 2 TIMES DAILY
Qty: 60 CAPSULE | Refills: 1 | Status: SHIPPED | OUTPATIENT
Start: 2025-07-08

## 2025-07-09 DIAGNOSIS — M54.50 ACUTE BILATERAL LOW BACK PAIN WITHOUT SCIATICA: ICD-10-CM

## 2025-07-14 ENCOUNTER — TREATMENT (OUTPATIENT)
Dept: PHYSICAL THERAPY | Facility: CLINIC | Age: 45
End: 2025-07-14
Payer: OTHER GOVERNMENT

## 2025-07-14 ENCOUNTER — TELEPHONE (OUTPATIENT)
Dept: FAMILY MEDICINE CLINIC | Facility: CLINIC | Age: 45
End: 2025-07-14

## 2025-07-14 DIAGNOSIS — M25.362 INSTABILITY OF LEFT KNEE JOINT: ICD-10-CM

## 2025-07-14 DIAGNOSIS — M25.561 CHRONIC PAIN OF RIGHT KNEE: ICD-10-CM

## 2025-07-14 DIAGNOSIS — J30.89 ENVIRONMENTAL AND SEASONAL ALLERGIES: Primary | ICD-10-CM

## 2025-07-14 DIAGNOSIS — M25.562 ACUTE PAIN OF LEFT KNEE: Primary | ICD-10-CM

## 2025-07-14 DIAGNOSIS — G89.29 CHRONIC PAIN OF RIGHT KNEE: ICD-10-CM

## 2025-07-14 PROCEDURE — 97112 NEUROMUSCULAR REEDUCATION: CPT | Performed by: PHYSICAL THERAPIST

## 2025-07-14 PROCEDURE — 97530 THERAPEUTIC ACTIVITIES: CPT | Performed by: PHYSICAL THERAPIST

## 2025-07-14 PROCEDURE — 97110 THERAPEUTIC EXERCISES: CPT | Performed by: PHYSICAL THERAPIST

## 2025-07-14 NOTE — PROGRESS NOTES
Physical Therapy Daily Treatment Note    [facility]  [address] [place] [address]  [phone] (phone)  [phone] (fax)    Patient: Dave Chavez   : 1980  Diagnosis/ICD-10 Code:  Acute pain of left knee [M25.562]  Referring practitioner: Gaurav Cleaning MD  Date of Initial Visit: Type: THERAPY  Noted: 2025  Today's Date:  2025  Patient seen for 3 sessions           Subjective   Woke up hurting all over today. Even my back is hurting. Knees are feeling about the same overall.    Objective     See Exercise, Manual, and Modality Logs for complete treatment.     Assessment/Plan  Progressed quad strength by adding leg press. Cued patient for slow and controlled movement and emphasized eccentric portion of exercise. Added squats within comfortable ROM and standing hip abduction kicks to promote functional strength. Patient is tolerating all the exercises well but feels like he will still likely need a knee replacement in the near future.          Timed:    Manual Therapy:         mins  40009;  Therapeutic Exercise:    22     mins  45908;     Neuromuscular Thierry:   8     mins  90016;    Therapeutic Activity:     10     mins  57469;     Gait Training:           mins  43357;     Ultrasound:          mins  97173;    Electrical Stimulation:         mins  13790 ( );  Iontophoresis         mins 63322;  Aquatic Therapy         mins 57161;  Self Care                           mins 10452     Untimed:  Electrical Stimulation:         mins  40369 ( );  Traction:         mins  08752;   Dry Needling   (1-2 muscles)       mins  (Self-pay)  Dry Needling (3-4 muscles)        mins  (Self-pay)  Dry Needling Trial          mins DRYNDLTRIAL  (No Charge)    Timed Treatment:   40   mins   Total Treatment:     40   mins    Cher Anand PT  Physical Therapist    [place] License:[id number]

## 2025-07-15 DIAGNOSIS — M17.11 PRIMARY OSTEOARTHRITIS OF RIGHT KNEE: Primary | ICD-10-CM

## 2025-07-15 RX ORDER — DIAZEPAM 5 MG/1
5 TABLET ORAL ONCE
Qty: 1 TABLET | Refills: 0 | Status: SHIPPED | OUTPATIENT
Start: 2025-07-15 | End: 2025-07-15

## 2025-07-19 DIAGNOSIS — R11.0 NAUSEA: ICD-10-CM

## 2025-07-21 RX ORDER — EPINEPHRINE 0.3 MG/.3ML
0.3 INJECTION SUBCUTANEOUS ONCE
Qty: 1 EACH | Refills: 0 | Status: SHIPPED | OUTPATIENT
Start: 2025-07-21 | End: 2025-07-21

## 2025-07-21 RX ORDER — ONDANSETRON 4 MG/1
4 TABLET, ORALLY DISINTEGRATING ORAL EVERY 8 HOURS PRN
Qty: 30 TABLET | Refills: 2 | Status: SHIPPED | OUTPATIENT
Start: 2025-07-21

## 2025-07-30 DIAGNOSIS — F41.0 PANIC ATTACK AS REACTION TO STRESS: ICD-10-CM

## 2025-07-30 DIAGNOSIS — R11.0 NAUSEA: ICD-10-CM

## 2025-07-30 DIAGNOSIS — F43.0 PANIC ATTACK AS REACTION TO STRESS: ICD-10-CM

## 2025-07-30 RX ORDER — ALPRAZOLAM 0.5 MG
0.5 TABLET ORAL NIGHTLY PRN
Qty: 30 TABLET | Refills: 5 | OUTPATIENT
Start: 2025-07-30

## 2025-07-31 RX ORDER — ONDANSETRON 4 MG/1
4 TABLET, ORALLY DISINTEGRATING ORAL EVERY 8 HOURS PRN
Qty: 30 TABLET | Refills: 2 | Status: SHIPPED | OUTPATIENT
Start: 2025-07-31

## 2025-08-07 ENCOUNTER — APPOINTMENT (OUTPATIENT)
Dept: CT IMAGING | Facility: HOSPITAL | Age: 45
End: 2025-08-07
Payer: OTHER GOVERNMENT

## 2025-08-07 ENCOUNTER — HOSPITAL ENCOUNTER (EMERGENCY)
Facility: HOSPITAL | Age: 45
Discharge: HOME | End: 2025-08-07
Attending: STUDENT IN AN ORGANIZED HEALTH CARE EDUCATION/TRAINING PROGRAM | Admitting: STUDENT IN AN ORGANIZED HEALTH CARE EDUCATION/TRAINING PROGRAM
Payer: OTHER GOVERNMENT

## 2025-08-07 VITALS
OXYGEN SATURATION: 100 % | SYSTOLIC BLOOD PRESSURE: 156 MMHG | HEIGHT: 71 IN | BODY MASS INDEX: 25.2 KG/M2 | TEMPERATURE: 97.9 F | HEART RATE: 83 BPM | RESPIRATION RATE: 18 BRPM | DIASTOLIC BLOOD PRESSURE: 104 MMHG | WEIGHT: 180 LBS

## 2025-08-07 DIAGNOSIS — R11.2 NAUSEA AND VOMITING, UNSPECIFIED VOMITING TYPE: Primary | ICD-10-CM

## 2025-08-07 DIAGNOSIS — E86.0 DEHYDRATION: ICD-10-CM

## 2025-08-07 DIAGNOSIS — K76.0 FATTY LIVER: ICD-10-CM

## 2025-08-07 LAB
ALBUMIN SERPL-MCNC: 4.9 G/DL (ref 3.5–5.2)
ALBUMIN/GLOB SERPL: 1.6 G/DL
ALP SERPL-CCNC: 105 U/L (ref 39–117)
ALT SERPL W P-5'-P-CCNC: 41 U/L (ref 1–41)
AMPHET+METHAMPHET UR QL: POSITIVE
AMPHETAMINES UR QL: NEGATIVE
ANION GAP SERPL CALCULATED.3IONS-SCNC: 17.6 MMOL/L (ref 5–15)
AST SERPL-CCNC: 40 U/L (ref 1–40)
BARBITURATES UR QL SCN: NEGATIVE
BASOPHILS # BLD AUTO: 0.04 10*3/MM3 (ref 0–0.2)
BASOPHILS NFR BLD AUTO: 0.5 % (ref 0–1.5)
BENZODIAZ UR QL SCN: POSITIVE
BILIRUB SERPL-MCNC: 0.7 MG/DL (ref 0–1.2)
BILIRUB UR QL STRIP: NEGATIVE
BUN SERPL-MCNC: 10.9 MG/DL (ref 6–20)
BUN/CREAT SERPL: 9.6 (ref 7–25)
BUPRENORPHINE SERPL-MCNC: NEGATIVE NG/ML
CALCIUM SPEC-SCNC: 9.9 MG/DL (ref 8.6–10.5)
CANNABINOIDS SERPL QL: NEGATIVE
CHLORIDE SERPL-SCNC: 99 MMOL/L (ref 98–107)
CLARITY UR: CLEAR
CO2 SERPL-SCNC: 22.4 MMOL/L (ref 22–29)
COCAINE UR QL: NEGATIVE
COLOR UR: YELLOW
CREAT SERPL-MCNC: 1.14 MG/DL (ref 0.76–1.27)
DEPRECATED RDW RBC AUTO: 39.3 FL (ref 37–54)
EGFRCR SERPLBLD CKD-EPI 2021: 81.8 ML/MIN/1.73
EOSINOPHIL # BLD AUTO: 0.01 10*3/MM3 (ref 0–0.4)
EOSINOPHIL NFR BLD AUTO: 0.1 % (ref 0.3–6.2)
ERYTHROCYTE [DISTWIDTH] IN BLOOD BY AUTOMATED COUNT: 12.8 % (ref 12.3–15.4)
GLOBULIN UR ELPH-MCNC: 3.1 GM/DL
GLUCOSE SERPL-MCNC: 123 MG/DL (ref 65–99)
GLUCOSE UR STRIP-MCNC: NEGATIVE MG/DL
HCT VFR BLD AUTO: 41.9 % (ref 37.5–51)
HGB BLD-MCNC: 15 G/DL (ref 13–17.7)
HGB UR QL STRIP.AUTO: NEGATIVE
HOLD SPECIMEN: NORMAL
HOLD SPECIMEN: NORMAL
IMM GRANULOCYTES # BLD AUTO: 0.05 10*3/MM3 (ref 0–0.05)
IMM GRANULOCYTES NFR BLD AUTO: 0.6 % (ref 0–0.5)
KETONES UR QL STRIP: ABNORMAL
LEUKOCYTE ESTERASE UR QL STRIP.AUTO: NEGATIVE
LIPASE SERPL-CCNC: 30 U/L (ref 13–60)
LYMPHOCYTES # BLD AUTO: 0.81 10*3/MM3 (ref 0.7–3.1)
LYMPHOCYTES NFR BLD AUTO: 9.7 % (ref 19.6–45.3)
MCH RBC QN AUTO: 30.5 PG (ref 26.6–33)
MCHC RBC AUTO-ENTMCNC: 35.8 G/DL (ref 31.5–35.7)
MCV RBC AUTO: 85.3 FL (ref 79–97)
METHADONE UR QL SCN: NEGATIVE
MONOCYTES # BLD AUTO: 0.26 10*3/MM3 (ref 0.1–0.9)
MONOCYTES NFR BLD AUTO: 3.1 % (ref 5–12)
NEUTROPHILS NFR BLD AUTO: 7.14 10*3/MM3 (ref 1.7–7)
NEUTROPHILS NFR BLD AUTO: 86 % (ref 42.7–76)
NITRITE UR QL STRIP: NEGATIVE
NRBC BLD AUTO-RTO: 0 /100 WBC (ref 0–0.2)
OPIATES UR QL: NEGATIVE
OXYCODONE UR QL SCN: NEGATIVE
PCP UR QL SCN: NEGATIVE
PH UR STRIP.AUTO: 8.5 [PH] (ref 4.5–8)
PLATELET # BLD AUTO: 196 10*3/MM3 (ref 140–450)
PMV BLD AUTO: 9.1 FL (ref 6–12)
POTASSIUM SERPL-SCNC: 4.5 MMOL/L (ref 3.5–5.2)
PROT SERPL-MCNC: 8 G/DL (ref 6–8.5)
PROT UR QL STRIP: ABNORMAL
QT INTERVAL: 376 MS
QTC INTERVAL: 478 MS
RBC # BLD AUTO: 4.91 10*6/MM3 (ref 4.14–5.8)
SODIUM SERPL-SCNC: 139 MMOL/L (ref 136–145)
SP GR UR STRIP: 1.02 (ref 1–1.03)
TRICYCLICS UR QL SCN: NEGATIVE
TROPONIN T SERPL HS-MCNC: <6 NG/L
UROBILINOGEN UR QL STRIP: ABNORMAL
WBC NRBC COR # BLD AUTO: 8.31 10*3/MM3 (ref 3.4–10.8)
WHOLE BLOOD HOLD COAG: NORMAL
WHOLE BLOOD HOLD SPECIMEN: NORMAL

## 2025-08-07 PROCEDURE — 93010 ELECTROCARDIOGRAM REPORT: CPT | Performed by: INTERNAL MEDICINE

## 2025-08-07 PROCEDURE — 84484 ASSAY OF TROPONIN QUANT: CPT | Performed by: STUDENT IN AN ORGANIZED HEALTH CARE EDUCATION/TRAINING PROGRAM

## 2025-08-07 PROCEDURE — 74177 CT ABD & PELVIS W/CONTRAST: CPT

## 2025-08-07 PROCEDURE — 99285 EMERGENCY DEPT VISIT HI MDM: CPT | Performed by: STUDENT IN AN ORGANIZED HEALTH CARE EDUCATION/TRAINING PROGRAM

## 2025-08-07 PROCEDURE — 25010000002 FAMOTIDINE 10 MG/ML SOLUTION: Performed by: STUDENT IN AN ORGANIZED HEALTH CARE EDUCATION/TRAINING PROGRAM

## 2025-08-07 PROCEDURE — 93005 ELECTROCARDIOGRAM TRACING: CPT | Performed by: STUDENT IN AN ORGANIZED HEALTH CARE EDUCATION/TRAINING PROGRAM

## 2025-08-07 PROCEDURE — 25010000002 ONDANSETRON PER 1 MG: Performed by: STUDENT IN AN ORGANIZED HEALTH CARE EDUCATION/TRAINING PROGRAM

## 2025-08-07 PROCEDURE — 83690 ASSAY OF LIPASE: CPT | Performed by: STUDENT IN AN ORGANIZED HEALTH CARE EDUCATION/TRAINING PROGRAM

## 2025-08-07 PROCEDURE — 25810000003 SODIUM CHLORIDE 0.9 % SOLUTION: Performed by: STUDENT IN AN ORGANIZED HEALTH CARE EDUCATION/TRAINING PROGRAM

## 2025-08-07 PROCEDURE — 81003 URINALYSIS AUTO W/O SCOPE: CPT | Performed by: STUDENT IN AN ORGANIZED HEALTH CARE EDUCATION/TRAINING PROGRAM

## 2025-08-07 PROCEDURE — 96375 TX/PRO/DX INJ NEW DRUG ADDON: CPT

## 2025-08-07 PROCEDURE — 80053 COMPREHEN METABOLIC PANEL: CPT | Performed by: STUDENT IN AN ORGANIZED HEALTH CARE EDUCATION/TRAINING PROGRAM

## 2025-08-07 PROCEDURE — 80306 DRUG TEST PRSMV INSTRMNT: CPT | Performed by: STUDENT IN AN ORGANIZED HEALTH CARE EDUCATION/TRAINING PROGRAM

## 2025-08-07 PROCEDURE — 96365 THER/PROPH/DIAG IV INF INIT: CPT

## 2025-08-07 PROCEDURE — 85025 COMPLETE CBC W/AUTO DIFF WBC: CPT | Performed by: STUDENT IN AN ORGANIZED HEALTH CARE EDUCATION/TRAINING PROGRAM

## 2025-08-07 PROCEDURE — 25510000001 IOPAMIDOL PER 1 ML: Performed by: STUDENT IN AN ORGANIZED HEALTH CARE EDUCATION/TRAINING PROGRAM

## 2025-08-07 PROCEDURE — 96376 TX/PRO/DX INJ SAME DRUG ADON: CPT

## 2025-08-07 PROCEDURE — 25010000002 DROPERIDOL PER 5 MG: Performed by: STUDENT IN AN ORGANIZED HEALTH CARE EDUCATION/TRAINING PROGRAM

## 2025-08-07 PROCEDURE — 25010000002 MAGNESIUM SULFATE 2 GM/50ML SOLUTION: Performed by: STUDENT IN AN ORGANIZED HEALTH CARE EDUCATION/TRAINING PROGRAM

## 2025-08-07 RX ORDER — DROPERIDOL 2.5 MG/ML
1.25 INJECTION, SOLUTION INTRAMUSCULAR; INTRAVENOUS ONCE
Status: COMPLETED | OUTPATIENT
Start: 2025-08-07 | End: 2025-08-07

## 2025-08-07 RX ORDER — FAMOTIDINE 10 MG/ML
20 INJECTION, SOLUTION INTRAVENOUS ONCE
Status: COMPLETED | OUTPATIENT
Start: 2025-08-07 | End: 2025-08-07

## 2025-08-07 RX ORDER — IOPAMIDOL 755 MG/ML
100 INJECTION, SOLUTION INTRAVASCULAR
Status: COMPLETED | OUTPATIENT
Start: 2025-08-07 | End: 2025-08-07

## 2025-08-07 RX ORDER — MAGNESIUM SULFATE HEPTAHYDRATE 40 MG/ML
2 INJECTION, SOLUTION INTRAVENOUS ONCE
Status: COMPLETED | OUTPATIENT
Start: 2025-08-07 | End: 2025-08-07

## 2025-08-07 RX ORDER — ONDANSETRON 4 MG/1
4 TABLET, ORALLY DISINTEGRATING ORAL EVERY 8 HOURS PRN
Qty: 12 TABLET | Refills: 0 | Status: SHIPPED | OUTPATIENT
Start: 2025-08-07 | End: 2025-08-14 | Stop reason: SDUPTHER

## 2025-08-07 RX ORDER — SODIUM CHLORIDE 0.9 % (FLUSH) 0.9 %
10 SYRINGE (ML) INJECTION AS NEEDED
Status: DISCONTINUED | OUTPATIENT
Start: 2025-08-07 | End: 2025-08-07 | Stop reason: HOSPADM

## 2025-08-07 RX ORDER — ONDANSETRON 2 MG/ML
4 INJECTION INTRAMUSCULAR; INTRAVENOUS ONCE
Status: COMPLETED | OUTPATIENT
Start: 2025-08-07 | End: 2025-08-07

## 2025-08-07 RX ADMIN — SODIUM CHLORIDE 1000 ML: 9 INJECTION, SOLUTION INTRAVENOUS at 03:47

## 2025-08-07 RX ADMIN — IOPAMIDOL 100 ML: 755 INJECTION, SOLUTION INTRAVENOUS at 05:49

## 2025-08-07 RX ADMIN — ONDANSETRON 4 MG: 2 INJECTION, SOLUTION INTRAMUSCULAR; INTRAVENOUS at 03:47

## 2025-08-07 RX ADMIN — ONDANSETRON 4 MG: 2 INJECTION, SOLUTION INTRAMUSCULAR; INTRAVENOUS at 04:31

## 2025-08-07 RX ADMIN — SODIUM CHLORIDE 1000 ML: 9 INJECTION, SOLUTION INTRAVENOUS at 04:38

## 2025-08-07 RX ADMIN — FAMOTIDINE 20 MG: 10 INJECTION INTRAVENOUS at 03:47

## 2025-08-07 RX ADMIN — DROPERIDOL 1.25 MG: 2.5 INJECTION, SOLUTION INTRAMUSCULAR; INTRAVENOUS at 06:02

## 2025-08-07 RX ADMIN — MAGNESIUM SULFATE HEPTAHYDRATE 2 G: 40 INJECTION, SOLUTION INTRAVENOUS at 06:03

## 2025-08-14 ENCOUNTER — OFFICE VISIT (OUTPATIENT)
Dept: FAMILY MEDICINE CLINIC | Facility: CLINIC | Age: 45
End: 2025-08-14
Payer: OTHER GOVERNMENT

## 2025-08-14 ENCOUNTER — HOSPITAL ENCOUNTER (OUTPATIENT)
Dept: GENERAL RADIOLOGY | Facility: HOSPITAL | Age: 45
Discharge: HOME | End: 2025-08-14
Payer: OTHER GOVERNMENT

## 2025-08-14 VITALS
TEMPERATURE: 98.5 F | HEART RATE: 96 BPM | BODY MASS INDEX: 25.34 KG/M2 | OXYGEN SATURATION: 99 % | SYSTOLIC BLOOD PRESSURE: 90 MMHG | WEIGHT: 181 LBS | HEIGHT: 71 IN | DIASTOLIC BLOOD PRESSURE: 60 MMHG

## 2025-08-14 DIAGNOSIS — G89.29 CHRONIC MIDLINE LOW BACK PAIN WITHOUT SCIATICA: ICD-10-CM

## 2025-08-14 DIAGNOSIS — M54.50 CHRONIC MIDLINE LOW BACK PAIN WITHOUT SCIATICA: ICD-10-CM

## 2025-08-14 DIAGNOSIS — M79.671 FOOT PAIN, BILATERAL: ICD-10-CM

## 2025-08-14 DIAGNOSIS — M79.672 FOOT PAIN, BILATERAL: ICD-10-CM

## 2025-08-14 DIAGNOSIS — M79.671 FOOT PAIN, BILATERAL: Primary | ICD-10-CM

## 2025-08-14 DIAGNOSIS — N52.03 COMBINED ARTERIAL INSUFFICIENCY AND CORPORO-VENOUS OCCLUSIVE ERECTILE DYSFUNCTION: ICD-10-CM

## 2025-08-14 DIAGNOSIS — M79.672 FOOT PAIN, BILATERAL: Primary | ICD-10-CM

## 2025-08-14 PROBLEM — R79.89 ELEVATED LIVER FUNCTION TESTS: Status: RESOLVED | Noted: 2024-02-14 | Resolved: 2025-08-14

## 2025-08-14 PROCEDURE — 73630 X-RAY EXAM OF FOOT: CPT

## 2025-08-14 PROCEDURE — 99214 OFFICE O/P EST MOD 30 MIN: CPT | Performed by: FAMILY MEDICINE

## 2025-08-14 PROCEDURE — 72110 X-RAY EXAM L-2 SPINE 4/>VWS: CPT

## 2025-08-14 RX ORDER — ARIPIPRAZOLE 2 MG/1
2 TABLET ORAL DAILY
COMMUNITY
Start: 2025-07-23

## 2025-08-14 RX ORDER — SILDENAFIL CITRATE 20 MG/1
20 TABLET ORAL DAILY PRN
Qty: 30 TABLET | Refills: 5 | Status: SHIPPED | OUTPATIENT
Start: 2025-08-14

## 2025-08-15 DIAGNOSIS — M54.50 ACUTE BILATERAL LOW BACK PAIN WITHOUT SCIATICA: ICD-10-CM

## 2025-08-15 DIAGNOSIS — G43.009 MIGRAINE WITHOUT AURA AND WITHOUT STATUS MIGRAINOSUS, NOT INTRACTABLE: ICD-10-CM

## 2025-08-16 ENCOUNTER — PATIENT MESSAGE (OUTPATIENT)
Dept: FAMILY MEDICINE CLINIC | Facility: CLINIC | Age: 45
End: 2025-08-16
Payer: OTHER GOVERNMENT

## 2025-08-16 DIAGNOSIS — G56.03 CARPAL TUNNEL SYNDROME ON BOTH SIDES: Primary | ICD-10-CM

## 2025-08-17 RX ORDER — SUMATRIPTAN SUCCINATE 100 MG/1
TABLET ORAL
Qty: 9 TABLET | Refills: 11 | Status: SHIPPED | OUTPATIENT
Start: 2025-08-17

## 2025-08-21 DIAGNOSIS — F98.8 ATTENTION DEFICIT DISORDER (ADD) WITHOUT HYPERACTIVITY: ICD-10-CM

## 2025-08-21 RX ORDER — DEXTROAMPHETAMINE SACCHARATE, AMPHETAMINE ASPARTATE MONOHYDRATE, DEXTROAMPHETAMINE SULFATE AND AMPHETAMINE SULFATE 2.5; 2.5; 2.5; 2.5 MG/1; MG/1; MG/1; MG/1
10 CAPSULE, EXTENDED RELEASE ORAL EVERY MORNING
Qty: 30 CAPSULE | Refills: 0 | Status: SHIPPED | OUTPATIENT
Start: 2025-08-21

## 2025-08-25 ENCOUNTER — OFFICE VISIT (OUTPATIENT)
Dept: SLEEP MEDICINE | Facility: HOSPITAL | Age: 45
End: 2025-08-25
Payer: OTHER GOVERNMENT

## 2025-08-25 VITALS
BODY MASS INDEX: 25.9 KG/M2 | HEIGHT: 71 IN | HEART RATE: 86 BPM | WEIGHT: 185 LBS | OXYGEN SATURATION: 98 % | DIASTOLIC BLOOD PRESSURE: 71 MMHG | SYSTOLIC BLOOD PRESSURE: 106 MMHG

## 2025-08-25 DIAGNOSIS — F43.10 PTSD (POST-TRAUMATIC STRESS DISORDER): ICD-10-CM

## 2025-08-25 DIAGNOSIS — Z78.9 DIFFICULTY WITH CPAP USE: ICD-10-CM

## 2025-08-25 DIAGNOSIS — G47.33 OBSTRUCTIVE SLEEP APNEA, ADULT: Primary | ICD-10-CM

## 2025-08-25 DIAGNOSIS — F51.5 NIGHTMARES: ICD-10-CM

## 2025-08-25 PROCEDURE — G0463 HOSPITAL OUTPT CLINIC VISIT: HCPCS
